# Patient Record
Sex: FEMALE | Race: WHITE | Employment: OTHER | URBAN - METROPOLITAN AREA
[De-identification: names, ages, dates, MRNs, and addresses within clinical notes are randomized per-mention and may not be internally consistent; named-entity substitution may affect disease eponyms.]

---

## 2023-08-06 ENCOUNTER — APPOINTMENT (OUTPATIENT)
Dept: CT IMAGING | Age: 78
End: 2023-08-06
Payer: MEDICARE

## 2023-08-06 ENCOUNTER — HOSPITAL ENCOUNTER (INPATIENT)
Age: 78
LOS: 5 days | Discharge: NURSING, REHAB OR LONG-TERM CARE | End: 2023-08-11
Attending: EMERGENCY MEDICINE | Admitting: FAMILY MEDICINE
Payer: MEDICARE

## 2023-08-06 ENCOUNTER — APPOINTMENT (OUTPATIENT)
Dept: GENERAL RADIOLOGY | Age: 78
End: 2023-08-06
Payer: MEDICARE

## 2023-08-06 DIAGNOSIS — E11.65 HYPERGLYCEMIA DUE TO DIABETES MELLITUS (HCC): Primary | ICD-10-CM

## 2023-08-06 DIAGNOSIS — N18.4 STAGE 4 CHRONIC KIDNEY DISEASE (HCC): ICD-10-CM

## 2023-08-06 DIAGNOSIS — E87.5 HYPERKALEMIA: ICD-10-CM

## 2023-08-06 PROBLEM — N17.0 ACUTE RENAL FAILURE WITH TUBULAR NECROSIS (HCC): Status: ACTIVE | Noted: 2023-08-06

## 2023-08-06 PROBLEM — N17.9 AKI (ACUTE KIDNEY INJURY) (HCC): Status: ACTIVE | Noted: 2023-08-06

## 2023-08-06 LAB
ALBUMIN SERPL-MCNC: 3.8 G/DL (ref 3.5–5.2)
ALBUMIN/GLOB SERPL: 1.3 {RATIO} (ref 1–2.5)
ALP SERPL-CCNC: 124 U/L (ref 35–104)
ALT SERPL-CCNC: 13 U/L (ref 5–33)
ANION GAP SERPL CALCULATED.3IONS-SCNC: 12 MMOL/L (ref 9–17)
ANION GAP SERPL CALCULATED.3IONS-SCNC: 15 MMOL/L (ref 9–17)
AST SERPL-CCNC: 18 U/L
B-OH-BUTYR SERPL-MCNC: 0.18 MMOL/L (ref 0.02–0.27)
BACTERIA URNS QL MICRO: ABNORMAL
BASOPHILS # BLD: 0.1 K/UL (ref 0–0.2)
BASOPHILS NFR BLD: 1 % (ref 0–2)
BILIRUB SERPL-MCNC: 0.5 MG/DL (ref 0.3–1.2)
BILIRUB UR QL STRIP: NEGATIVE
BUN SERPL-MCNC: 50 MG/DL (ref 8–23)
CALCIUM SERPL-MCNC: 9 MG/DL (ref 8.6–10.4)
CASTS #/AREA URNS LPF: ABNORMAL /LPF
CASTS #/AREA URNS LPF: ABNORMAL /LPF
CHLORIDE SERPL-SCNC: 100 MMOL/L (ref 98–107)
CHLORIDE SERPL-SCNC: 91 MMOL/L (ref 98–107)
CLARITY UR: CLEAR
CO2 SERPL-SCNC: 17 MMOL/L (ref 20–31)
CO2 SERPL-SCNC: 17 MMOL/L (ref 20–31)
COLOR UR: YELLOW
CREAT SERPL-MCNC: 2.6 MG/DL (ref 0.5–0.9)
EOSINOPHIL # BLD: 0.3 K/UL (ref 0–0.4)
EOSINOPHILS RELATIVE PERCENT: 3 % (ref 1–4)
EPI CELLS #/AREA URNS HPF: ABNORMAL /HPF (ref 0–5)
ERYTHROCYTE [DISTWIDTH] IN BLOOD BY AUTOMATED COUNT: 13.2 % (ref 12.5–15.4)
GFR SERPL CREATININE-BSD FRML MDRD: 18 ML/MIN/1.73M2
GLUCOSE SERPL-MCNC: 461 MG/DL (ref 70–99)
GLUCOSE UR STRIP-MCNC: ABNORMAL MG/DL
HCT VFR BLD AUTO: 37.7 % (ref 36–46)
HGB BLD-MCNC: 12.9 G/DL (ref 12–16)
HGB UR QL STRIP.AUTO: NEGATIVE
KETONES UR STRIP-MCNC: NEGATIVE MG/DL
LEUKOCYTE ESTERASE UR QL STRIP: NEGATIVE
LYMPHOCYTES NFR BLD: 3.5 K/UL (ref 1–4.8)
LYMPHOCYTES RELATIVE PERCENT: 33 % (ref 24–44)
MCH RBC QN AUTO: 32.1 PG (ref 26–34)
MCHC RBC AUTO-ENTMCNC: 34.1 G/DL (ref 31–37)
MCV RBC AUTO: 94.2 FL (ref 80–100)
METER GLUCOSE: 123 MG/DL (ref 65–105)
METER GLUCOSE: 248 MG/DL (ref 65–105)
MONOCYTES NFR BLD: 0.6 K/UL (ref 0.1–1.2)
MONOCYTES NFR BLD: 5 % (ref 2–11)
MUCOUS THREADS URNS QL MICRO: ABNORMAL
NEUTROPHILS NFR BLD: 58 % (ref 36–66)
NEUTS SEG NFR BLD: 6.1 K/UL (ref 1.8–7.7)
NITRITE UR QL STRIP: NEGATIVE
PH UR STRIP: 6 [PH] (ref 5–8)
PLATELET # BLD AUTO: 393 K/UL (ref 140–450)
PMV BLD AUTO: 8.6 FL (ref 6–12)
POTASSIUM SERPL-SCNC: 5.3 MMOL/L (ref 3.7–5.3)
POTASSIUM SERPL-SCNC: 6.3 MMOL/L (ref 3.7–5.3)
PROT SERPL-MCNC: 6.8 G/DL (ref 6.4–8.3)
PROT UR STRIP-MCNC: ABNORMAL MG/DL
RBC # BLD AUTO: 4 M/UL (ref 4–5.2)
RBC #/AREA URNS HPF: ABNORMAL /HPF (ref 0–2)
SODIUM SERPL-SCNC: 123 MMOL/L (ref 135–144)
SODIUM SERPL-SCNC: 129 MMOL/L (ref 135–144)
SODIUM UR-SCNC: 39 MMOL/L
SP GR UR STRIP: 1.01 (ref 1–1.03)
TROPONIN I SERPL HS-MCNC: 22 NG/L (ref 0–14)
TSH SERPL DL<=0.05 MIU/L-ACNC: 1.92 UIU/ML (ref 0.3–5)
UROBILINOGEN UR STRIP-ACNC: NORMAL EU/DL (ref 0–1)
WBC #/AREA URNS HPF: ABNORMAL /HPF (ref 0–5)
WBC OTHER # BLD: 10.5 K/UL (ref 3.5–11)

## 2023-08-06 PROCEDURE — 2500000003 HC RX 250 WO HCPCS: Performed by: INTERNAL MEDICINE

## 2023-08-06 PROCEDURE — 99222 1ST HOSP IP/OBS MODERATE 55: CPT | Performed by: FAMILY MEDICINE

## 2023-08-06 PROCEDURE — 82570 ASSAY OF URINE CREATININE: CPT

## 2023-08-06 PROCEDURE — 2580000003 HC RX 258: Performed by: INTERNAL MEDICINE

## 2023-08-06 PROCEDURE — 2580000003 HC RX 258: Performed by: FAMILY MEDICINE

## 2023-08-06 PROCEDURE — 99223 1ST HOSP IP/OBS HIGH 75: CPT | Performed by: INTERNAL MEDICINE

## 2023-08-06 PROCEDURE — 84443 ASSAY THYROID STIM HORMONE: CPT

## 2023-08-06 PROCEDURE — 99285 EMERGENCY DEPT VISIT HI MDM: CPT

## 2023-08-06 PROCEDURE — 6370000000 HC RX 637 (ALT 250 FOR IP): Performed by: NURSE PRACTITIONER

## 2023-08-06 PROCEDURE — 6360000002 HC RX W HCPCS: Performed by: NURSE PRACTITIONER

## 2023-08-06 PROCEDURE — 2580000003 HC RX 258: Performed by: NURSE PRACTITIONER

## 2023-08-06 PROCEDURE — 87086 URINE CULTURE/COLONY COUNT: CPT

## 2023-08-06 PROCEDURE — 85025 COMPLETE CBC W/AUTO DIFF WBC: CPT

## 2023-08-06 PROCEDURE — 36415 COLL VENOUS BLD VENIPUNCTURE: CPT

## 2023-08-06 PROCEDURE — 6370000000 HC RX 637 (ALT 250 FOR IP): Performed by: FAMILY MEDICINE

## 2023-08-06 PROCEDURE — 80053 COMPREHEN METABOLIC PANEL: CPT

## 2023-08-06 PROCEDURE — 84484 ASSAY OF TROPONIN QUANT: CPT

## 2023-08-06 PROCEDURE — 70450 CT HEAD/BRAIN W/O DYE: CPT

## 2023-08-06 PROCEDURE — 84156 ASSAY OF PROTEIN URINE: CPT

## 2023-08-06 PROCEDURE — 96375 TX/PRO/DX INJ NEW DRUG ADDON: CPT

## 2023-08-06 PROCEDURE — 96374 THER/PROPH/DIAG INJ IV PUSH: CPT

## 2023-08-06 PROCEDURE — 6360000002 HC RX W HCPCS: Performed by: FAMILY MEDICINE

## 2023-08-06 PROCEDURE — 82010 KETONE BODYS QUAN: CPT

## 2023-08-06 PROCEDURE — 81001 URINALYSIS AUTO W/SCOPE: CPT

## 2023-08-06 PROCEDURE — 71045 X-RAY EXAM CHEST 1 VIEW: CPT

## 2023-08-06 PROCEDURE — 84300 ASSAY OF URINE SODIUM: CPT

## 2023-08-06 PROCEDURE — 80051 ELECTROLYTE PANEL: CPT

## 2023-08-06 PROCEDURE — 93005 ELECTROCARDIOGRAM TRACING: CPT | Performed by: NURSE PRACTITIONER

## 2023-08-06 PROCEDURE — 2060000000 HC ICU INTERMEDIATE R&B

## 2023-08-06 PROCEDURE — 82947 ASSAY GLUCOSE BLOOD QUANT: CPT

## 2023-08-06 RX ORDER — SODIUM CHLORIDE 9 MG/ML
INJECTION, SOLUTION INTRAVENOUS PRN
Status: DISCONTINUED | OUTPATIENT
Start: 2023-08-06 | End: 2023-08-11 | Stop reason: HOSPADM

## 2023-08-06 RX ORDER — BISACODYL 10 MG
10 SUPPOSITORY, RECTAL RECTAL DAILY PRN
Status: DISCONTINUED | OUTPATIENT
Start: 2023-08-06 | End: 2023-08-11 | Stop reason: HOSPADM

## 2023-08-06 RX ORDER — INSULIN LISPRO 100 [IU]/ML
0-4 INJECTION, SOLUTION INTRAVENOUS; SUBCUTANEOUS NIGHTLY
Status: DISCONTINUED | OUTPATIENT
Start: 2023-08-06 | End: 2023-08-11 | Stop reason: HOSPADM

## 2023-08-06 RX ORDER — GLUCAGON 1 MG/ML
1 KIT INJECTION PRN
Status: DISCONTINUED | OUTPATIENT
Start: 2023-08-06 | End: 2023-08-11 | Stop reason: HOSPADM

## 2023-08-06 RX ORDER — 0.9 % SODIUM CHLORIDE 0.9 %
1000 INTRAVENOUS SOLUTION INTRAVENOUS ONCE
Status: COMPLETED | OUTPATIENT
Start: 2023-08-06 | End: 2023-08-06

## 2023-08-06 RX ORDER — AMLODIPINE BESYLATE 5 MG/1
5 TABLET ORAL DAILY
Status: DISCONTINUED | OUTPATIENT
Start: 2023-08-07 | End: 2023-08-11 | Stop reason: HOSPADM

## 2023-08-06 RX ORDER — ONDANSETRON 4 MG/1
4 TABLET, ORALLY DISINTEGRATING ORAL EVERY 8 HOURS PRN
Status: DISCONTINUED | OUTPATIENT
Start: 2023-08-06 | End: 2023-08-11 | Stop reason: HOSPADM

## 2023-08-06 RX ORDER — ONDANSETRON 2 MG/ML
4 INJECTION INTRAMUSCULAR; INTRAVENOUS EVERY 6 HOURS PRN
Status: DISCONTINUED | OUTPATIENT
Start: 2023-08-06 | End: 2023-08-11 | Stop reason: HOSPADM

## 2023-08-06 RX ORDER — HEPARIN SODIUM 5000 [USP'U]/ML
5000 INJECTION, SOLUTION INTRAVENOUS; SUBCUTANEOUS EVERY 8 HOURS SCHEDULED
Status: DISCONTINUED | OUTPATIENT
Start: 2023-08-06 | End: 2023-08-11 | Stop reason: HOSPADM

## 2023-08-06 RX ORDER — CEPHALEXIN 500 MG/1
1 CAPSULE ORAL
Status: ON HOLD | COMMUNITY
Start: 2023-08-01 | End: 2023-08-09 | Stop reason: HOSPADM

## 2023-08-06 RX ORDER — ACETAMINOPHEN 325 MG/1
650 TABLET ORAL EVERY 6 HOURS PRN
Status: DISCONTINUED | OUTPATIENT
Start: 2023-08-06 | End: 2023-08-11 | Stop reason: HOSPADM

## 2023-08-06 RX ORDER — SODIUM CHLORIDE 0.9 % (FLUSH) 0.9 %
5-40 SYRINGE (ML) INJECTION EVERY 12 HOURS SCHEDULED
Status: DISCONTINUED | OUTPATIENT
Start: 2023-08-06 | End: 2023-08-11 | Stop reason: HOSPADM

## 2023-08-06 RX ORDER — SODIUM CHLORIDE 9 MG/ML
INJECTION, SOLUTION INTRAVENOUS CONTINUOUS
Status: DISCONTINUED | OUTPATIENT
Start: 2023-08-06 | End: 2023-08-06

## 2023-08-06 RX ORDER — AMLODIPINE BESYLATE 5 MG/1
TABLET ORAL
Status: ON HOLD | COMMUNITY
Start: 2023-05-16 | End: 2023-08-08 | Stop reason: HOSPADM

## 2023-08-06 RX ORDER — CEPHALEXIN 250 MG/1
500 CAPSULE ORAL EVERY 12 HOURS SCHEDULED
Status: COMPLETED | OUTPATIENT
Start: 2023-08-06 | End: 2023-08-11

## 2023-08-06 RX ORDER — DEXTROSE MONOHYDRATE 100 MG/ML
INJECTION, SOLUTION INTRAVENOUS CONTINUOUS PRN
Status: DISCONTINUED | OUTPATIENT
Start: 2023-08-06 | End: 2023-08-11 | Stop reason: HOSPADM

## 2023-08-06 RX ORDER — INSULIN LISPRO 100 [IU]/ML
0-4 INJECTION, SOLUTION INTRAVENOUS; SUBCUTANEOUS
Status: DISCONTINUED | OUTPATIENT
Start: 2023-08-06 | End: 2023-08-11 | Stop reason: HOSPADM

## 2023-08-06 RX ORDER — INSULIN GLARGINE 100 [IU]/ML
35 INJECTION, SOLUTION SUBCUTANEOUS DAILY
Status: DISCONTINUED | OUTPATIENT
Start: 2023-08-06 | End: 2023-08-11 | Stop reason: HOSPADM

## 2023-08-06 RX ORDER — CALCIUM GLUCONATE 94 MG/ML
1000 INJECTION, SOLUTION INTRAVENOUS ONCE
Status: COMPLETED | OUTPATIENT
Start: 2023-08-06 | End: 2023-08-06

## 2023-08-06 RX ORDER — ACETAMINOPHEN 650 MG/1
650 SUPPOSITORY RECTAL EVERY 6 HOURS PRN
Status: DISCONTINUED | OUTPATIENT
Start: 2023-08-06 | End: 2023-08-11 | Stop reason: HOSPADM

## 2023-08-06 RX ORDER — SODIUM CHLORIDE 0.9 % (FLUSH) 0.9 %
5-40 SYRINGE (ML) INJECTION PRN
Status: DISCONTINUED | OUTPATIENT
Start: 2023-08-06 | End: 2023-08-11 | Stop reason: HOSPADM

## 2023-08-06 RX ADMIN — CALCIUM GLUCONATE 1000 MG: 98 INJECTION, SOLUTION INTRAVENOUS at 15:00

## 2023-08-06 RX ADMIN — HEPARIN SODIUM 5000 UNITS: 5000 INJECTION INTRAVENOUS; SUBCUTANEOUS at 23:20

## 2023-08-06 RX ADMIN — CEPHALEXIN 500 MG: 250 CAPSULE ORAL at 23:11

## 2023-08-06 RX ADMIN — SODIUM CHLORIDE 1000 ML: 9 INJECTION, SOLUTION INTRAVENOUS at 15:02

## 2023-08-06 RX ADMIN — ACETAMINOPHEN 650 MG: 325 TABLET ORAL at 23:11

## 2023-08-06 RX ADMIN — SODIUM CHLORIDE: 9 INJECTION, SOLUTION INTRAVENOUS at 19:05

## 2023-08-06 RX ADMIN — INSULIN HUMAN 10 UNITS: 100 INJECTION, SOLUTION PARENTERAL at 14:58

## 2023-08-06 RX ADMIN — SODIUM CHLORIDE, PRESERVATIVE FREE 10 ML: 5 INJECTION INTRAVENOUS at 22:21

## 2023-08-06 RX ADMIN — SODIUM BICARBONATE: 84 INJECTION, SOLUTION INTRAVENOUS at 22:19

## 2023-08-06 ASSESSMENT — PAIN SCALES - GENERAL
PAINLEVEL_OUTOF10: 0
PAINLEVEL_OUTOF10: 9

## 2023-08-06 ASSESSMENT — PAIN - FUNCTIONAL ASSESSMENT: PAIN_FUNCTIONAL_ASSESSMENT: 0-10

## 2023-08-06 ASSESSMENT — PAIN DESCRIPTION - DESCRIPTORS: DESCRIPTORS: ACHING

## 2023-08-06 ASSESSMENT — PAIN DESCRIPTION - LOCATION: LOCATION: ABDOMEN

## 2023-08-06 ASSESSMENT — ENCOUNTER SYMPTOMS
DIARRHEA: 0
VOMITING: 0
SHORTNESS OF BREATH: 0
NAUSEA: 0
ABDOMINAL PAIN: 0

## 2023-08-06 ASSESSMENT — PAIN DESCRIPTION - ORIENTATION: ORIENTATION: LOWER

## 2023-08-06 NOTE — H&P
Monocytes % 5 2 - 11 %    Eosinophils % 3 1 - 4 %    Basophils % 1 0 - 2 %    Neutrophils Absolute 6.10 1.8 - 7.7 k/uL    Lymphocytes Absolute 3.50 1.0 - 4.8 k/uL    Monocytes Absolute 0.60 0.1 - 1.2 k/uL    Eosinophils Absolute 0.30 0.0 - 0.4 k/uL    Basophils Absolute 0.10 0.0 - 0.2 k/uL   Comprehensive Metabolic Panel    Collection Time: 08/06/23  1:45 PM   Result Value Ref Range    Glucose 461 (HH) 70 - 99 mg/dL    BUN 50 (H) 8 - 23 mg/dL    Creatinine 2.6 (H) 0.5 - 0.9 mg/dL    Est, Glom Filt Rate 18 (L) >60 mL/min/1.73m2    Calcium 9.0 8.6 - 10.4 mg/dL    Sodium 123 (L) 135 - 144 mmol/L    Potassium 6.3 (HH) 3.7 - 5.3 mmol/L    Chloride 91 (L) 98 - 107 mmol/L    CO2 17 (L) 20 - 31 mmol/L    Anion Gap 15 9 - 17 mmol/L    Alkaline Phosphatase 124 (H) 35 - 104 U/L    ALT 13 5 - 33 U/L    AST 18 <32 U/L    Total Bilirubin 0.5 0.3 - 1.2 mg/dL    Total Protein 6.8 6.4 - 8.3 g/dL    Albumin 3.8 3.5 - 5.2 g/dL    Albumin/Globulin Ratio 1.3 1.0 - 2.5   Troponin    Collection Time: 08/06/23  1:45 PM   Result Value Ref Range    Troponin, High Sensitivity 22 (H) 0 - 14 ng/L   TSH with Reflex    Collection Time: 08/06/23  1:45 PM   Result Value Ref Range    TSH 1.92 0.30 - 5.00 uIU/mL   Beta-Hydroxybutyrate    Collection Time: 08/06/23  1:45 PM   Result Value Ref Range    Beta-Hydroxybutyrate 0.18 0.02 - 0.27 mmol/L   EKG 12 Lead    Collection Time: 08/06/23  2:31 PM   Result Value Ref Range    Ventricular Rate 87 BPM    Atrial Rate 87 BPM    P-R Interval 154 ms    QRS Duration 82 ms    Q-T Interval 364 ms    QTc Calculation (Bazett) 438 ms    P Axis 22 degrees    R Axis 27 degrees    T Axis 63 degrees       Imaging/Diagnostics:  CT HEAD WO CONTRAST    Result Date: 8/6/2023  Mild central and cortical cerebral atrophy. Encephalomalacia in the left occipital parietal lobe. Bilateral lacunar infarcts in the basal ganglia most likely chronic.  Mild to moderate chronic deep white matter ischemic changes No acute intracranial abnormalities are noted. Focal hyperostosis along the outer table of the right parietal bone. Please correlate clinically     XR CHEST PORTABLE    Result Date: 8/6/2023  No acute cardiopulmonary disease       Assessment :      Hospital Problems             Last Modified POA    * (Principal) SHANITA (acute kidney injury) ([address]) 8/6/2023 Yes       Plan:     Acute kidney injury-nephrology consulted, continue IV fluids, will hold lisinopril and metformin at this point. In reviewing chart records patient was resisting to get labs done more recently for whatever reason, there seems to be a disconnect in their understanding of her underlying health And medical problems; creatinine ranged 1.4-1.6 2019 through 2021. Microalbumin to urine ratio in 2021 was 2300 of note. 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 Hyperkalemia-secondary to acute kidney injury, hold metformin and lisinopril, IV fluids, nephrology consulted, hyperkalemia treated in ER. 4. Hypertension-continue amlodipine, hold lisinopril due to SHANITA, titrate Norvasc or add other blood pressure agents if needed  5. History of CVA with left hemiparesis  6. Recent UTI-check UA C and S, continue cephalexin for now  7. Recent fall and head trauma-CT brain this admission no acute finding    Patient is admitted as inpatient status because of co-morbidities listed above, severity of signs and symptoms as outlined, requirement for current medical therapies and most importantly because of direct risk to patient if care not provided in a hospital setting. Expected length of stay > 48 hours.  Patient is admitted in the Medical ICU    Medical Decision Making: High Sana Norwood MD  8/6/2023  4:47 PM

## 2023-08-06 NOTE — ED PROVIDER NOTES
[facility]  Emergency Department Encounter  Mid Level Provider     Pt Name: Jong Hooper  MRN: [id number]  [address] [facility] 1945  Date of evaluation: 8/6/23  PCP:  Laurence Pinon MD    CHIEF COMPLAINT       Chief Complaint   Patient presents with    Altered Mental Status     A&OX2, being treated for uti, hx of strokes in past, son told ems that she is not acting herself,  per ems       HISTORY OF PRESENT ILLNESS  (Location/Symptom, Timing/Onset,Context/Setting, Quality, Duration, Modifying Factors, Severity.)      Jong Hooper is a 78 y.o. female who presents with no concerns for me. Overall she is a poor informant. Will wait for her son to arrive to receive history. Patient denies chest pain or shortness of breath. She denies fever or chills. She denies nausea vomiting diarrhea. Denies urinary symptoms. She states she has no complaints at all. She reports she lives with her son who cares for her. Patient reports she can walk but prefers not to. PAST MEDICAL /SURGICAL / SOCIAL / FAMILY HISTORY      has a past medical history of Anxiety, Cervicalgia, Diabetes mellitus ([address]), Dorsalgia, Hemiplegia ([address]), Hypertension, Osteoporosis, and Stroke (cerebrum) ([address]). has a past surgical history that includes back surgery and knee surgery.     Social History     Socioeconomic History    Marital status:      Spouse name: Not on file    Number of children: Not on file    Years of education: Not on file    Highest education level: Not on file   Occupational History    Not on file   Tobacco Use    Smoking status: Never    Smokeless tobacco: Never   Substance and Sexual Activity    Alcohol use: Never    Drug use: Not on file    Sexual activity: Not on file   Other Topics Concern    Not on file   Social History Narrative    Not on file     Social Determinants of Health     Financial Resource Strain: Not on file   Food Insecurity: Not on file   Transportation Needs:

## 2023-08-07 ENCOUNTER — APPOINTMENT (OUTPATIENT)
Dept: ULTRASOUND IMAGING | Age: 78
End: 2023-08-07
Payer: MEDICARE

## 2023-08-07 PROBLEM — I10 ESSENTIAL HYPERTENSION: Status: ACTIVE | Noted: 2023-08-07

## 2023-08-07 PROBLEM — N18.9 ACUTE KIDNEY INJURY SUPERIMPOSED ON CKD (HCC): Status: ACTIVE | Noted: 2023-08-06

## 2023-08-07 PROBLEM — N17.9 ACUTE KIDNEY INJURY SUPERIMPOSED ON CKD (HCC): Status: ACTIVE | Noted: 2023-08-06

## 2023-08-07 LAB
ALBUMIN SERPL-MCNC: 3.2 G/DL (ref 3.5–5.2)
ALBUMIN/GLOB SERPL: 1.3 {RATIO} (ref 1–2.5)
ALP SERPL-CCNC: 97 U/L (ref 35–104)
ALT SERPL-CCNC: 11 U/L (ref 5–33)
ANION GAP SERPL CALCULATED.3IONS-SCNC: 6 MMOL/L (ref 9–17)
ANION GAP SERPL CALCULATED.3IONS-SCNC: 7 MMOL/L (ref 9–17)
ANION GAP SERPL CALCULATED.3IONS-SCNC: 8 MMOL/L (ref 9–17)
ANION GAP SERPL CALCULATED.3IONS-SCNC: 9 MMOL/L (ref 9–17)
AST SERPL-CCNC: 14 U/L
BASOPHILS # BLD: 0.1 K/UL (ref 0–0.2)
BASOPHILS NFR BLD: 1 % (ref 0–2)
BILIRUB SERPL-MCNC: 0.5 MG/DL (ref 0.3–1.2)
BUN SERPL-MCNC: 42 MG/DL (ref 8–23)
CALCIUM SERPL-MCNC: 8.8 MG/DL (ref 8.6–10.4)
CHLORIDE SERPL-SCNC: 102 MMOL/L (ref 98–107)
CHLORIDE SERPL-SCNC: 96 MMOL/L (ref 98–107)
CHLORIDE SERPL-SCNC: 97 MMOL/L (ref 98–107)
CHLORIDE SERPL-SCNC: 99 MMOL/L (ref 98–107)
CO2 SERPL-SCNC: 24 MMOL/L (ref 20–31)
CO2 SERPL-SCNC: 28 MMOL/L (ref 20–31)
CO2 SERPL-SCNC: 28 MMOL/L (ref 20–31)
CO2 SERPL-SCNC: 29 MMOL/L (ref 20–31)
CREAT SERPL-MCNC: 2.1 MG/DL (ref 0.5–0.9)
CREAT UR-MCNC: 66 MG/DL (ref 28–217)
EKG ATRIAL RATE: 87 BPM
EKG P AXIS: 22 DEGREES
EKG P-R INTERVAL: 154 MS
EKG Q-T INTERVAL: 364 MS
EKG QRS DURATION: 82 MS
EKG QTC CALCULATION (BAZETT): 438 MS
EKG R AXIS: 27 DEGREES
EKG T AXIS: 63 DEGREES
EKG VENTRICULAR RATE: 87 BPM
EOSINOPHIL # BLD: 0.6 K/UL (ref 0–0.4)
EOSINOPHILS RELATIVE PERCENT: 5 % (ref 1–4)
ERYTHROCYTE [DISTWIDTH] IN BLOOD BY AUTOMATED COUNT: 13.2 % (ref 12.5–15.4)
GFR SERPL CREATININE-BSD FRML MDRD: 23 ML/MIN/1.73M2
GLUCOSE SERPL-MCNC: 188 MG/DL (ref 70–99)
HCT VFR BLD AUTO: 34.9 % (ref 36–46)
HGB BLD-MCNC: 11.9 G/DL (ref 12–16)
LYMPHOCYTES NFR BLD: 4.2 K/UL (ref 1–4.8)
LYMPHOCYTES RELATIVE PERCENT: 36 % (ref 24–44)
MCH RBC QN AUTO: 31.6 PG (ref 26–34)
MCHC RBC AUTO-ENTMCNC: 34 G/DL (ref 31–37)
MCV RBC AUTO: 92.9 FL (ref 80–100)
METER GLUCOSE: 122 MG/DL (ref 65–105)
METER GLUCOSE: 174 MG/DL (ref 65–105)
METER GLUCOSE: 205 MG/DL (ref 65–105)
METER GLUCOSE: 257 MG/DL (ref 65–105)
MONOCYTES NFR BLD: 0.7 K/UL (ref 0.1–1.2)
MONOCYTES NFR BLD: 6 % (ref 2–11)
NEUTROPHILS NFR BLD: 52 % (ref 36–66)
NEUTS SEG NFR BLD: 6.1 K/UL (ref 1.8–7.7)
PLATELET # BLD AUTO: 381 K/UL (ref 140–450)
PMV BLD AUTO: 8.3 FL (ref 6–12)
POTASSIUM SERPL-SCNC: 4.6 MMOL/L (ref 3.7–5.3)
POTASSIUM SERPL-SCNC: 4.7 MMOL/L (ref 3.7–5.3)
PROT SERPL-MCNC: 5.7 G/DL (ref 6.4–8.3)
RBC # BLD AUTO: 3.75 M/UL (ref 4–5.2)
REASON FOR REJECTION: NORMAL
SODIUM SERPL-SCNC: 132 MMOL/L (ref 135–144)
SODIUM SERPL-SCNC: 132 MMOL/L (ref 135–144)
SODIUM SERPL-SCNC: 133 MMOL/L (ref 135–144)
SODIUM SERPL-SCNC: 136 MMOL/L (ref 135–144)
SPECIMEN SOURCE: NORMAL
TOTAL PROTEIN, URINE: 82 MG/DL
WBC OTHER # BLD: 11.6 K/UL (ref 3.5–11)
ZZ NTE CLEAN UP: ORDERED TEST: NORMAL

## 2023-08-07 PROCEDURE — 6370000000 HC RX 637 (ALT 250 FOR IP): Performed by: FAMILY MEDICINE

## 2023-08-07 PROCEDURE — 36415 COLL VENOUS BLD VENIPUNCTURE: CPT

## 2023-08-07 PROCEDURE — 2060000000 HC ICU INTERMEDIATE R&B

## 2023-08-07 PROCEDURE — 99221 1ST HOSP IP/OBS SF/LOW 40: CPT | Performed by: FAMILY MEDICINE

## 2023-08-07 PROCEDURE — 97535 SELF CARE MNGMENT TRAINING: CPT

## 2023-08-07 PROCEDURE — 76770 US EXAM ABDO BACK WALL COMP: CPT

## 2023-08-07 PROCEDURE — 6360000002 HC RX W HCPCS: Performed by: FAMILY MEDICINE

## 2023-08-07 PROCEDURE — 97162 PT EVAL MOD COMPLEX 30 MIN: CPT

## 2023-08-07 PROCEDURE — 97116 GAIT TRAINING THERAPY: CPT

## 2023-08-07 PROCEDURE — 6370000000 HC RX 637 (ALT 250 FOR IP): Performed by: INTERNAL MEDICINE

## 2023-08-07 PROCEDURE — 82947 ASSAY GLUCOSE BLOOD QUANT: CPT

## 2023-08-07 PROCEDURE — 80051 ELECTROLYTE PANEL: CPT

## 2023-08-07 PROCEDURE — 2580000003 HC RX 258: Performed by: FAMILY MEDICINE

## 2023-08-07 PROCEDURE — 97166 OT EVAL MOD COMPLEX 45 MIN: CPT

## 2023-08-07 PROCEDURE — 97530 THERAPEUTIC ACTIVITIES: CPT

## 2023-08-07 PROCEDURE — 85025 COMPLETE CBC W/AUTO DIFF WBC: CPT

## 2023-08-07 PROCEDURE — 99232 SBSQ HOSP IP/OBS MODERATE 35: CPT | Performed by: INTERNAL MEDICINE

## 2023-08-07 PROCEDURE — 2580000003 HC RX 258: Performed by: INTERNAL MEDICINE

## 2023-08-07 PROCEDURE — 80053 COMPREHEN METABOLIC PANEL: CPT

## 2023-08-07 RX ORDER — TRAMADOL HYDROCHLORIDE 50 MG/1
50 TABLET ORAL EVERY 6 HOURS PRN
Status: DISCONTINUED | OUTPATIENT
Start: 2023-08-07 | End: 2023-08-11 | Stop reason: HOSPADM

## 2023-08-07 RX ORDER — SODIUM CHLORIDE 9 MG/ML
INJECTION, SOLUTION INTRAVENOUS CONTINUOUS
Status: DISCONTINUED | OUTPATIENT
Start: 2023-08-07 | End: 2023-08-09

## 2023-08-07 RX ADMIN — HEPARIN SODIUM 5000 UNITS: 5000 INJECTION INTRAVENOUS; SUBCUTANEOUS at 22:03

## 2023-08-07 RX ADMIN — SODIUM CHLORIDE: 9 INJECTION, SOLUTION INTRAVENOUS at 11:48

## 2023-08-07 RX ADMIN — HEPARIN SODIUM 5000 UNITS: 5000 INJECTION INTRAVENOUS; SUBCUTANEOUS at 14:27

## 2023-08-07 RX ADMIN — SODIUM ZIRCONIUM CYCLOSILICATE 5 G: 5 POWDER, FOR SUSPENSION ORAL at 10:59

## 2023-08-07 RX ADMIN — SODIUM ZIRCONIUM CYCLOSILICATE 5 G: 5 POWDER, FOR SUSPENSION ORAL at 14:27

## 2023-08-07 RX ADMIN — TRAMADOL HYDROCHLORIDE 50 MG: 50 TABLET, COATED ORAL at 18:52

## 2023-08-07 RX ADMIN — CEPHALEXIN 500 MG: 250 CAPSULE ORAL at 22:02

## 2023-08-07 RX ADMIN — INSULIN LISPRO 1 UNITS: 100 INJECTION, SOLUTION INTRAVENOUS; SUBCUTANEOUS at 12:50

## 2023-08-07 RX ADMIN — HEPARIN SODIUM 5000 UNITS: 5000 INJECTION INTRAVENOUS; SUBCUTANEOUS at 06:36

## 2023-08-07 RX ADMIN — SODIUM CHLORIDE, PRESERVATIVE FREE 10 ML: 5 INJECTION INTRAVENOUS at 09:09

## 2023-08-07 RX ADMIN — ACETAMINOPHEN 650 MG: 325 TABLET ORAL at 17:40

## 2023-08-07 RX ADMIN — INSULIN GLARGINE 35 UNITS: 100 INJECTION, SOLUTION SUBCUTANEOUS at 10:59

## 2023-08-07 RX ADMIN — SODIUM ZIRCONIUM CYCLOSILICATE 5 G: 5 POWDER, FOR SUSPENSION ORAL at 01:16

## 2023-08-07 RX ADMIN — AMLODIPINE BESYLATE 5 MG: 5 TABLET ORAL at 08:41

## 2023-08-07 RX ADMIN — INSULIN LISPRO 2 UNITS: 100 INJECTION, SOLUTION INTRAVENOUS; SUBCUTANEOUS at 17:40

## 2023-08-07 RX ADMIN — CEPHALEXIN 500 MG: 250 CAPSULE ORAL at 08:41

## 2023-08-07 ASSESSMENT — PAIN DESCRIPTION - LOCATION
LOCATION: BACK;LEG
LOCATION: BACK;LEG

## 2023-08-07 ASSESSMENT — PAIN SCALES - GENERAL
PAINLEVEL_OUTOF10: 0
PAINLEVEL_OUTOF10: 3
PAINLEVEL_OUTOF10: 0
PAINLEVEL_OUTOF10: 4

## 2023-08-07 ASSESSMENT — PAIN SCALES - WONG BAKER: WONGBAKER_NUMERICALRESPONSE: 0

## 2023-08-07 NOTE — PROGRESS NOTES
[address] [place] [address],[address] - Location: [place]    Progress Note    8/7/2023    9:18 AM    Name:   Rosevelt Phoenix  MRN:     [id number]     Acct:      [de-identified]   Room:   [address] [place] [address] Day:  1  Admit Date:  8/6/2023  1:42 PM    PCP:   Yolanda Carey MD  Code Status:  DNR-CCA    Subjective:     Patient complains of being tired this morning, she is knows she is at [facility] and she wants to go home. Denies cough or shortness of breath chest pain abdominal pain. PT was assessing patient when I arrived in the room and said she was a moderate assist to get her from the bed to the chair. Patient denies using walker at home, her son helps her move around and usually walks daily with him according what he told me. She does not know the date or the year. Last night was notified by nurse that upon arrival to the floor bladder scan indicated 1054 in bladder so Delaney was placed, clear yellow urine drained. Patient denies any urinary difficulties at home. Medications: Allergies:     Allergies   Allergen Reactions    Aricept [Donepezil Hcl] Diarrhea     Diarrhea    Namenda [Memantine Hcl] Other (See Comments)     Encephalopathy       Current Meds:   Scheduled Meds:    sodium chloride flush  5-40 mL IntraVENous 2 times per day    heparin (porcine)  5,000 Units SubCUTAneous 3 times per day    insulin lispro  0-4 Units SubCUTAneous TID WC    insulin lispro  0-4 Units SubCUTAneous Nightly    amLODIPine  5 mg Oral Daily    insulin glargine  35 Units SubCUTAneous Daily    cephALEXin  500 mg Oral 2 times per day    sodium zirconium cyclosilicate  5 g Oral TID     Continuous Infusions:    sodium chloride      dextrose      IV infusion builder 100 mL/hr at 08/06/23 2219     PRN Meds: sodium chloride flush, sodium chloride, ondansetron **OR** ondansetron, acetaminophen **OR** acetaminophen, bisacodyl, glucose, dextrose bolus **OR** dextrose bolus, glucagon (rDNA),

## 2023-08-07 NOTE — PROGRESS NOTES
Physical Therapy  Facility/Department: [facility] MED SURG ICU  Physical Therapy Initial Assessment    Name: Chad Mohs  : 1945  MRN: [id number]  Date of Service: 2023    Chief Complaint   Patient presents with    Altered Mental Status     A&OX2, being treated for uti, hx of strokes in past, son told ems that she is not acting herself,  per ems       Discharge Recommendations:  Patient would benefit from continued therapy after discharge   Further therapy recommended at discharge. The patient should be able to tolerate at least 3 hours of therapy per day over 5 days or 15 hours over 7 days. This patient may benefit from a Physical Medicine and Rehab consult. PT Equipment Recommendations  Other: Continue to assess- owns a quad cane. Will continue to assess once able to further attempt mobility      Patient Diagnosis(es): The primary encounter diagnosis was Hyperglycemia due to diabetes mellitus ([address]). Diagnoses of Hyperkalemia and Stage 4 chronic kidney disease ([address]) were also pertinent to this visit. Past Medical History:  has a past medical history of Anxiety, Cervicalgia, Diabetes mellitus ([address]), Dorsalgia, Hemiplegia ([address]), Hypertension, Osteoporosis, and Stroke (cerebrum) ([address]). Past Surgical History:  has a past surgical history that includes back surgery and knee surgery. Assessment   Body Structures, Functions, Activity Limitations Requiring Skilled Therapeutic Intervention: Decreased functional mobility ; Decreased strength;Decreased safe awareness;Decreased cognition;Decreased endurance;Decreased balance;Decreased posture  Assessment: Pt with impaired mobility requiring mod A for bed mobility and transfers today. Pt with decreased bilateral LE strength significantly limiting mobility overall. Pt would currently be unsafe to return to her prior living arrangement secondary to decreased balance and impaired LE strength making her at increased risk of falls.  Pt would benefit

## 2023-08-07 NOTE — PROGRESS NOTES
[address] [facility]  PROGRESS NOTE    Room # 740/938-13   Name: Surendra Sam            Episcopal:       Reason for visit: Routine    I visited the patient. Admit Date & Time: 8/6/2023  1:42 PM    Assessment:  Surendra Sam is a 78 y.o. female in the hospital because \"SHANITA\". Upon entering the room patient was sitting up in chair. Patient did not express any needs or concerns to this writer. Patient appeared to be calm and passive. Intervention:  I introduced myself and my title as  I offered space for patient  to express feelings, needs, and concerns and provided a ministry presence. Patient engaged in very brief conversation with patient, actively listening to patient. Outcome:  Patient expressed gratitude for this visit     Plan:  Chaplains will remain available to offer spiritual and emotional support as needed.     Electronically signed by Praveen Yun on 8/7/2023 at 12:13 PM.  [address] [facility] [address] -        08/07/23 1211   Encounter Summary   Service Provided For: Patient   Referral/Consult From: [address] [place] [address]   Last Encounter  08/07/23   Complexity of Encounter Low   Begin Time 1205   End Time  1210   Total Time Calculated 5 min   Encounter    Type Initial Screen/Assessment   Assessment/Intervention/Outcome   Assessment Passive;Calm   Intervention Sustaining Presence/Ministry of presence   Outcome Engaged in conversation;Expressed Gratitude

## 2023-08-07 NOTE — PROGRESS NOTES
Occupational Therapy  Facility/Department: [address] [facility] ICU  Occupational Therapy Initial Assessment    Name: Alma Iqbal  : 1945  MRN: [id number]  Date of Service: 2023    Chief Complaint   Patient presents with    Altered Mental Status     A&OX2, being treated for uti, hx of strokes in past, son told ems that she is not acting herself,  per ems     Discharge Recommendations:   Further therapy recommended at discharge. The patient should be able to tolerate at least 3 hours of therapy per day over 5 days or 15 hours over 7 days. This patient may benefit from a Physical Medicine and Rehab consult. OT Equipment Recommendations  Equipment Needed:  (AE/DME recommendations TBD)     Patient Diagnosis(es): The primary encounter diagnosis was Hyperglycemia due to diabetes mellitus ([address]). Diagnoses of Hyperkalemia and Stage 4 chronic kidney disease ([address]) were also pertinent to this visit. Past Medical History:  has a past medical history of Anxiety, Cervicalgia, Diabetes mellitus ([address]), Dorsalgia, Hemiplegia ([address]), Hypertension, Osteoporosis, and Stroke (cerebrum) ([address]). Past Surgical History:  has a past surgical history that includes back surgery and knee surgery. Assessment   Performance deficits / Impairments: Decreased functional mobility ; Decreased safe awareness;Decreased balance;Decreased ADL status; Decreased cognition;Decreased coordination;Decreased endurance;Decreased ROM; Decreased strength    Assessment: Pt seen for OT eval s/p SHANITA. Pt presents as pleaantly confused. Pt however denying majority of activity following PT eval. Pt MOD A for bed mobility, sat EOB for grooming at CGA for ~8 mins; MOD A for sit<>stand transfer with HHA once standing for ~2 mins with posterior knee support. RUE AROM ~120 degrees abduction; LUE AROM ~70 degrees abduction, L elbow AROM WFL, contractures L digits with hx of stroke affecting L side.  Hx taken from previous notes which reference pt's To: Patient  Education Provided: Role of Therapy;Transfer Training;Equipment;Plan of Care  Education Method: Verbal  Barriers to Learning: Cognition  Education Outcome: Verbalized understanding;Continued education needed     Hand Dominance  Hand Dominance: Right     AM-PAC Score     AM-PAC Inpatient Daily Activity Raw Score: 14 (08/07/23 1639)  AM-PAC Inpatient ADL T-Scale Score : 33.39 (08/07/23 1639)  ADL Inpatient CMS 0-100% Score: 59.67 (08/07/23 1639)  ADL Inpatient CMS G-Code Modifier : CK (08/07/23 1639)    Goals  Short Term Goals  Time Frame for Short Term Goals: 14 visits  Short Term Goal 1: Pt to complete UB dressing at setup to support return to PLOF  Short Term Goal 2: Pt to complete toileting at SBA to support return to PLOF  Short Term Goal 3: Pt to complete standing ADL task for >8 mins with no LOB and no rest breaks to support return to PLOF  Short Term Goal 4: Pt to be IND with BUE HEP to support improved functional strength needed for ADLs and ADL transfers     Therapy Time   Individual Concurrent Group Co-treatment   Time In 1519         Time Out 1540         Minutes 21         Timed Code Treatment Minutes: [address] [facility] [address] [place]

## 2023-08-07 NOTE — PROGRESS NOTES
oz (54.7 kg)     PHYSICAL EXAM      GENERAL APPEARANCE:Awake, alert, in no acute distress  SKIN: warm and dry, no rash or erythema  EYES: conjunctivae normal and sclera anicteric  ENT: no thrush no pharyngeal congestion   NECK:   No JVD. No carotid bruits and no carotid lymphadenopathy . PULMONARY: lungs are clear to auscultation. No Wheezing, no ronchi . CADRDIOVASCULAR: S1 and S2 normal NO S3 and NO S4 . No rubs , no murmur. ABDOMEN: soft nontender, bowel sounds present, no organomegaly, no ascites.      EXTREMITIES: no cyanosis, clubbing or edema     CURRENT MEDICATIONS      sodium chloride flush 0.9 % injection 5-40 mL, 2 times per day  sodium chloride flush 0.9 % injection 5-40 mL, PRN  0.9 % sodium chloride infusion, PRN  ondansetron (ZOFRAN-ODT) disintegrating tablet 4 mg, Q8H PRN   Or  ondansetron (ZOFRAN) injection 4 mg, Q6H PRN  acetaminophen (TYLENOL) tablet 650 mg, Q6H PRN   Or  acetaminophen (TYLENOL) suppository 650 mg, Q6H PRN  bisacodyl (DULCOLAX) suppository 10 mg, Daily PRN  heparin (porcine) injection 5,000 Units, 3 times per day  insulin lispro (HUMALOG) injection vial 0-4 Units, TID WC  insulin lispro (HUMALOG) injection vial 0-4 Units, Nightly  glucose chewable tablet 16 g, PRN  dextrose bolus 10% 125 mL, PRN   Or  dextrose bolus 10% 250 mL, PRN  glucagon injection 1 mg, PRN  dextrose 10 % infusion, Continuous PRN  amLODIPine (NORVASC) tablet 5 mg, Daily  insulin glargine (LANTUS) injection vial 35 Units, Daily  cephALEXin (KEFLEX) capsule 500 mg, 2 times per day  sodium zirconium cyclosilicate (LOKELMA) oral suspension 5 g, TID  sodium bicarbonate 150 mEq in sterile water infusion, Continuous          LABS      CBC:   Recent Labs     08/06/23  1345 08/07/23  0206   WBC 10.5 11.6*   RBC 4.00 3.75*   HGB 12.9 11.9*   HCT 37.7 34.9*   MCV 94.2 92.9   MCH 32.1 31.6   MCHC 34.1 34.0   RDW 13.2 13.2    381   MPV 8.6 8.3      BMP:   Recent Labs     08/06/23  1345 08/06/23  2100 08/07/23  0206 08/07/23  0928   * 129* 132* 133*   K 6.3* 5.3 4.6 4.7   CL 91* 100 99 97*   CO2 17* 17* 24 29   BUN 50*  --  42*  --    CREATININE 2.6*  --  2.1*  --    GLUCOSE 461*  --  188*  --    CALCIUM 9.0  --  8.8  --         ALBUMIN:   Recent Labs     08/06/23  1345 08/07/23  0206   LABALBU 3.8 3.2*       SPEP:   Lab Results   Component Value Date/Time    PROT 5.7 08/07/2023 02:06 AM     URINALYSIS:  U/A:   Lab Results   Component Value Date/Time    NITRU NEGATIVE 08/06/2023 05:06 PM    COLORU Yellow 08/06/2023 05:06 PM    PHUR 6.0 08/06/2023 05:06 PM    WBCUA 2 TO 5 08/06/2023 05:06 PM    RBCUA 2 TO 5 08/06/2023 05:06 PM    MUCUS 1+ 08/06/2023 05:06 PM    BACTERIA FEW 08/06/2023 05:06 PM    SPECGRAV 1.015 08/06/2023 05:06 PM    LEUKOCYTESUR NEGATIVE 08/06/2023 05:06 PM    UROBILINOGEN Normal 08/06/2023 05:06 PM    BILIRUBINUR NEGATIVE 08/06/2023 05:06 PM    GLUCOSEU 3+ 08/06/2023 05:06 PM    KETUA NEGATIVE 08/06/2023 05:06 PM         RADIOLOGY      Chest x-ray: Clear      ASSESSMENT      1. Acute Kidney Injury: Likely prerenal azotemia/ischemic ATN from intravascular volume depletion from osmotic diuresis, ACE inhibitor use. Baseline creatinine 1.8 approximately 4 years ago creatinine on presentation was 2.6 , creatinine down to 2.1 this morning   2. 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 Hyperkalemia likely from extracellular shift from hyperglycemia, metabolic acidosis, acute kidney injury, potassium supplements and lisinopril use  4. Hyperglycemic nonketotic state with blood sugar of more than 460   5. Type 2 diabetes with poor control  6. Hypertension  7. Anion gap metabolic acidosis secondary to acute kidney injury      PLAN      1. May discontinue bicarbonate infusion   2. Do not restart metformin upon discharge  3. Follow up labs ordered.    4.  Lipid on normal saline at 50 mL an hour which most likely can be

## 2023-08-07 NOTE — CARE COORDINATION
Identified Issues/Barriers to RETURNING to current housing: None  Potential Assistance needed at discharge: Home Care            Potential DME:    Patient expects to discharge to: [address] for transportation at discharge: Family    Financial    Payor: [id number] / Plan: [address] [place] [address] / Product Type: *No Product type* /     Does insurance require precert for SNF: Yes    Potential assistance Purchasing Medications:    Meds-to-Beds request:        [id number] - [address], [address] [facility] [phone] - [address] [facility] [address]  Phone: [phone] Fax: [phone]      Notes:    Factors facilitating achievement of predicted outcomes: Family support    Barriers to discharge: Additional Case Management Notes: Met with patient and son to discuss dc options. Pt lives with son in one level home. Son provides 24 hour care. She has a quad cane. Son is requesting a kvng wheelchair. Pt may also benefit from walker. Discussed short term SNF for rehab. Son prefers to take patient home. Discussed home therapy and skilled nursing. Offered choice of providers. He has no preference. The Plan for Transition of Care is related to the following treatment goals of Hyperkalemia [E87.5]  SHANITA (acute kidney injury) ([address]) [N17.9]  Stage 4 chronic kidney disease ([address]) [N18.4]  Hyperglycemia due to diabetes mellitus ([address]) [J53.24]    IF APPLICABLE: The Patient and/or patient representative Governor Medellin and her family were provided with a choice of provider and agrees with the discharge plan. Freedom of choice list with basic dialogue that supports the patient's individualized plan of care/goals and shares the quality data associated with the providers was provided to: Patient Representative   Patient Representative Name: Luiz Dyer     The Patient and/or Patient Representative Agree with the Discharge Plan?  Yes    JUAN Goetz  Case Management Department  Ph:  Fax:

## 2023-08-08 PROBLEM — E87.5 HYPERKALEMIA: Status: RESOLVED | Noted: 2023-08-06 | Resolved: 2023-08-08

## 2023-08-08 PROBLEM — N17.9 AKI (ACUTE KIDNEY INJURY) (HCC): Status: RESOLVED | Noted: 2023-08-06 | Resolved: 2023-08-08

## 2023-08-08 LAB
ALBUMIN SERPL-MCNC: 3 G/DL (ref 3.5–5.2)
ALBUMIN/GLOB SERPL: 1.3 {RATIO} (ref 1–2.5)
ALP SERPL-CCNC: 96 U/L (ref 35–104)
ALT SERPL-CCNC: 12 U/L (ref 5–33)
ANION GAP SERPL CALCULATED.3IONS-SCNC: 6 MMOL/L (ref 9–17)
ANION GAP SERPL CALCULATED.3IONS-SCNC: 7 MMOL/L (ref 9–17)
ANION GAP SERPL CALCULATED.3IONS-SCNC: 9 MMOL/L (ref 9–17)
ANION GAP SERPL CALCULATED.3IONS-SCNC: 9 MMOL/L (ref 9–17)
AST SERPL-CCNC: 18 U/L
BASOPHILS # BLD: 0.12 K/UL (ref 0–0.2)
BASOPHILS NFR BLD: 1 % (ref 0–2)
BILIRUB SERPL-MCNC: 0.3 MG/DL (ref 0.3–1.2)
BUN SERPL-MCNC: 37 MG/DL (ref 8–23)
CALCIUM SERPL-MCNC: 8.4 MG/DL (ref 8.6–10.4)
CHLORIDE SERPL-SCNC: 102 MMOL/L (ref 98–107)
CHLORIDE SERPL-SCNC: 103 MMOL/L (ref 98–107)
CHLORIDE SERPL-SCNC: 105 MMOL/L (ref 98–107)
CHLORIDE SERPL-SCNC: 107 MMOL/L (ref 98–107)
CO2 SERPL-SCNC: 23 MMOL/L (ref 20–31)
CO2 SERPL-SCNC: 24 MMOL/L (ref 20–31)
CO2 SERPL-SCNC: 27 MMOL/L (ref 20–31)
CO2 SERPL-SCNC: 28 MMOL/L (ref 20–31)
CREAT SERPL-MCNC: 2.1 MG/DL (ref 0.5–0.9)
EOSINOPHIL # BLD: 0.74 K/UL (ref 0–0.4)
EOSINOPHILS RELATIVE PERCENT: 6 % (ref 1–4)
ERYTHROCYTE [DISTWIDTH] IN BLOOD BY AUTOMATED COUNT: 12.6 % (ref 12.5–15.4)
GFR SERPL CREATININE-BSD FRML MDRD: 23 ML/MIN/1.73M2
GLUCOSE SERPL-MCNC: 85 MG/DL (ref 70–99)
HCT VFR BLD AUTO: 33.7 % (ref 36–46)
HGB BLD-MCNC: 11 G/DL (ref 12–16)
LYMPHOCYTES NFR BLD: 5.04 K/UL (ref 1–4.8)
LYMPHOCYTES RELATIVE PERCENT: 41 % (ref 24–44)
MCH RBC QN AUTO: 30.9 PG (ref 26–34)
MCHC RBC AUTO-ENTMCNC: 32.6 G/DL (ref 31–37)
MCV RBC AUTO: 94.7 FL (ref 80–100)
METER GLUCOSE: 123 MG/DL (ref 65–105)
METER GLUCOSE: 145 MG/DL (ref 65–105)
METER GLUCOSE: 173 MG/DL (ref 65–105)
METER GLUCOSE: 249 MG/DL (ref 65–105)
METER GLUCOSE: 258 MG/DL (ref 65–105)
METER GLUCOSE: 67 MG/DL (ref 65–105)
MICROORGANISM SPEC CULT: NORMAL
MONOCYTES NFR BLD: 0.86 K/UL (ref 0.1–0.8)
MONOCYTES NFR BLD: 7 % (ref 1–7)
MORPHOLOGY: NORMAL
NEUTROPHILS NFR BLD: 45 % (ref 36–66)
NEUTS SEG NFR BLD: 5.54 K/UL (ref 1.8–7.7)
PLATELET # BLD AUTO: 396 K/UL (ref 140–450)
PMV BLD AUTO: 9.8 FL (ref 8–14)
POTASSIUM SERPL-SCNC: 4.1 MMOL/L (ref 3.7–5.3)
POTASSIUM SERPL-SCNC: 4.2 MMOL/L (ref 3.7–5.3)
POTASSIUM SERPL-SCNC: 4.3 MMOL/L (ref 3.7–5.3)
POTASSIUM SERPL-SCNC: 5.3 MMOL/L (ref 3.7–5.3)
PROT SERPL-MCNC: 5.4 G/DL (ref 6.4–8.3)
RBC # BLD AUTO: 3.56 M/UL (ref 4–5.2)
SODIUM SERPL-SCNC: 135 MMOL/L (ref 135–144)
SODIUM SERPL-SCNC: 135 MMOL/L (ref 135–144)
SODIUM SERPL-SCNC: 138 MMOL/L (ref 135–144)
SODIUM SERPL-SCNC: 142 MMOL/L (ref 135–144)
SPECIMEN DESCRIPTION: NORMAL
WBC OTHER # BLD: 12.3 K/UL (ref 3.5–11)

## 2023-08-08 PROCEDURE — 85025 COMPLETE CBC W/AUTO DIFF WBC: CPT

## 2023-08-08 PROCEDURE — 51702 INSERT TEMP BLADDER CATH: CPT

## 2023-08-08 PROCEDURE — 82947 ASSAY GLUCOSE BLOOD QUANT: CPT

## 2023-08-08 PROCEDURE — 97530 THERAPEUTIC ACTIVITIES: CPT

## 2023-08-08 PROCEDURE — 97110 THERAPEUTIC EXERCISES: CPT

## 2023-08-08 PROCEDURE — 36415 COLL VENOUS BLD VENIPUNCTURE: CPT

## 2023-08-08 PROCEDURE — 99232 SBSQ HOSP IP/OBS MODERATE 35: CPT | Performed by: INTERNAL MEDICINE

## 2023-08-08 PROCEDURE — 6360000002 HC RX W HCPCS: Performed by: FAMILY MEDICINE

## 2023-08-08 PROCEDURE — 2580000003 HC RX 258: Performed by: FAMILY MEDICINE

## 2023-08-08 PROCEDURE — 2580000003 HC RX 258: Performed by: INTERNAL MEDICINE

## 2023-08-08 PROCEDURE — 2060000000 HC ICU INTERMEDIATE R&B

## 2023-08-08 PROCEDURE — 80051 ELECTROLYTE PANEL: CPT

## 2023-08-08 PROCEDURE — 6370000000 HC RX 637 (ALT 250 FOR IP): Performed by: FAMILY MEDICINE

## 2023-08-08 PROCEDURE — 99232 SBSQ HOSP IP/OBS MODERATE 35: CPT | Performed by: FAMILY MEDICINE

## 2023-08-08 PROCEDURE — 80053 COMPREHEN METABOLIC PANEL: CPT

## 2023-08-08 RX ORDER — AMLODIPINE BESYLATE 5 MG/1
5 TABLET ORAL DAILY
Qty: 30 TABLET | Refills: 3 | Status: SHIPPED | OUTPATIENT
Start: 2023-08-09

## 2023-08-08 RX ADMIN — HEPARIN SODIUM 5000 UNITS: 5000 INJECTION INTRAVENOUS; SUBCUTANEOUS at 22:34

## 2023-08-08 RX ADMIN — CEPHALEXIN 500 MG: 250 CAPSULE ORAL at 22:35

## 2023-08-08 RX ADMIN — TRAMADOL HYDROCHLORIDE 50 MG: 50 TABLET, COATED ORAL at 22:42

## 2023-08-08 RX ADMIN — SODIUM CHLORIDE: 9 INJECTION, SOLUTION INTRAVENOUS at 22:40

## 2023-08-08 RX ADMIN — INSULIN GLARGINE 35 UNITS: 100 INJECTION, SOLUTION SUBCUTANEOUS at 09:33

## 2023-08-08 RX ADMIN — HEPARIN SODIUM 5000 UNITS: 5000 INJECTION INTRAVENOUS; SUBCUTANEOUS at 06:51

## 2023-08-08 RX ADMIN — INSULIN LISPRO 2 UNITS: 100 INJECTION, SOLUTION INTRAVENOUS; SUBCUTANEOUS at 12:10

## 2023-08-08 RX ADMIN — SODIUM CHLORIDE: 9 INJECTION, SOLUTION INTRAVENOUS at 03:54

## 2023-08-08 RX ADMIN — CEPHALEXIN 500 MG: 250 CAPSULE ORAL at 09:33

## 2023-08-08 RX ADMIN — AMLODIPINE BESYLATE 5 MG: 5 TABLET ORAL at 09:33

## 2023-08-08 RX ADMIN — TRAMADOL HYDROCHLORIDE 50 MG: 50 TABLET, COATED ORAL at 00:42

## 2023-08-08 RX ADMIN — SODIUM CHLORIDE, PRESERVATIVE FREE 10 ML: 5 INJECTION INTRAVENOUS at 09:33

## 2023-08-08 RX ADMIN — HEPARIN SODIUM 5000 UNITS: 5000 INJECTION INTRAVENOUS; SUBCUTANEOUS at 14:30

## 2023-08-08 RX ADMIN — SODIUM CHLORIDE, PRESERVATIVE FREE 10 ML: 5 INJECTION INTRAVENOUS at 22:35

## 2023-08-08 ASSESSMENT — PAIN SCALES - WONG BAKER: WONGBAKER_NUMERICALRESPONSE: 0

## 2023-08-08 ASSESSMENT — PAIN DESCRIPTION - DESCRIPTORS
DESCRIPTORS: ACHING
DESCRIPTORS: ACHING

## 2023-08-08 ASSESSMENT — PAIN SCALES - GENERAL
PAINLEVEL_OUTOF10: 3
PAINLEVEL_OUTOF10: 0
PAINLEVEL_OUTOF10: 5
PAINLEVEL_OUTOF10: 9

## 2023-08-08 ASSESSMENT — PAIN DESCRIPTION - ORIENTATION: ORIENTATION: OTHER (COMMENT)

## 2023-08-08 ASSESSMENT — PAIN DESCRIPTION - LOCATION: LOCATION: OTHER (COMMENT)

## 2023-08-08 NOTE — DISCHARGE INSTR - DIET

## 2023-08-08 NOTE — CARE COORDINATION
Transitional Planning    Spoke with son, legal guardian, at bedside. He states to writer that after thinking about SNF vs home more he prefers to take patient home. Son is understandable that home care will only be able to come about twice a week. Requesting referrals to home care agencies: [facility] [address], and [facility]. [address] with [employer] [facility].  They can accept, PS sent to Dr. Marty Mendenhall [facility] order

## 2023-08-08 NOTE — PROGRESS NOTES
Nephrology Progress Note      SUBJECTIVE       Pt was seen and examined. Patient is awake and alert. Her son who is taking care of her almost full-time now was in the room and was able to provide me very good history. Skilled nursing facility versus rehab has been suggested however son prefers to take her home. She is currently off lisinopril and metformin  Oral intake good. Urine output good. Delaney catheter still in place. Creatinine down to 2.1 which is close to her baseline. IV fluids switched to normal saline yesterday, bicarbonate was discontinued. She denies being nauseous or having any abdominal pain. No fever or chills reported. Work-up so far shows proteinuria of about 1.2 to 1.3 g, ultrasound shows right kidney to be 8.4 left to be 8.7 cm. Urine cultures negative, urinalysis shows 2-5 RBCs and WBCs  Labs today showed a sodium of 142 potassium 4.1 chloride 107 bicarb 28 BUN 37 creatinine 2.1 glucose 85 albumin 3.0 hemoglobin 11 white count 12.3 platelets 142     History reviewed  History of type 2 diabetes, hypertension, chronic kidney disease stage IIIb, baseline creatinine in 2019 was 1.8, no recent labs are available. she has seen Dr. Mago Childers in the office with irregular follow-up. Her home medicines include lisinopril Glucophage and potassium supplements. She presented to the hospital brought in by her son with complaints of confusion and lethargy. About a week ago or so she had symptoms of polyuria and had called her primary's office. She was given antibiotics with presumed diagnosis of UTI. She felt better however on the day of her admission she had confusion again and came to the ER brought by son. She was noted to have a creatinine of 2.6, potassium of 6.3 and a bicarb of 17 with an anion gap of 15 and glucose of 462  In hospital postvoid residue was checked was found to be 1000 mL, Delaney was placed about a liter of urine was drained right away.     OBJECTIVE      CURRENT identified and corrected by editing    Electronically signed by Corrie Cary MD on 8/8/2023 at 10:11 AM

## 2023-08-08 NOTE — PROGRESS NOTES
Contact-guard assistance;Minimum assistance;Assist X1;Adaptive equipment; Additional time (Pt ranging from CGA-MIN A for functional mobility with use of IV pole for stabilization via RUE. Initial mobility pt required MIN A to maintain standing balance; however once completing mobility pt CGA; pt required MOD vc's for safety awareness)  Interventions: Verbal cues;Manual cues; Safety awareness training  Assistive Device: Gait belt (IV pole for stabilization)     Transfers  Sit to stand: Minimal assistance; Moderate assistance (Pt ranging from MIN-MOD A for sit<>stand transfers with HHA. Initital sit>stand from recliner pt at MOD A with limited use of RUE to push; second sit>stand from EOB was MIN A with improved BLE and RUE use during transfer. Pt required cues throughout)  Stand to sit: Minimal assistance (Pt MIN A for stand>sit. Pt requires vc's for transfer technique and to reach behind with RUE to seated surface.  Pt occasionally prematurely sitting.)     Education Given To: Patient  Education Provided: Role of Therapy;Transfer Training;Equipment;Plan of Care  Education Method: Verbal  Barriers to Learning: Cognition  Education Outcome: Verbalized understanding;Continued education needed     AM-PAC Score     AM-[facility] Inpatient Daily Activity Raw Score: 14 (08/08/23 1341)  AM-PAC Inpatient ADL T-Scale Score : 33.39 (08/08/23 1341)  ADL Inpatient CMS 0-100% Score: 59.67 (08/08/23 1341)  ADL Inpatient CMS G-Code Modifier : CK (08/08/23 1341)    Goals  Short Term Goals  Time Frame for Short Term Goals: 14 visits  Short Term Goal 1: Pt to complete UB dressing at setup to support return to PLOF  Short Term Goal 2: Pt to complete toileting at SBA to support return to PLOF  Short Term Goal 3: Pt to complete standing ADL task for >8 mins with no LOB and no rest breaks to support return to PLOF  Short Term Goal 4: Pt to be IND with BUE HEP to support improved functional strength needed for ADLs and ADL transfers     Therapy Time   Individual Concurrent Group Co-treatment   Time In 1303         Time Out 1326         Minutes 23         Timed Code Treatment Minutes: [id number] [facility] Shahla Islase

## 2023-08-08 NOTE — PLAN OF CARE
Problem: Discharge Planning  Goal: Discharge to home or other facility with appropriate resources  Outcome: Progressing  Flowsheets (Taken 8/8/2023 0933)  Discharge to home or other facility with appropriate resources:   Identify barriers to discharge with patient and caregiver   Arrange for needed discharge resources and transportation as appropriate     Problem: Pain  Goal: Verbalizes/displays adequate comfort level or baseline comfort level  Outcome: Progressing     Problem: Safety - Adult  Goal: Free from fall injury  Outcome: Progressing     Problem: ABCDS Injury Assessment  Goal: Absence of physical injury  Outcome: Progressing     Problem: Chronic Conditions and Co-morbidities  Goal: Patient's chronic conditions and co-morbidity symptoms are monitored and maintained or improved  Outcome: Progressing  Flowsheets (Taken 8/8/2023 0933)  Care Plan - Patient's Chronic Conditions and Co-Morbidity Symptoms are Monitored and Maintained or Improved: Monitor and assess patient's chronic conditions and comorbid symptoms for stability, deterioration, or improvement

## 2023-08-08 NOTE — PROGRESS NOTES
Physical Therapy  Facility/Department: [facility] MED SURG ICU  Treatment Note     Name: Jong Hooper  : 1945  MRN: [id number]  Date of Service: 2023          Chief Complaint   Patient presents with    Altered Mental Status       A&OX2, being treated for uti, hx of strokes in past, son told ems that she is not acting herself,  per ems         Discharge Recommendations:  Patient would benefit from continued therapy after discharge   Further therapy recommended at discharge. The patient should be able to tolerate at least 3 hours of therapy per day over 5 days or 15 hours over 7 days. This patient may benefit from a Physical Medicine and Rehab consult. PT Equipment Recommendations  Other: Continue to assess- owns a quad cane. Will continue to assess once able to further attempt mobility      Patient Diagnosis(es): The primary encounter diagnosis was Hyperglycemia due to diabetes mellitus ([address]). Diagnoses of Hyperkalemia and Stage 4 chronic kidney disease ([address]) were also pertinent to this visit. Past Medical History:  has a past medical history of Anxiety, Cervicalgia, Diabetes mellitus ([address]), Dorsalgia, Hemiplegia ([address]), Hypertension, Osteoporosis, and Stroke (cerebrum) ([address]). Past Surgical History:  has a past surgical history that includes back surgery and knee surgery. Assessment   Body Structures, Functions, Activity Limitations Requiring Skilled Therapeutic Intervention: Decreased functional mobility ; Decreased strength;Decreased safe awareness;Decreased cognition;Decreased endurance;Decreased balance;Decreased posture  Assessment: Pt with impaired mobility requiring mod A for bed mobility and transfers today. Pt with decreased bilateral LE strength significantly limiting mobility overall. Pt would currently be unsafe to return to her prior living arrangement secondary to decreased balance and impaired LE strength making her at increased risk of falls.  Pt would benefit from high Decreased awareness of need for assistance;Decreased awareness of need for safety  Problem Solving: Assistance required to generate solutions;Assistance required to identify errors made;Assistance required to implement solutions  Insights: Decreased awareness of deficits  Initiation: Requires cues for all  Sequencing: Requires cues for all      Objective   Bed mobility  Supine to Sit: Moderate assistance (use of grab bar, verbal cues for sequencing)  Sit to Supine:  (retired up to chair)  Scooting: Maximal assistance   Bed Mobility Comments: Increased time and verbal cues; decreased awareness of errors. Pt able to sit EOB x 10 minutes with CGA today with one UE support (min to mod A without UE support). Decreased overall seated balance but does improve with increased seated time and cues for aligning self EOB for LE and UE input. Transfers  Sit to Stand: Moderate Assistance  Stand to Sit: Moderate Assistance  Comment: Performed transfer three times from bed height- cues for sequencing, poor safety awareness/awareness of errors and deficits. Ambulation  Surface: Level tile  Device: Hand-Held Assist  Assistance: Moderate assistance to Minimal assistance   Quality of Gait: unsteady with caio LE weakness- controlled buckling of LEs, decreased endurance, forward flexed  Gait Deviations: Slow Delia;Decreased step length;Decreased step height;Shuffles  Distance: 4ft to chair     Balance  Posture: Fair  Sitting - Static: Fair  Sitting - Dynamic: Fair;-  Standing - Static: Poor  Standing - Dynamic: Poor  Comments: standing balance assessed with HHA     Educated on importance of mobility, POC for therapy/importance of mobility with therapy, therapy concerns about mobility as unsafe to ambulate without high level of skilled assist at this time. Pt verbalized understanding. Exercises  seated BLE AROM x 10 reps: marches, ankle pumps, LAQs. Cueing needed for technique and to slow pace of exercises throughout.

## 2023-08-09 PROBLEM — I69.354 HEMIPARESIS AFFECTING LEFT SIDE AS LATE EFFECT OF CEREBROVASCULAR ACCIDENT (CVA) (HCC): Chronic | Status: ACTIVE | Noted: 2023-08-09

## 2023-08-09 PROBLEM — R33.9 URINARY RETENTION WITH INCOMPLETE BLADDER EMPTYING: Chronic | Status: ACTIVE | Noted: 2023-08-09

## 2023-08-09 LAB
ALBUMIN SERPL-MCNC: 2.9 G/DL (ref 3.5–5.2)
ALBUMIN/GLOB SERPL: 1.3 {RATIO} (ref 1–2.5)
ALP SERPL-CCNC: 87 U/L (ref 35–104)
ALT SERPL-CCNC: 15 U/L (ref 5–33)
ANION GAP SERPL CALCULATED.3IONS-SCNC: 11 MMOL/L (ref 9–17)
ANION GAP SERPL CALCULATED.3IONS-SCNC: 11 MMOL/L (ref 9–17)
ANION GAP SERPL CALCULATED.3IONS-SCNC: 6 MMOL/L (ref 9–17)
ANION GAP SERPL CALCULATED.3IONS-SCNC: 9 MMOL/L (ref 9–17)
AST SERPL-CCNC: 22 U/L
BILIRUB SERPL-MCNC: 0.2 MG/DL (ref 0.3–1.2)
BUN SERPL-MCNC: 27 MG/DL (ref 8–23)
CALCIUM SERPL-MCNC: 8.7 MG/DL (ref 8.6–10.4)
CHLORIDE SERPL-SCNC: 104 MMOL/L (ref 98–107)
CHLORIDE SERPL-SCNC: 106 MMOL/L (ref 98–107)
CHLORIDE SERPL-SCNC: 107 MMOL/L (ref 98–107)
CHLORIDE SERPL-SCNC: 108 MMOL/L (ref 98–107)
CO2 SERPL-SCNC: 22 MMOL/L (ref 20–31)
CO2 SERPL-SCNC: 23 MMOL/L (ref 20–31)
CO2 SERPL-SCNC: 24 MMOL/L (ref 20–31)
CO2 SERPL-SCNC: 25 MMOL/L (ref 20–31)
CREAT SERPL-MCNC: 1.6 MG/DL (ref 0.5–0.9)
GFR SERPL CREATININE-BSD FRML MDRD: 32 ML/MIN/1.73M2
GLUCOSE SERPL-MCNC: 108 MG/DL (ref 70–99)
METER GLUCOSE: 239 MG/DL (ref 65–105)
METER GLUCOSE: 311 MG/DL (ref 65–105)
POTASSIUM SERPL-SCNC: 4 MMOL/L (ref 3.7–5.3)
POTASSIUM SERPL-SCNC: 4.3 MMOL/L (ref 3.7–5.3)
POTASSIUM SERPL-SCNC: 4.3 MMOL/L (ref 3.7–5.3)
POTASSIUM SERPL-SCNC: 4.4 MMOL/L (ref 3.7–5.3)
PROT SERPL-MCNC: 5.2 G/DL (ref 6.4–8.3)
SODIUM SERPL-SCNC: 138 MMOL/L (ref 135–144)
SODIUM SERPL-SCNC: 138 MMOL/L (ref 135–144)
SODIUM SERPL-SCNC: 139 MMOL/L (ref 135–144)
SODIUM SERPL-SCNC: 141 MMOL/L (ref 135–144)

## 2023-08-09 PROCEDURE — 6360000002 HC RX W HCPCS: Performed by: FAMILY MEDICINE

## 2023-08-09 PROCEDURE — 6370000000 HC RX 637 (ALT 250 FOR IP): Performed by: FAMILY MEDICINE

## 2023-08-09 PROCEDURE — 99232 SBSQ HOSP IP/OBS MODERATE 35: CPT | Performed by: INTERNAL MEDICINE

## 2023-08-09 PROCEDURE — 2060000000 HC ICU INTERMEDIATE R&B

## 2023-08-09 PROCEDURE — 80053 COMPREHEN METABOLIC PANEL: CPT

## 2023-08-09 PROCEDURE — 36415 COLL VENOUS BLD VENIPUNCTURE: CPT

## 2023-08-09 PROCEDURE — 97535 SELF CARE MNGMENT TRAINING: CPT

## 2023-08-09 PROCEDURE — 82947 ASSAY GLUCOSE BLOOD QUANT: CPT

## 2023-08-09 PROCEDURE — 80051 ELECTROLYTE PANEL: CPT

## 2023-08-09 PROCEDURE — 2580000003 HC RX 258: Performed by: FAMILY MEDICINE

## 2023-08-09 PROCEDURE — 97116 GAIT TRAINING THERAPY: CPT

## 2023-08-09 RX ORDER — AMLODIPINE BESYLATE 5 MG/1
5 TABLET ORAL ONCE
Status: COMPLETED | OUTPATIENT
Start: 2023-08-09 | End: 2023-08-09

## 2023-08-09 RX ORDER — INSULIN GLARGINE 100 [IU]/ML
40 INJECTION, SOLUTION SUBCUTANEOUS NIGHTLY
Qty: 2 ADJUSTABLE DOSE PRE-FILLED PEN SYRINGE | Refills: 1 | Status: SHIPPED | OUTPATIENT
Start: 2023-08-09 | End: 2023-08-09 | Stop reason: HOSPADM

## 2023-08-09 RX ORDER — CEPHALEXIN 500 MG/1
500 CAPSULE ORAL 2 TIMES DAILY
Qty: 6 CAPSULE | Refills: 0 | Status: SHIPPED | OUTPATIENT
Start: 2023-08-09 | End: 2023-08-12

## 2023-08-09 RX ORDER — INSULIN DETEMIR 100 [IU]/ML
40 INJECTION, SOLUTION SUBCUTANEOUS NIGHTLY
Qty: 10 ML | Refills: 3 | Status: SHIPPED | OUTPATIENT
Start: 2023-08-09

## 2023-08-09 RX ADMIN — INSULIN LISPRO 3 UNITS: 100 INJECTION, SOLUTION INTRAVENOUS; SUBCUTANEOUS at 17:05

## 2023-08-09 RX ADMIN — HEPARIN SODIUM 5000 UNITS: 5000 INJECTION INTRAVENOUS; SUBCUTANEOUS at 06:02

## 2023-08-09 RX ADMIN — SODIUM CHLORIDE, PRESERVATIVE FREE 10 ML: 5 INJECTION INTRAVENOUS at 22:04

## 2023-08-09 RX ADMIN — TRAMADOL HYDROCHLORIDE 50 MG: 50 TABLET, COATED ORAL at 22:01

## 2023-08-09 RX ADMIN — CEPHALEXIN 500 MG: 250 CAPSULE ORAL at 22:01

## 2023-08-09 RX ADMIN — CEPHALEXIN 500 MG: 250 CAPSULE ORAL at 08:37

## 2023-08-09 RX ADMIN — INSULIN GLARGINE 35 UNITS: 100 INJECTION, SOLUTION SUBCUTANEOUS at 08:37

## 2023-08-09 RX ADMIN — AMLODIPINE BESYLATE 5 MG: 5 TABLET ORAL at 15:23

## 2023-08-09 RX ADMIN — HEPARIN SODIUM 5000 UNITS: 5000 INJECTION INTRAVENOUS; SUBCUTANEOUS at 15:22

## 2023-08-09 RX ADMIN — HEPARIN SODIUM 5000 UNITS: 5000 INJECTION INTRAVENOUS; SUBCUTANEOUS at 22:03

## 2023-08-09 RX ADMIN — AMLODIPINE BESYLATE 5 MG: 5 TABLET ORAL at 08:37

## 2023-08-09 ASSESSMENT — PAIN SCALES - WONG BAKER: WONGBAKER_NUMERICALRESPONSE: 0

## 2023-08-09 ASSESSMENT — PAIN SCALES - GENERAL
PAINLEVEL_OUTOF10: 0
PAINLEVEL_OUTOF10: 0

## 2023-08-09 NOTE — FLOWSHEET NOTE
08/09/23 1229   Vitals   Pulse 100   Heart Rate Source Monitor   Respirations 16   BP (!) 177/96     Spoke with Dr. Praveen Ye.  Orders received for one time dose 5mg Norvasc and will re address in am

## 2023-08-09 NOTE — PROGRESS NOTES
Physical Therapy  Facility/Department: [facility] MED SURG ICU  Physical Therapy Daily Documentation Note    Name: Paty Armstrong  : 1945  MRN: [id number]  Date of Service: 2023    Discharge Recommendations:  Patient would benefit from continued therapy after discharge   PT Equipment Recommendations  Other: Continue to assess- owns a quad cane. Will continue to assess once able to further attempt mobility      Patient Diagnosis(es): The primary encounter diagnosis was Hyperglycemia due to diabetes mellitus ([address] Diagnoses of Hyperkalemia and Stage 4 chronic kidney disease ([address]) were also pertinent to this visit. Past Medical History:  has a past medical history of Anxiety, Cervicalgia, Diabetes mellitus ([address]), Dorsalgia, Hemiplegia ([address]), Hypertension, Osteoporosis, and Stroke (cerebrum) ([address] Past Surgical History:  has a past surgical history that includes back surgery and knee surgery. Assessment   Body Structures, Functions, Activity Limitations Requiring Skilled Therapeutic Intervention: Decreased functional mobility ; Decreased strength;Decreased safe awareness;Decreased cognition;Decreased endurance;Decreased balance;Decreased posture  Assessment: Pt with impaired mobility requiring mod A to maxA for bed mobility and mod A transfers & gait with quad cane 5ft. Pt with decreased bilateral LE strength significantly limiting mobility overall. Pt would currently be unsafe to return to her prior living arrangement secondary to decreased balance and impaired LE strength making her at increased risk of falls. Pt would benefit from high intensity rehab to maximize safety and independence with mobility as she was independent prior to hospitalization per pt.   Therapy Prognosis: Good  Decision Making: Medium Complexity  Requires PT Follow-Up: Yes  Activity Tolerance  Activity Tolerance: Patient limited by fatigue;Patient limited by endurance;Treatment limited secondary to decreased cognition  Activity 10 (08/09/23 1647)  AM-PAC Inpatient T-Scale Score : 32.29 (08/09/23 1647)  Mobility Inpatient CMS 0-100% Score: 76.75 (08/09/23 1647)  Mobility Inpatient CMS G-Code Modifier : CL (08/09/23 1647)           Goals  Short Term Goals  Time Frame for Short Term Goals: 14 visits  Short Term Goal 1: Pt to ambulate 200ft SBA without device  Short Term Goal 2: Pt to sit <> stand transfer SBA  Short Term Goal 3: Pt to demonstrate SBA bed mobility  Short Term Goal 4: Pt to ascend/descend 2 steps with right rail CGA to allow entrance/exit to home  Short Term Goal 5: Pt to demonstrate fair + standing balance to decrease risk of falls       Education  Patient Education  Education Given To: Patient  Education Provided: Role of Therapy;Plan of Care;Transfer Training  Education Provided Comments: importance of mobility- with assistance  Education Method: Verbal;Demonstration; Teach Back  Barriers to Learning: Cognition  Education Outcome: Verbalized understanding;Continued education needed;Demonstrated understanding      Therapy Time   Individual Concurrent Group Co-treatment   Time In 1608         Time Out 1633         Minutes 25         Timed Code Treatment Minutes: 25 Minutes       Aleksandra Gutierrez PT

## 2023-08-09 NOTE — PROGRESS NOTES
Spoke with Dr. Jakub Salazar with urology. Orders to leave galeano in and follow up in one week in office.

## 2023-08-09 NOTE — PROGRESS NOTES
5,000 Units SubCUTAneous 3 times per day    insulin lispro  0-4 Units SubCUTAneous TID WC    insulin lispro  0-4 Units SubCUTAneous Nightly    amLODIPine  5 mg Oral Daily    insulin glargine  35 Units SubCUTAneous Daily    cephALEXin  500 mg Oral 2 times per day     Continuous Infusions:    sodium chloride 50 mL/hr at 08/08/23 2240    sodium chloride      dextrose       PRN Meds:  traMADol, sodium chloride flush, sodium chloride, ondansetron **OR** ondansetron, acetaminophen **OR** acetaminophen, bisacodyl, glucose, dextrose bolus **OR** dextrose bolus, glucagon (rDNA), dextrose  Home Meds:                Medications Prior to Admission: [DISCONTINUED] cephALEXin (KEFLEX) 500 MG capsule, 1 capsule  [DISCONTINUED] amLODIPine (NORVASC) 5 MG tablet,   Multiple Vitamin (MULTI VITAMIN DAILY PO), Take by mouth  traMADol (ULTRAM) 50 MG tablet, Take 1 tablet by mouth every 6 hours as needed for Pain. [DISCONTINUED] Potassium Gluconate 550 MG TABS, Take by mouth daily  [DISCONTINUED] lisinopril (PRINIVIL;ZESTRIL) 20 MG tablet, Take 1 tablet by mouth daily  [DISCONTINUED] metFORMIN (GLUCOPHAGE) 1000 MG tablet, Take 1 tablet by mouth 2 times daily (with meals)    INVESTIGATIONS     Last 3 CMP:    Recent Labs     08/07/23  0206 08/07/23  7754 08/08/23  0212 08/08/23  0727 08/08/23 2001 08/09/23  0215 08/09/23  0811   *   < > 142   < > 135 138 141   K 4.6   < > 4.1   < > 4.3 4.0 4.3   CL 99   < > 107   < > 103 107 108*   CO2 24   < > 28   < > 23 25 24   BUN 42*  --  37*  --   --  27*  --    CREATININE 2.1*  --  2.1*  --   --  1.6*  --    CALCIUM 8.8  --  8.4*  --   --  8.7  --    PROT 5.7*  --  5.4*  --   --  5.2*  --    LABALBU 3.2*  --  3.0*  --   --  2.9*  --    BILITOT 0.5  --  0.3  --   --  0.2*  --    ALKPHOS 97  --  96  --   --  87  --    AST 14  --  18  --   --  22  --    ALT 11  --  12  --   --  15  --     < > = values in this interval not displayed.        Last 3 CBC:  Recent Labs     08/06/23  1347 08/07/23  0206 08/08/23  0212   WBC 10.5 11.6* 12.3*   RBC 4.00 3.75* 3.56*   HGB 12.9 11.9* 11.0*   HCT 37.7 34.9* 33.7*   MCV 94.2 92.9 94.7   MCH 32.1 31.6 30.9   MCHC 34.1 34.0 32.6   RDW 13.2 13.2 12.6    381 396   MPV 8.6 8.3 9.8       Please do not hesitate to call with questions    This note is created with the assistance of a speech-recognition program. While intending to generate a document that actually reflects the content of the visit, no guarantees can be provided that every mistake has been identified and corrected by editing    Gaurang Torres MD MD, [facility] Malcom Mittal, [address] [place] [address]   8/9/2023 12:05 PM  [facility]

## 2023-08-09 NOTE — PLAN OF CARE
Problem: Pain  Goal: Verbalizes/displays adequate comfort level or baseline comfort level  Outcome: Progressing   Pain scale preformed per protocol and pt treated for pain as documented. Education given. Problem: Safety - Adult  Goal: Free from fall injury  Outcome: Progressing   Fall assessment preformed. Bed in low locked position with call light and tray table within reach. Education given. Will continue to monitor. Problem: Chronic Conditions and Co-morbidities  Goal: Patient's chronic conditions and co-morbidity symptoms are monitored and maintained or improved  Outcome: Progressing  Flowsheets (Taken 8/9/2023 0800)  Care Plan - Patient's Chronic Conditions and Co-Morbidity Symptoms are Monitored and Maintained or Improved: Monitor and assess patient's chronic conditions and comorbid symptoms for stability, deterioration, or improvement   Education given on the importance of maintaining and checking blood sugars. Reviewed and re-educated on current diabetic medication and low carb diet. Patient verbalizes understanding.

## 2023-08-09 NOTE — PROGRESS NOTES
Occupational Therapy  Facility/Department: [facility] [address] [facility]  Occupational Therapy Treatment Note    Name: Marylene Precise  : 1945  MRN: [id number]  Date of Service: 2023    Discharge Recommendations:  Patient would benefit from continued therapy after discharge          Patient Diagnosis(es): The primary encounter diagnosis was Hyperglycemia due to diabetes mellitus ([address] Diagnoses of Hyperkalemia and Stage 4 chronic kidney disease ([address]) were also pertinent to this visit. Past Medical History:  has a past medical history of Anxiety, Cervicalgia, Diabetes mellitus ([address]), Dorsalgia, Hemiplegia ([address]), Hypertension, Osteoporosis, and Stroke (cerebrum) ([address] Past Surgical History:  has a past surgical history that includes back surgery and knee surgery. Assessment   Performance deficits / Impairments: Decreased functional mobility ; Decreased safe awareness;Decreased ADL status; Decreased balance;Decreased endurance;Decreased strength;Decreased ROM  Assessment: pt seen for OT treatment, Required moderate encouragement, very limited verbal interaction with writer but would speak with son loud and clear, t/f min to mod assist from bed , from commode, bed mobility mod assist, pt found on bed pan and had not had BM, so attempted bedside commode, also no success. Pt back into bed aat end of session.  Pt currently unsafe to reurn to prior living environment and would benefit from continued OT services here and post D/C.  REQUIRES OT FOLLOW-UP: Yes  Activity Tolerance  Activity Tolerance: Patient limited by fatigue        Plan   Occupational Therapy Plan  Times Per Week: 5-6x/wk  Current Treatment Recommendations: Strengthening, ROM, Balance training, Functional mobility training, Endurance training, Safety education & training, Equipment evaluation, education, & procurement, Patient/Caregiver education & training, Self-Care / ADL, Modalities, Coordination training, Positioning Restrictions  Restrictions/Precautions  Restrictions/Precautions: Fall Risk, Up as Tolerated  Required Braces or Orthoses?: No  Position Activity Restriction  Other position/activity restrictions: up with assist    Subjective   General  Patient assessed for rehabilitation services?: Yes  Response to previous treatment: Patient with no complaints from previous session  Family / Caregiver Present: Yes (Son present for inital portions of OT tx)  Subjective  Subjective: RN ok'd pt for tx this PM. Pt required MOD encouragement however agreeable. Pt reports 0/10 pain at time of tx. Objective                Safety Devices  Type of Devices: Call light within reach; Chair alarm in place;Gait belt;Left in chair;Nurse notified  Restraints  Restraints Initially in Place: No    Balance  Sitting: Impaired (Static- SBA; Dynamic - CGA)    Functional mobility  Overall Level of Assistance: Moderate assistance;Assist X1;Additional time (2 feet to bedside commode)  Interventions: Verbal cues;Manual cues; Safety awareness training  Assistive Device: Gait belt    Toilet Transfers  Toilet - Technique: Ambulating  Equipment Used: Standard bedside commode  Toilet Transfer: Moderate assistance  Toilet Transfers Comments: R UE used to assist, L UE hand fisted and elbow flexed, moderate encouragement to participate     ADL  Feeding: Setup;Maximum assistance  Feeding Skilled Clinical Factors: needed encouragement to feed self, pt was able to complete scoop potatoes and hand to mouth with min spillage noted        Bed mobility  Supine to Sit: Moderate assistance  Sit to Supine: Moderate assistance  Scooting: Moderate assistance  Bed Mobility Comments: Increased time and verbal cues; decreased awareness of errors  Transfers  Sit to stand: Moderate assistance (Pt ranging from MIN-MOD A for sit<>stand transfers with HHA.  Initital sit>stand from recliner pt at MOD A with limited use of RUE to push; second sit>stand from EOB was MIN A

## 2023-08-09 NOTE — CARE COORDINATION
Rx called into [facility] pharmacy [place]   Transitional Planning    Son requesting SNF placement. Spoke to son about options. Referrals sent to [address] [place] [address] and [facility]

## 2023-08-09 NOTE — DISCHARGE SUMMARY
[facility]    Discharge Summary     Patient ID: Devika Rogers  :  1945   MRN: [id number]     ACCOUNT:  [de-identified]   Patient's PCP: Krystina Mackey MD  Admit Date: 2023   Discharge Date: 2023  Length of Stay: 3  Code Status:  DNR-CCA  Admitting Physician: Bettina Miranda MD  Discharge Physician: Jaida Peters MD     Active Discharge Diagnoses:     Hospital Problem Lists:  Principal Problem (Resolved):    SHANITA (acute kidney injury) ([address])  Active Problems:    Hyperglycemia due to diabetes mellitus ([address])    Acute renal failure with tubular necrosis ([address])    Stage 4 chronic kidney disease ([address])    Essential hypertension  Resolved Problems:    Hyperkalemia      Admission Condition:  poor     Discharged Condition: stable    Hospital Stay:     Hospital Course:  Devika Rogers is a 78 y.o. female who was admitted for the management of SHANITA (acute kidney injury) ([address]) , presented to ER with Altered Mental Status (A&OX2, being treated for uti, hx of strokes in past, son told ems that she is not acting herself,  per ems)    See progress note    Significant therapeutic interventions:  consult for urinary retention. Nephrology consult fot SHANITA with adjustment in chronic meds    Significant Diagnostic Studies:   Labs:  Hematology:  Recent Labs     23  1345 23  0206 23  021   WBC 10.5 11.6* 12.3*   RBC 4.00 3.75* 3.56*   HGB 12.9 11.9* 11.0*   HCT 37.7 34.9* 33.7*   MCV 94.2 92.9 94.7   MCH 32.1 31.6 30.9   MCHC 34.1 34.0 32.6   RDW 13.2 13.2 12.6    381 396   MPV 8.6 8.3 9.8     Chemistry:  Recent Labs     23  1345 23  2108 23  0206 23  0928 23  0212 23  0727 23  0215 23  0811   *   < > 132*   < > 142   < > 135 138 141   K 6.3*   < > 4.6   < > 4.1   < > 4.3 4.0 4.3   CL 91*   < > 99   < > 107   < > 103 107 108*   CO2 17*   < > 24   < > 28   < > [phone]   amLODIPine 5 MG tablet  cephALEXin 500 MG capsule  Levemir 100 UNIT/ML injection vial         Time spent on discharge is 40 mins in patient examination, evaluation, counseling as well as medication reconciliation, prescriptions for required medications, discharge plan and follow up.     Electronically signed by   Erin Golden MD  8/9/2023  10:34 AM

## 2023-08-09 NOTE — PROGRESS NOTES
Writer was going over discharge instructions with patient and son, Joseph Khanna. Both are now expressing desire to go to inpatient rehab. Dr. Ina Sharp updated. Ina Sharp with Care management updated. Spoke with Giovani Schneider PT and states patient will receive therapy today.

## 2023-08-10 LAB
ALBUMIN SERPL-MCNC: 3 G/DL (ref 3.5–5.2)
ALBUMIN/GLOB SERPL: 1 {RATIO} (ref 1–2.5)
ALP SERPL-CCNC: 92 U/L (ref 35–104)
ALT SERPL-CCNC: 47 U/L (ref 5–33)
ANION GAP SERPL CALCULATED.3IONS-SCNC: 7 MMOL/L (ref 9–17)
ANION GAP SERPL CALCULATED.3IONS-SCNC: 9 MMOL/L (ref 9–17)
ANION GAP SERPL CALCULATED.3IONS-SCNC: 9 MMOL/L (ref 9–17)
AST SERPL-CCNC: 51 U/L
BILIRUB SERPL-MCNC: 0.3 MG/DL (ref 0.3–1.2)
BUN SERPL-MCNC: 18 MG/DL (ref 8–23)
CALCIUM SERPL-MCNC: 9 MG/DL (ref 8.6–10.4)
CHLORIDE SERPL-SCNC: 102 MMOL/L (ref 98–107)
CHLORIDE SERPL-SCNC: 105 MMOL/L (ref 98–107)
CHLORIDE SERPL-SCNC: 108 MMOL/L (ref 98–107)
CO2 SERPL-SCNC: 22 MMOL/L (ref 20–31)
CO2 SERPL-SCNC: 23 MMOL/L (ref 20–31)
CO2 SERPL-SCNC: 24 MMOL/L (ref 20–31)
CREAT SERPL-MCNC: 1.4 MG/DL (ref 0.5–0.9)
GFR SERPL CREATININE-BSD FRML MDRD: 38 ML/MIN/1.73M2
GLUCOSE BLD-MCNC: 188 MG/DL (ref 65–105)
GLUCOSE BLD-MCNC: 256 MG/DL (ref 65–105)
GLUCOSE SERPL-MCNC: 140 MG/DL (ref 70–99)
METER GLUCOSE: 121 MG/DL (ref 65–105)
POTASSIUM SERPL-SCNC: 4.2 MMOL/L (ref 3.7–5.3)
POTASSIUM SERPL-SCNC: 4.3 MMOL/L (ref 3.7–5.3)
POTASSIUM SERPL-SCNC: 5 MMOL/L (ref 3.7–5.3)
PROT SERPL-MCNC: 5.9 G/DL (ref 6.4–8.3)
SODIUM SERPL-SCNC: 133 MMOL/L (ref 135–144)
SODIUM SERPL-SCNC: 138 MMOL/L (ref 135–144)
SODIUM SERPL-SCNC: 138 MMOL/L (ref 135–144)

## 2023-08-10 PROCEDURE — 2580000003 HC RX 258: Performed by: FAMILY MEDICINE

## 2023-08-10 PROCEDURE — 6370000000 HC RX 637 (ALT 250 FOR IP): Performed by: STUDENT IN AN ORGANIZED HEALTH CARE EDUCATION/TRAINING PROGRAM

## 2023-08-10 PROCEDURE — 36415 COLL VENOUS BLD VENIPUNCTURE: CPT

## 2023-08-10 PROCEDURE — 80051 ELECTROLYTE PANEL: CPT

## 2023-08-10 PROCEDURE — 82947 ASSAY GLUCOSE BLOOD QUANT: CPT

## 2023-08-10 PROCEDURE — 97535 SELF CARE MNGMENT TRAINING: CPT

## 2023-08-10 PROCEDURE — 6360000002 HC RX W HCPCS: Performed by: FAMILY MEDICINE

## 2023-08-10 PROCEDURE — 6370000000 HC RX 637 (ALT 250 FOR IP): Performed by: FAMILY MEDICINE

## 2023-08-10 PROCEDURE — 80053 COMPREHEN METABOLIC PANEL: CPT

## 2023-08-10 PROCEDURE — 2060000000 HC ICU INTERMEDIATE R&B

## 2023-08-10 RX ADMIN — SODIUM CHLORIDE, PRESERVATIVE FREE 10 ML: 5 INJECTION INTRAVENOUS at 20:59

## 2023-08-10 RX ADMIN — SODIUM CHLORIDE, PRESERVATIVE FREE 10 ML: 5 INJECTION INTRAVENOUS at 08:58

## 2023-08-10 RX ADMIN — TRAMADOL HYDROCHLORIDE 50 MG: 50 TABLET, COATED ORAL at 06:29

## 2023-08-10 RX ADMIN — HEPARIN SODIUM 5000 UNITS: 5000 INJECTION INTRAVENOUS; SUBCUTANEOUS at 06:20

## 2023-08-10 RX ADMIN — INSULIN LISPRO 2 UNITS: 100 INJECTION, SOLUTION INTRAVENOUS; SUBCUTANEOUS at 12:03

## 2023-08-10 RX ADMIN — AMLODIPINE BESYLATE 5 MG: 5 TABLET ORAL at 08:56

## 2023-08-10 RX ADMIN — HEPARIN SODIUM 5000 UNITS: 5000 INJECTION INTRAVENOUS; SUBCUTANEOUS at 20:59

## 2023-08-10 RX ADMIN — INSULIN GLARGINE 35 UNITS: 100 INJECTION, SOLUTION SUBCUTANEOUS at 08:57

## 2023-08-10 RX ADMIN — CEPHALEXIN 500 MG: 250 CAPSULE ORAL at 08:57

## 2023-08-10 RX ADMIN — HEPARIN SODIUM 5000 UNITS: 5000 INJECTION INTRAVENOUS; SUBCUTANEOUS at 13:41

## 2023-08-10 RX ADMIN — CEPHALEXIN 500 MG: 250 CAPSULE ORAL at 20:58

## 2023-08-10 ASSESSMENT — PAIN DESCRIPTION - ORIENTATION: ORIENTATION: RIGHT

## 2023-08-10 ASSESSMENT — PAIN DESCRIPTION - LOCATION: LOCATION: LEG

## 2023-08-10 ASSESSMENT — PAIN SCALES - GENERAL: PAINLEVEL_OUTOF10: 8

## 2023-08-10 ASSESSMENT — PAIN DESCRIPTION - DESCRIPTORS: DESCRIPTORS: ACHING

## 2023-08-10 NOTE — PROGRESS NOTES
hours) at 8/10/2023 0828  Last data filed at 8/10/2023 8460  Gross per 24 hour   Intake 1612.72 ml   Output 3200 ml   Net -1587.28 ml       Labs:  Hematology:  Recent Labs     08/08/23 0212   WBC 12.3*   RBC 3.56*   HGB 11.0*   HCT 33.7*   MCV 94.7   MCH 30.9   MCHC 32.6   RDW 12.6      MPV 9.8     Chemistry:  Recent Labs     08/08/23 0212 08/08/23  0727 08/09/23 0215 08/09/23  0811 08/09/23  1343 08/09/23  2124 08/10/23  0205      < > 138   < > 139 138 138   K 4.1   < > 4.0   < > 4.3 4.4 4.2      < > 107   < > 106 104 105   CO2 28   < > 25   < > 22 23 24   GLUCOSE 85  --  108*  --   --   --   --    BUN 37*  --  27*  --   --   --   --    CREATININE 2.1*  --  1.6*  --   --   --   --    ANIONGAP 7*   < > 6*   < > 11 11 9   LABGLOM 23*  --  32*  --   --   --   --    CALCIUM 8.4*  --  8.7  --   --   --   --     < > = values in this interval not displayed. Recent Labs     08/08/23 0212 08/09/23 0215   PROT 5.4* 5.2*   LABALBU 3.0* 2.9*   AST 18 22   ALT 12 15   ALKPHOS 96 87   BILITOT 0.3 0.2*     ABG:No results found for: POCPH, PHART, PH, POCPCO2, QTK1AQY, PCO2, POCPO2, PO2ART, PO2, POCHCO3, OGB5RMX, HCO3, NBEA, PBEA, BEART, BE, THGBART, THB, GQX3AOM, FMOD0IPT, D8MUEHOL, O2SAT, FIO2  No results found for: SPECIAL  Lab Results   Component Value Date/Time    CULTURE  08/06/2023 05:06 PM     YEAST NOT CANDIDA ALBICANS OR PAMELA DUBLINIENSIS 10 to 50,000 CFU/ML       Radiology:  CT HEAD WO CONTRAST    Result Date: 8/6/2023  Mild central and cortical cerebral atrophy. Encephalomalacia in the left occipital parietal lobe. Bilateral lacunar infarcts in the basal ganglia most likely chronic. Mild to moderate chronic deep white matter ischemic changes No acute intracranial abnormalities are noted. Focal hyperostosis along the outer table of the right parietal bone. Please correlate clinically     US RENAL COMPLETE    Result Date: 8/7/2023  Unremarkable renal ultrasound. No hydronephrosis.      XR

## 2023-08-10 NOTE — CARE COORDINATION
Transitional Planning    Left message for admissions at [facility] with Neyda at [facility]- they can accept and will start precert. Updated son/guardian and he is agreeable     [phone] with [place]- insurance approved. Transport ETA 1300 8/11. No transport this evening.        Will Need DNR-CCA physical copy signed when [facility] physicians rounds 8/11, copy placed in chart

## 2023-08-10 NOTE — PROGRESS NOTES
Occupational Therapy  Facility/Department: [facility] [address] [facility]  Occupational Therapy Treatment Note    Name: Stacy Khan  : 1945  MRN: [id number]  Date of Service: 8/10/2023    Discharge Recommendations:  Patient would benefit from continued therapy after discharge  OT Equipment Recommendations  Equipment Needed: No       Patient Diagnosis(es): The primary encounter diagnosis was Hyperglycemia due to diabetes mellitus ([address]). Diagnoses of Hyperkalemia and Stage 4 chronic kidney disease ([address]) were also pertinent to this visit. Past Medical History:  has a past medical history of Anxiety, Cervicalgia, Diabetes mellitus ([address]), Dorsalgia, Hemiplegia ([address]), Hypertension, Osteoporosis, and Stroke (cerebrum) ([address]). Past Surgical History:  has a past surgical history that includes back surgery and knee surgery. Assessment   Performance deficits / Impairments: Decreased functional mobility ; Decreased safe awareness;Decreased ADL status; Decreased balance;Decreased endurance;Decreased strength;Decreased ROM  Assessment: pt seen for OT treatment, Required moderate encouragement, very limited verbal interaction with writer, t/f min  assist from bed , bed mobility mod assist,feeding and grooming min assist pt found in bed . Pt into chair at end of session. Pt currently unsafe to reurn to prior living environment and would benefit from continued OT services here and post D/C.   Prognosis: Fair  REQUIRES OT FOLLOW-UP: Yes  Activity Tolerance  Activity Tolerance: Patient limited by fatigue        Plan   Occupational Therapy Plan  Times Per Week: 5-6x/wk  Current Treatment Recommendations: Strengthening, ROM, Balance training, Functional mobility training, Endurance training, Safety education & training, Equipment evaluation, education, & procurement, Patient/Caregiver education & training, Self-Care / ADL, Modalities, Coordination training, Positioning

## 2023-08-10 NOTE — PLAN OF CARE
Problem: Discharge Planning  Goal: Discharge to home or other facility with appropriate resources  Outcome: Progressing  Flowsheets (Taken 8/10/2023 0810)  Discharge to home or other facility with appropriate resources: Identify barriers to discharge with patient and caregiver     Problem: Pain  Goal: Verbalizes/displays adequate comfort level or baseline comfort level  Outcome: Progressing  Flowsheets (Taken 8/10/2023 0810)  Verbalizes/displays adequate comfort level or baseline comfort level: Assess pain using appropriate pain scale     Problem: Safety - Adult  Goal: Free from fall injury  Outcome: Progressing     Problem: ABCDS Injury Assessment  Goal: Absence of physical injury  Outcome: Progressing     Problem: Chronic Conditions and Co-morbidities  Goal: Patient's chronic conditions and co-morbidity symptoms are monitored and maintained or improved  Outcome: Progressing  Flowsheets (Taken 8/10/2023 0810)  Care Plan - Patient's Chronic Conditions and Co-Morbidity Symptoms are Monitored and Maintained or Improved: Monitor and assess patient's chronic conditions and comorbid symptoms for stability, deterioration, or improvement   Patient is progressing towards goals

## 2023-08-11 VITALS
RESPIRATION RATE: 22 BRPM | SYSTOLIC BLOOD PRESSURE: 134 MMHG | TEMPERATURE: 98.2 F | DIASTOLIC BLOOD PRESSURE: 70 MMHG | HEART RATE: 101 BPM | HEIGHT: 57 IN | BODY MASS INDEX: 27.59 KG/M2 | WEIGHT: 127.87 LBS | OXYGEN SATURATION: 100 %

## 2023-08-11 LAB
GLUCOSE BLD-MCNC: 100 MG/DL (ref 65–105)
GLUCOSE BLD-MCNC: 259 MG/DL (ref 65–105)
GLUCOSE BLD-MCNC: 362 MG/DL (ref 65–105)

## 2023-08-11 PROCEDURE — 6370000000 HC RX 637 (ALT 250 FOR IP): Performed by: FAMILY MEDICINE

## 2023-08-11 PROCEDURE — 2580000003 HC RX 258: Performed by: FAMILY MEDICINE

## 2023-08-11 PROCEDURE — 6370000000 HC RX 637 (ALT 250 FOR IP): Performed by: STUDENT IN AN ORGANIZED HEALTH CARE EDUCATION/TRAINING PROGRAM

## 2023-08-11 PROCEDURE — 82947 ASSAY GLUCOSE BLOOD QUANT: CPT

## 2023-08-11 PROCEDURE — 6360000002 HC RX W HCPCS: Performed by: FAMILY MEDICINE

## 2023-08-11 RX ORDER — INSULIN LISPRO 100 [IU]/ML
6 INJECTION, SOLUTION INTRAVENOUS; SUBCUTANEOUS ONCE
Status: COMPLETED | OUTPATIENT
Start: 2023-08-11 | End: 2023-08-11

## 2023-08-11 RX ADMIN — INSULIN GLARGINE 35 UNITS: 100 INJECTION, SOLUTION SUBCUTANEOUS at 09:09

## 2023-08-11 RX ADMIN — AMLODIPINE BESYLATE 5 MG: 5 TABLET ORAL at 09:10

## 2023-08-11 RX ADMIN — HEPARIN SODIUM 5000 UNITS: 5000 INJECTION INTRAVENOUS; SUBCUTANEOUS at 06:08

## 2023-08-11 RX ADMIN — INSULIN LISPRO 6 UNITS: 100 INJECTION, SOLUTION INTRAVENOUS; SUBCUTANEOUS at 12:28

## 2023-08-11 RX ADMIN — CEPHALEXIN 500 MG: 250 CAPSULE ORAL at 09:10

## 2023-08-11 RX ADMIN — SODIUM CHLORIDE, PRESERVATIVE FREE 10 ML: 5 INJECTION INTRAVENOUS at 09:10

## 2023-08-11 RX ADMIN — INSULIN LISPRO 4 UNITS: 100 INJECTION, SOLUTION INTRAVENOUS; SUBCUTANEOUS at 12:27

## 2023-08-11 NOTE — PLAN OF CARE
Problem: Discharge Planning  Goal: Discharge to home or other facility with appropriate resources  Outcome: Adequate for Discharge  Flowsheets (Taken 8/11/2023 0820)  Discharge to home or other facility with appropriate resources: Identify barriers to discharge with patient and caregiver     Problem: Pain  Goal: Verbalizes/displays adequate comfort level or baseline comfort level  Outcome: Adequate for Discharge  Flowsheets (Taken 8/11/2023 0819)  Verbalizes/displays adequate comfort level or baseline comfort level: Assess pain using appropriate pain scale     Problem: Safety - Adult  Goal: Free from fall injury  Outcome: Adequate for Discharge     Problem: ABCDS Injury Assessment  Goal: Absence of physical injury  Outcome: Adequate for Discharge     Problem: Chronic Conditions and Co-morbidities  Goal: Patient's chronic conditions and co-morbidity symptoms are monitored and maintained or improved  Outcome: Adequate for Discharge  Flowsheets (Taken 8/11/2023 0820)  Care Plan - Patient's Chronic Conditions and Co-Morbidity Symptoms are Monitored and Maintained or Improved: Monitor and assess patient's chronic conditions and comorbid symptoms for stability, deterioration, or improvement     Problem: Skin/Tissue Integrity  Goal: Absence of new skin breakdown  Description: 1. Monitor for areas of redness and/or skin breakdown  2. Assess vascular access sites hourly  3. Every 4-6 hours minimum:  Change oxygen saturation probe site  4. Every 4-6 hours:  If on nasal continuous positive airway pressure, respiratory therapy assess nares and determine need for appliance change or resting period.   Outcome: Adequate for Discharge   Patient meets criteria for discharge

## 2023-08-11 NOTE — PROGRESS NOTES
Transport arrived at [facility]. Report given to Darline Shaw, transport staff. Patient blanket sent with patient and pt son notified of transport.

## 2023-08-11 NOTE — CARE COORDINATION
Transitional Planning    Left message with Neyda at [facility] confirming transport time of 1300. Faxed AVS to facility. # for Report [phone]          Discharge [address] [facility] [address]  Clinical Case Management Department  Written by:  Sarah Alonzo RN    Patient Name: Annalise Garber  Attending Provider: Taty Blackmon MD  Admit Date: 2023  1:42 PM  MRN: [id number]  Account: [de-identified]                     : 1945  Discharge Date: 23       Disposition: SNF    Sarah Alonzo RN

## 2023-08-26 ENCOUNTER — HOSPITAL ENCOUNTER (EMERGENCY)
Age: 78
Discharge: HOME | End: 2023-08-26
Attending: EMERGENCY MEDICINE
Payer: MEDICARE

## 2023-08-26 VITALS
BODY MASS INDEX: 25.67 KG/M2 | DIASTOLIC BLOOD PRESSURE: 89 MMHG | HEART RATE: 97 BPM | OXYGEN SATURATION: 96 % | HEIGHT: 57 IN | TEMPERATURE: 98.4 F | SYSTOLIC BLOOD PRESSURE: 149 MMHG | RESPIRATION RATE: 16 BRPM | WEIGHT: 119 LBS

## 2023-08-26 DIAGNOSIS — N30.00 ACUTE CYSTITIS WITHOUT HEMATURIA: Primary | ICD-10-CM

## 2023-08-26 DIAGNOSIS — R33.9 URINARY RETENTION: ICD-10-CM

## 2023-08-26 LAB
ANION GAP SERPL CALCULATED.3IONS-SCNC: 13 MMOL/L (ref 9–17)
BACTERIA URNS QL MICRO: ABNORMAL
BILIRUB UR QL STRIP: NEGATIVE
BUN SERPL-MCNC: 23 MG/DL (ref 8–23)
CALCIUM SERPL-MCNC: 9.7 MG/DL (ref 8.6–10.4)
CHARACTER UR: ABNORMAL
CHLORIDE SERPL-SCNC: 102 MMOL/L (ref 98–107)
CLARITY UR: ABNORMAL
CO2 SERPL-SCNC: 20 MMOL/L (ref 20–31)
COLOR UR: YELLOW
CREAT SERPL-MCNC: 1.6 MG/DL (ref 0.5–0.9)
EPI CELLS #/AREA URNS HPF: ABNORMAL /HPF (ref 0–5)
GFR SERPL CREATININE-BSD FRML MDRD: 32 ML/MIN/1.73M2
GLUCOSE BLD-MCNC: 291 MG/DL (ref 65–105)
GLUCOSE SERPL-MCNC: 396 MG/DL (ref 70–99)
GLUCOSE UR STRIP-MCNC: ABNORMAL MG/DL
HGB UR QL STRIP.AUTO: ABNORMAL
KETONES UR STRIP-MCNC: NEGATIVE MG/DL
LEUKOCYTE ESTERASE UR QL STRIP: ABNORMAL
NITRITE UR QL STRIP: NEGATIVE
PH UR STRIP: 6 [PH] (ref 5–8)
POTASSIUM SERPL-SCNC: 4.5 MMOL/L (ref 3.7–5.3)
PROT UR STRIP-MCNC: ABNORMAL MG/DL
RBC #/AREA URNS HPF: ABNORMAL /HPF (ref 0–2)
SODIUM SERPL-SCNC: 135 MMOL/L (ref 135–144)
SP GR UR STRIP: 1.02 (ref 1–1.03)
UROBILINOGEN UR STRIP-ACNC: NORMAL EU/DL (ref 0–1)
WBC #/AREA URNS HPF: ABNORMAL /HPF (ref 0–5)
YEAST URNS QL MICRO: ABNORMAL

## 2023-08-26 PROCEDURE — 99284 EMERGENCY DEPT VISIT MOD MDM: CPT

## 2023-08-26 PROCEDURE — 6370000000 HC RX 637 (ALT 250 FOR IP): Performed by: EMERGENCY MEDICINE

## 2023-08-26 PROCEDURE — 51702 INSERT TEMP BLADDER CATH: CPT

## 2023-08-26 PROCEDURE — 82947 ASSAY GLUCOSE BLOOD QUANT: CPT

## 2023-08-26 PROCEDURE — 81001 URINALYSIS AUTO W/SCOPE: CPT

## 2023-08-26 PROCEDURE — 87086 URINE CULTURE/COLONY COUNT: CPT

## 2023-08-26 PROCEDURE — 87106 FUNGI IDENTIFICATION YEAST: CPT

## 2023-08-26 PROCEDURE — 36415 COLL VENOUS BLD VENIPUNCTURE: CPT

## 2023-08-26 PROCEDURE — 96372 THER/PROPH/DIAG INJ SC/IM: CPT

## 2023-08-26 PROCEDURE — 80048 BASIC METABOLIC PNL TOTAL CA: CPT

## 2023-08-26 RX ORDER — FLUCONAZOLE 100 MG/1
150 TABLET ORAL ONCE
Status: COMPLETED | OUTPATIENT
Start: 2023-08-26 | End: 2023-08-26

## 2023-08-26 RX ORDER — CIPROFLOXACIN 250 MG/1
500 TABLET, FILM COATED ORAL ONCE
Status: COMPLETED | OUTPATIENT
Start: 2023-08-26 | End: 2023-08-26

## 2023-08-26 RX ORDER — CIPROFLOXACIN 500 MG/1
500 TABLET, FILM COATED ORAL 2 TIMES DAILY
Qty: 14 TABLET | Refills: 0 | Status: SHIPPED | OUTPATIENT
Start: 2023-08-26 | End: 2023-09-02

## 2023-08-26 RX ADMIN — CIPROFLOXACIN 500 MG: 250 TABLET, FILM COATED ORAL at 05:45

## 2023-08-26 RX ADMIN — FLUCONAZOLE 150 MG: 100 TABLET ORAL at 05:45

## 2023-08-26 RX ADMIN — INSULIN HUMAN 10 UNITS: 100 INJECTION, SOLUTION PARENTERAL at 06:06

## 2023-08-26 ASSESSMENT — PAIN - FUNCTIONAL ASSESSMENT
PAIN_FUNCTIONAL_ASSESSMENT: 0-10
PAIN_FUNCTIONAL_ASSESSMENT: NONE - DENIES PAIN

## 2023-08-26 ASSESSMENT — PAIN SCALES - GENERAL: PAINLEVEL_OUTOF10: 9

## 2023-08-26 NOTE — DISCHARGE INSTRUCTIONS
Take the antibiotic until it is gone. Call your family doctor on Monday and also urology. Return if worse. Drink 10 glasses of water daily.

## 2023-08-27 LAB
MICROORGANISM SPEC CULT: NORMAL
SPECIMEN DESCRIPTION: NORMAL

## 2023-08-28 ENCOUNTER — TELEPHONE (OUTPATIENT)
Age: 78
End: 2023-08-28

## 2023-08-28 LAB
MICROORGANISM SPEC CULT: NORMAL
SPECIMEN DESCRIPTION: NORMAL

## 2023-08-28 NOTE — TELEPHONE ENCOUNTER
Pt's son Nori Boswell Patient went to ER, she had a plugged Cath. Pt is on antibiotic and diflucan. Nori Boswell stated that PT is doing well. Pt has a Hosp. F/U 9/11.  FYI

## 2023-09-02 VITALS
RESPIRATION RATE: 16 BRPM | HEIGHT: 58 IN | DIASTOLIC BLOOD PRESSURE: 80 MMHG | HEART RATE: 98 BPM | BODY MASS INDEX: 25.73 KG/M2 | SYSTOLIC BLOOD PRESSURE: 130 MMHG | WEIGHT: 122.6 LBS

## 2023-09-02 RX ORDER — ASPIRIN 81 MG/1
81 TABLET ORAL DAILY
COMMUNITY

## 2023-09-02 RX ORDER — CHLORAL HYDRATE 500 MG
1000 CAPSULE ORAL DAILY
COMMUNITY

## 2023-09-02 RX ORDER — LORATADINE 10 MG/1
10 TABLET ORAL DAILY
COMMUNITY

## 2023-09-02 RX ORDER — INSULIN GLARGINE 100 [IU]/ML
INJECTION, SOLUTION SUBCUTANEOUS
COMMUNITY
Start: 2023-08-25

## 2023-09-02 RX ORDER — INSULIN LISPRO 100 [IU]/ML
INJECTION, SOLUTION INTRAVENOUS; SUBCUTANEOUS
COMMUNITY
Start: 2023-08-25

## 2023-09-02 RX ORDER — LISINOPRIL 20 MG/1
20 TABLET ORAL DAILY
COMMUNITY

## 2023-09-02 RX ORDER — ZINC GLUCONATE 50 MG
50 TABLET ORAL DAILY
COMMUNITY

## 2023-09-02 RX ORDER — DULAGLUTIDE 0.75 MG/.5ML
0.75 INJECTION, SOLUTION SUBCUTANEOUS WEEKLY
COMMUNITY

## 2023-09-02 RX ORDER — CEPHALEXIN 500 MG/1
500 CAPSULE ORAL 3 TIMES DAILY
COMMUNITY
Start: 2023-08-14

## 2023-09-02 RX ORDER — CALCIUM CARBONATE 500(1250)
500 TABLET ORAL DAILY
COMMUNITY

## 2023-09-02 RX ORDER — SEMAGLUTIDE 1.34 MG/ML
INJECTION, SOLUTION SUBCUTANEOUS
COMMUNITY

## 2023-09-02 RX ORDER — MULTIVIT WITH MINERALS/LUTEIN
1000 TABLET ORAL DAILY
COMMUNITY

## 2023-09-09 DIAGNOSIS — M54.2 CERVICALGIA: ICD-10-CM

## 2023-09-09 DIAGNOSIS — E78.2 MIXED HYPERLIPIDEMIA: ICD-10-CM

## 2023-09-09 RX ORDER — LANCETS 33 GAUGE
1 EACH MISCELLANEOUS 4 TIMES DAILY
COMMUNITY

## 2023-09-09 RX ORDER — PEN NEEDLE, DIABETIC 30 GX3/16"
1 NEEDLE, DISPOSABLE MISCELLANEOUS DAILY
COMMUNITY

## 2023-09-09 RX ORDER — ACETAMINOPHEN 160 MG
1 TABLET,DISINTEGRATING ORAL DAILY
COMMUNITY

## 2023-09-11 ENCOUNTER — OFFICE VISIT (OUTPATIENT)
Age: 78
End: 2023-09-11

## 2023-09-11 VITALS
HEART RATE: 93 BPM | BODY MASS INDEX: 26.66 KG/M2 | TEMPERATURE: 97.7 F | HEIGHT: 57 IN | OXYGEN SATURATION: 97 % | RESPIRATION RATE: 14 BRPM | WEIGHT: 123.6 LBS | DIASTOLIC BLOOD PRESSURE: 80 MMHG | SYSTOLIC BLOOD PRESSURE: 124 MMHG

## 2023-09-11 DIAGNOSIS — N39.0 URINARY TRACT INFECTION ASSOCIATED WITH CATHETERIZATION OF URINARY TRACT, UNSPECIFIED INDWELLING URINARY CATHETER TYPE, SUBSEQUENT ENCOUNTER: Primary | ICD-10-CM

## 2023-09-11 DIAGNOSIS — Z79.4 INSULIN DEPENDENT TYPE 2 DIABETES MELLITUS (HCC): ICD-10-CM

## 2023-09-11 DIAGNOSIS — E11.65 HYPERGLYCEMIA DUE TO DIABETES MELLITUS (HCC): ICD-10-CM

## 2023-09-11 DIAGNOSIS — E11.9 INSULIN DEPENDENT TYPE 2 DIABETES MELLITUS (HCC): ICD-10-CM

## 2023-09-11 DIAGNOSIS — R33.9 URINARY RETENTION WITH INCOMPLETE BLADDER EMPTYING: ICD-10-CM

## 2023-09-11 DIAGNOSIS — T83.511D URINARY TRACT INFECTION ASSOCIATED WITH CATHETERIZATION OF URINARY TRACT, UNSPECIFIED INDWELLING URINARY CATHETER TYPE, SUBSEQUENT ENCOUNTER: Primary | ICD-10-CM

## 2023-09-11 SDOH — ECONOMIC STABILITY: HOUSING INSECURITY
IN THE LAST 12 MONTHS, WAS THERE A TIME WHEN YOU DID NOT HAVE A STEADY PLACE TO SLEEP OR SLEPT IN A SHELTER (INCLUDING NOW)?: NO

## 2023-09-11 SDOH — ECONOMIC STABILITY: FOOD INSECURITY: WITHIN THE PAST 12 MONTHS, THE FOOD YOU BOUGHT JUST DIDN'T LAST AND YOU DIDN'T HAVE MONEY TO GET MORE.: NEVER TRUE

## 2023-09-11 SDOH — ECONOMIC STABILITY: FOOD INSECURITY: WITHIN THE PAST 12 MONTHS, YOU WORRIED THAT YOUR FOOD WOULD RUN OUT BEFORE YOU GOT MONEY TO BUY MORE.: NEVER TRUE

## 2023-09-11 SDOH — ECONOMIC STABILITY: INCOME INSECURITY: HOW HARD IS IT FOR YOU TO PAY FOR THE VERY BASICS LIKE FOOD, HOUSING, MEDICAL CARE, AND HEATING?: NOT HARD AT ALL

## 2023-09-11 ASSESSMENT — PATIENT HEALTH QUESTIONNAIRE - PHQ9
SUM OF ALL RESPONSES TO PHQ QUESTIONS 1-9: 0
2. FEELING DOWN, DEPRESSED OR HOPELESS: 0
SUM OF ALL RESPONSES TO PHQ9 QUESTIONS 1 & 2: 0
SUM OF ALL RESPONSES TO PHQ QUESTIONS 1-9: 0
SUM OF ALL RESPONSES TO PHQ QUESTIONS 1-9: 0
1. LITTLE INTEREST OR PLEASURE IN DOING THINGS: 0
SUM OF ALL RESPONSES TO PHQ QUESTIONS 1-9: 0

## 2023-09-11 ASSESSMENT — ENCOUNTER SYMPTOMS
SHORTNESS OF BREATH: 0
CHEST TIGHTNESS: 0

## 2023-09-11 NOTE — PROGRESS NOTES
[facility]      Date of Visit:  2023  Patient Name: Sandra Velasquez   Patient :  1945     CHIEF COMPLAINT/HPI:     Sandra Velasquez is a 78 y.o. female who presents today for an general visit to be evaluated for the following condition(s):  Chief Complaint   Patient presents with    Follow-Up from Hospital     Patient is here for hospital follow. She was in the hospital for a week then went into the nursing home. She did end up back in the ER due to UTI and yeast infection from galeano cath that was still in. The cath was clogged causing her bladder to back up. Cath removed . No issues since cath was removed. Patient doing very well since being back home. Patient was in [facility] for 3 weeks. Sugars have been elevated but has had multiple UTIs. Sugar was 189. Patient did see Dr. Rk Fuentes on . Galeano was removed. REVIEW OF SYSTEM      Review of Systems   Respiratory:  Negative for chest tightness and shortness of breath. Cardiovascular:  Negative for chest pain.          REVIEWED INFORMATION      Allergies   Allergen Reactions    Aricept [Donepezil Hcl] Diarrhea     Diarrhea    Namenda [Memantine Hcl] Other (See Comments)     Encephalopathy       Current Outpatient Medications   Medication Sig Dispense Refill    Continuous Blood Gluc Sensor (FREESTYLE TERRY 2 SENSOR) MISC by Does not apply route      Cholecalciferol (VITAMIN D3) 50 MCG ( UT) CAPS Take 1 capsule by mouth daily      HUMALOG KWIKPEN 100 UNIT/ML SOPN       LANTUS SOLOSTAR 100 UNIT/ML injection pen nightly 35 units daily      aspirin 81 MG EC tablet Take 1 tablet by mouth daily      amLODIPine (NORVASC) 5 MG tablet Take 1 tablet by mouth daily 30 tablet 3    Multiple Vitamin (MULTI VITAMIN DAILY PO) Take by mouth      Insulin Pen Needle (PEN NEEDLES) 32G X 4 MM MISC 1 Needle by Does not apply route daily (Patient not taking: Reported on 2023)      blood glucose test strips (ASCENSIA AUTODISC

## 2023-09-12 DIAGNOSIS — G89.4 CHRONIC PAIN SYNDROME: Primary | ICD-10-CM

## 2023-09-12 RX ORDER — TRAMADOL HYDROCHLORIDE 50 MG/1
50 TABLET ORAL EVERY 6 HOURS PRN
Qty: 120 TABLET | Refills: 0 | Status: SHIPPED | OUTPATIENT
Start: 2023-09-12 | End: 2023-10-12

## 2023-09-12 RX ORDER — TRAMADOL HYDROCHLORIDE 50 MG/1
TABLET ORAL
Qty: 120 TABLET | OUTPATIENT
Start: 2023-09-12

## 2023-09-12 NOTE — TELEPHONE ENCOUNTER
Hreminio Vázquez is calling to request a refill on the following medication(s):    Medication Request:  Requested Prescriptions     Pending Prescriptions Disp Refills    traMADol (ULTRAM) 50 MG tablet 120 tablet 0     Sig: Take 1 tablet by mouth every 6 hours as needed for Pain for up to 30 days.  Max Daily Amount: 200 mg       Last Visit Date (If Applicable):  5/02/6533    Next Visit Date:    10/16/2023

## 2023-09-12 NOTE — TELEPHONE ENCOUNTER
Pt needs refills: 1. Lantus 40units @ night, 2. Amlodipine 5mg, 1 daily, Tramadol 50mg, 3 to 4 daily. Yaya Baca.  No need to call

## 2023-09-28 RX ORDER — LISINOPRIL 20 MG/1
20 TABLET ORAL DAILY
Qty: 90 TABLET | Refills: 3 | Status: SHIPPED | OUTPATIENT
Start: 2023-09-28

## 2023-09-28 NOTE — TELEPHONE ENCOUNTER
Karon Baker is calling to request a refill on the following medication(s):    Medication Request:  Requested Prescriptions     Pending Prescriptions Disp Refills    lisinopril (PRINIVIL;ZESTRIL) 20 MG tablet [Pharmacy Med Name: LISINOPRIL 20 MG TABLET] 90 tablet      Sig: TAKE ONE TABLET BY MOUTH DAILY       Last Visit Date (If Applicable):  4/94/2577    Next Visit Date:    10/16/2023

## 2023-10-08 DIAGNOSIS — G89.4 CHRONIC PAIN SYNDROME: ICD-10-CM

## 2023-10-09 RX ORDER — TRAMADOL HYDROCHLORIDE 50 MG/1
TABLET ORAL
Qty: 120 TABLET | Refills: 1 | Status: SHIPPED | OUTPATIENT
Start: 2023-10-09 | End: 2023-11-08

## 2023-10-09 NOTE — TELEPHONE ENCOUNTER
Cuca Perez is calling to request a refill on the following medication(s):    Medication Request:  Requested Prescriptions     Pending Prescriptions Disp Refills    traMADol (ULTRAM) 50 MG tablet [Pharmacy Med Name: TRAMADOL HCL 50 MG TABLET] 120 tablet      Sig: take 1 tablet by mouth every 6 hours if needed ( maximum daily dose of 4 )       Last Visit Date (If Applicable):  6/23/7511    Next Visit Date:    10/16/2023

## 2023-10-16 ENCOUNTER — OFFICE VISIT (OUTPATIENT)
Age: 78
End: 2023-10-16
Payer: MEDICARE

## 2023-10-16 VITALS
HEIGHT: 57 IN | DIASTOLIC BLOOD PRESSURE: 80 MMHG | BODY MASS INDEX: 27.49 KG/M2 | SYSTOLIC BLOOD PRESSURE: 126 MMHG | HEART RATE: 72 BPM | TEMPERATURE: 97.8 F | WEIGHT: 127.4 LBS | RESPIRATION RATE: 13 BRPM

## 2023-10-16 DIAGNOSIS — I10 ESSENTIAL HYPERTENSION: ICD-10-CM

## 2023-10-16 DIAGNOSIS — I69.354 HEMIPARESIS AFFECTING LEFT SIDE AS LATE EFFECT OF CEREBROVASCULAR ACCIDENT (CVA) (HCC): ICD-10-CM

## 2023-10-16 DIAGNOSIS — E11.65 HYPERGLYCEMIA DUE TO DIABETES MELLITUS (HCC): Primary | ICD-10-CM

## 2023-10-16 DIAGNOSIS — E78.2 MIXED HYPERLIPIDEMIA: ICD-10-CM

## 2023-10-16 PROCEDURE — G8400 PT W/DXA NO RESULTS DOC: HCPCS | Performed by: FAMILY MEDICINE

## 2023-10-16 PROCEDURE — 3079F DIAST BP 80-89 MM HG: CPT | Performed by: FAMILY MEDICINE

## 2023-10-16 PROCEDURE — G8484 FLU IMMUNIZE NO ADMIN: HCPCS | Performed by: FAMILY MEDICINE

## 2023-10-16 PROCEDURE — 3074F SYST BP LT 130 MM HG: CPT | Performed by: FAMILY MEDICINE

## 2023-10-16 PROCEDURE — 1036F TOBACCO NON-USER: CPT | Performed by: FAMILY MEDICINE

## 2023-10-16 PROCEDURE — 99214 OFFICE O/P EST MOD 30 MIN: CPT | Performed by: FAMILY MEDICINE

## 2023-10-16 PROCEDURE — G8419 CALC BMI OUT NRM PARAM NOF/U: HCPCS | Performed by: FAMILY MEDICINE

## 2023-10-16 PROCEDURE — G8427 DOCREV CUR MEDS BY ELIG CLIN: HCPCS | Performed by: FAMILY MEDICINE

## 2023-10-16 PROCEDURE — 1090F PRES/ABSN URINE INCON ASSESS: CPT | Performed by: FAMILY MEDICINE

## 2023-10-16 PROCEDURE — 1123F ACP DISCUSS/DSCN MKR DOCD: CPT | Performed by: FAMILY MEDICINE

## 2023-10-16 RX ORDER — AMLODIPINE BESYLATE 5 MG/1
5 TABLET ORAL DAILY
Qty: 90 TABLET | Refills: 1 | Status: SHIPPED | OUTPATIENT
Start: 2023-10-16

## 2023-10-16 RX ORDER — INSULIN GLARGINE 100 [IU]/ML
40 INJECTION, SOLUTION SUBCUTANEOUS NIGHTLY
Qty: 6 ML | Refills: 5 | Status: SHIPPED | OUTPATIENT
Start: 2023-10-16

## 2023-10-16 RX ORDER — PEN NEEDLE, DIABETIC 30 GX3/16"
1 NEEDLE, DISPOSABLE MISCELLANEOUS DAILY
Qty: 100 EACH | Refills: 3 | Status: SHIPPED | OUTPATIENT
Start: 2023-10-16

## 2023-10-16 RX ORDER — INSULIN LISPRO 100 [IU]/ML
10 INJECTION, SOLUTION INTRAVENOUS; SUBCUTANEOUS
Qty: 3 ADJUSTABLE DOSE PRE-FILLED PEN SYRINGE | Refills: 3 | Status: SHIPPED | OUTPATIENT
Start: 2023-10-16

## 2023-10-16 ASSESSMENT — ENCOUNTER SYMPTOMS
SHORTNESS OF BREATH: 0
CHEST TIGHTNESS: 0

## 2023-10-16 NOTE — PROGRESS NOTES
[facility] Hedy Rodarte      Date of Visit:  10/16/2023  Patient Name: Maru Tyson   Patient :  1945     CHIEF COMPLAINT/HPI:     Maru Tyson is a 78 y.o. female who presents today for an general visit to be evaluated for the following condition(s):  Chief Complaint   Patient presents with    Check-Up     6 months     Medication Refill     Needs refills on her insulin, and needles. Patient feeling well. Son was sick for 8 straight days. She was not affected. Sugars have been controlled per CGM average 90 day sugars around 160. BP is controlled. REVIEW OF SYSTEM      Review of Systems   Respiratory:  Negative for chest tightness and shortness of breath. Cardiovascular:  Negative for chest pain.          REVIEWED INFORMATION      Allergies   Allergen Reactions    Aricept [Donepezil Hcl] Diarrhea     Diarrhea    Namenda [Memantine Hcl] Other (See Comments)     Encephalopathy       Current Outpatient Medications   Medication Sig Dispense Refill    CRANBERRY PO Take by mouth      HUMALOG KWIKPEN 100 UNIT/ML SOPN Inject 10 Units into the skin 3 times daily (before meals) 3 Adjustable Dose Pre-filled Pen Syringe 3    LANTUS SOLOSTAR 100 UNIT/ML injection pen Inject 40 Units into the skin nightly 35 units daily 6 mL 5    amLODIPine (NORVASC) 5 MG tablet Take 1 tablet by mouth daily 90 tablet 1    Continuous Blood Gluc Sensor (FREESTYLE TERRY 2 SENSOR) MISC 2 Units by Does not apply route every 14 days 6 each 3    Insulin Pen Needle (PEN NEEDLES) 32G X 4 MM MISC 1 Needle by Does not apply route daily 100 each 3    traMADol (ULTRAM) 50 MG tablet take 1 tablet by mouth every 6 hours if needed ( maximum daily dose of 4 ) 120 tablet 1    lisinopril (PRINIVIL;ZESTRIL) 20 MG tablet TAKE ONE TABLET BY MOUTH DAILY 90 tablet 3    Cholecalciferol (VITAMIN D3) 50 MCG ( UT) CAPS Take 1 capsule by mouth daily      aspirin 81 MG EC tablet Take 1 tablet by mouth daily      Multiple Vitamin (MULTI VITAMIN DAILY

## 2023-12-11 ENCOUNTER — TELEPHONE (OUTPATIENT)
Dept: PHARMACY | Facility: CLINIC | Age: 78
End: 2023-12-11

## 2023-12-11 DIAGNOSIS — G89.4 CHRONIC PAIN SYNDROME: ICD-10-CM

## 2023-12-11 NOTE — TELEPHONE ENCOUNTER
[facility] HEALTH CLINICAL PHARMACY: ADHERENCE REVIEW  Identified care gap per [employer]: fills at [place]: ACE/ARB adherence      ASSESSMENT    ACE/ARB ADHERENCE    Insurance Records claims through 2023 (Prior Year  [address] = not reported;  [place] [address] = 83%; Potential Fail Date: 23): Lisinopril 20mg last filled on 23 for 90 day supply. Next refill due: 23    Prescribed si tablet/capsule daily    Per Insurer Portal:Same as above. Per [facility]:  not contacted    BP Readings from Last 3 Encounters:   10/16/23 126/80   23 124/80   23 130/80     Estimated Creatinine Clearance: 22 mL/min (A) (based on SCr of 1.6 mg/dL (H)). Lab Results   Component Value Date    CREATININE 1.6 (H) 2023     Lab Results   Component Value Date    K 4.5 2023       PLAN  Per insurer report, LIS-0 - co-pays are based on tiers and patient is subject to coverage gap. The following are interventions that have been identified:   Discontinued by: Chelsie Moore MD on 2023     Reached Son for review. Patient no longer on. Monitors BP daily and patient hasn't been getting since D/C from hospital.    Will route to PharmD to update chart. Last Visit: 10/16/23  Next Visit: 3/19/24    Manjula Banegas CP.    [facility] free: [phone]

## 2023-12-11 NOTE — TELEPHONE ENCOUNTER
Dr. Steve Fernandez MD,      Outreach regarding medication adherence:  Patient's son reports patient has not taken lisinopril since it was discontinued upon discharge on 08/11/2023. Noted you sent refill of lisinopril 20mg daily to pharmacy on 09/28/2023. Should patient restart taking lisinopril? Last visit: 10/16/2023, Next visit: 03/19/2024    See encounter note(s) below for complete details. Please let me know if you have any questions.       Thank you,  Marci Mar, PharmD, BCACP, [address] [place] [address], toll free: [phone], option 1

## 2023-12-11 NOTE — TELEPHONE ENCOUNTER
Carissa Weeks is calling to request a refill on the following medication(s):    Medication Request:  Requested Prescriptions     Pending Prescriptions Disp Refills    traMADol (ULTRAM) 50 MG tablet [Pharmacy Med Name: TRAMADOL HCL 50 MG TABLET] 120 tablet      Sig: take 1 tablet by mouth every 6 hours if needed ( maximum daily dose of 4 )       Last Visit Date (If Applicable):  98/24/0059    Next Visit Date:    3/19/2024

## 2023-12-12 RX ORDER — TRAMADOL HYDROCHLORIDE 50 MG/1
50 TABLET ORAL EVERY 6 HOURS PRN
Qty: 120 TABLET | Refills: 1 | Status: SHIPPED | OUTPATIENT
Start: 2023-12-12 | End: 2024-02-10

## 2023-12-14 NOTE — TELEPHONE ENCOUNTER
[facility] CLINICAL PHARMACY REVIEW: ADHERENCE    Provider agrees patient should no longer be on lisinopril as son had already shared with [facility] team. Bea Hagen removed lisinopril from patient list.     E-Cancel Status: Request approved by pharmacy (2023  5:07 PM EST) - Will not call pharmacy requesting discontinuation of medication in their system as they received it electronically.      Marci Mar, PharmD, BCACP, [facility], toll free: [phone], option 1    =======================================================    For Pharmacy Admin Tracking Only  Program: Karine in place:  No  Recommendation Provided To: Provider: 1 via Note to Provider and Patient/Caregiver: 1 via Telephone  Intervention Detail: Adherence Monitorin and Discontinued Rx: 1, reason: Non-adherence  Intervention Accepted By: Provider: 1 and Patient/Caregiver: 0  Gap Closed?: No   Time Spent (min): 45

## 2024-01-12 RX ORDER — AMLODIPINE BESYLATE 5 MG/1
5 TABLET ORAL DAILY
Qty: 90 TABLET | Refills: 1 | Status: SHIPPED | OUTPATIENT
Start: 2024-01-12

## 2024-01-12 NOTE — TELEPHONE ENCOUNTER
Elham Amaod is calling to request a refill on the following medication(s):    Medication Request:  Requested Prescriptions     Pending Prescriptions Disp Refills    amLODIPine (NORVASC) 5 MG tablet [Pharmacy Med Name: amLODIPine BESYLATE 5 MG TAB] 90 tablet 1     Sig: TAKE ONE TABLET BY MOUTH DAILY       Last Visit Date (If Applicable):  10/16/2023    Next Visit Date:    3/19/2024

## 2024-02-12 DIAGNOSIS — G89.4 CHRONIC PAIN SYNDROME: ICD-10-CM

## 2024-02-12 RX ORDER — TRAMADOL HYDROCHLORIDE 50 MG/1
50 TABLET ORAL EVERY 6 HOURS PRN
Qty: 120 TABLET | Refills: 1 | Status: SHIPPED | OUTPATIENT
Start: 2024-02-12 | End: 2024-04-12

## 2024-02-12 NOTE — TELEPHONE ENCOUNTER
Elham Amado is calling to request a refill on the following medication(s):    Medication Request:  Requested Prescriptions     Pending Prescriptions Disp Refills    traMADol (ULTRAM) 50 MG tablet [Pharmacy Med Name: TRAMADOL HCL 50 MG TABLET] 120 tablet      Sig: Take 1 tablet by mouth every 6 hours as needed for Pain.       Last Visit Date (If Applicable):  10/16/2023    Next Visit Date:    3/19/2024

## 2024-03-19 ENCOUNTER — OFFICE VISIT (OUTPATIENT)
Age: 79
End: 2024-03-19

## 2024-03-19 VITALS
TEMPERATURE: 98.4 F | WEIGHT: 130.6 LBS | DIASTOLIC BLOOD PRESSURE: 60 MMHG | SYSTOLIC BLOOD PRESSURE: 120 MMHG | OXYGEN SATURATION: 99 % | HEART RATE: 94 BPM | BODY MASS INDEX: 28.26 KG/M2 | RESPIRATION RATE: 14 BRPM

## 2024-03-19 DIAGNOSIS — I69.354 HEMIPARESIS AFFECTING LEFT SIDE AS LATE EFFECT OF CEREBROVASCULAR ACCIDENT (CVA) (HCC): ICD-10-CM

## 2024-03-19 DIAGNOSIS — E11.65 HYPERGLYCEMIA DUE TO DIABETES MELLITUS (HCC): Primary | ICD-10-CM

## 2024-03-19 ASSESSMENT — PATIENT HEALTH QUESTIONNAIRE - PHQ9
SUM OF ALL RESPONSES TO PHQ QUESTIONS 1-9: 0
1. LITTLE INTEREST OR PLEASURE IN DOING THINGS: NOT AT ALL
SUM OF ALL RESPONSES TO PHQ QUESTIONS 1-9: 0
2. FEELING DOWN, DEPRESSED OR HOPELESS: NOT AT ALL
SUM OF ALL RESPONSES TO PHQ9 QUESTIONS 1 & 2: 0

## 2024-03-19 NOTE — PROGRESS NOTES
daily PRN      Lancets 33G MISC 1 Lancet  by Does not apply route in the morning, at noon, in the evening, and at bedtime 2-4 times daily PRN      Cholecalciferol (VITAMIN D3) 50 MCG (2000 UT) CAPS Take 1 capsule by mouth daily      Omega-3 Fatty Acids (FISH OIL) 1000 MG capsule Take 1 capsule by mouth daily      loratadine (CLARITIN) 10 MG tablet Take 1 tablet by mouth daily      calcium carbonate (OSCAL) 500 MG TABS tablet Take 1 tablet by mouth daily      aspirin 81 MG EC tablet Take 1 tablet by mouth daily      zinc gluconate 50 MG tablet Take 1 tablet by mouth daily      Ascorbic Acid (VITAMIN C) 1000 MG tablet Take 1 tablet by mouth daily      Dulaglutide (TRULICITY) 0.75 MG/0.5ML SOPN Inject 0.75 mg into the skin once a week      Semaglutide,0.25 or 0.5MG/DOS, (OZEMPIC, 0.25 OR 0.5 MG/DOSE,) 2 MG/1.5ML SOPN Inject 0.5 mg into the skin once a week      metFORMIN (GLUCOPHAGE) 1000 MG tablet Take 1 tablet by mouth 2 times daily (with meals)      insulin detemir (LEVEMIR) 100 UNIT/ML injection vial Inject 40 Units into the skin nightly 10 mL 3    Multiple Vitamin (MULTI VITAMIN DAILY PO) Take by mouth       No current facility-administered medications for this visit.        Patient Active Problem List   Diagnosis    Hyperglycemia due to diabetes mellitus (HCC)    Acute renal failure with tubular necrosis (HCC)    Stage 4 chronic kidney disease (HCC)    Essential hypertension    Urinary retention with incomplete bladder emptying    Hemiparesis affecting left side as late effect of cerebrovascular accident (CVA) (HCC)    Mixed hyperlipidemia    Cervicalgia       Past Medical History:   Diagnosis Date    Anxiety     Cervicalgia     Diabetes mellitus (HCC)     Dorsalgia     Hemiplegia (HCC)     Hypertension     Mixed hyperlipidemia     Osteoporosis     Stroke (cerebrum) (HCC) 1995       Past Surgical History:   Procedure Laterality Date    BACK SURGERY      disc surgery lower back    KNEE SURGERY      left knee/

## 2024-03-23 ASSESSMENT — ENCOUNTER SYMPTOMS
CHEST TIGHTNESS: 0
SHORTNESS OF BREATH: 0

## 2024-04-01 RX ORDER — AMLODIPINE BESYLATE 5 MG/1
5 TABLET ORAL DAILY
Qty: 90 TABLET | Refills: 1 | Status: SHIPPED | OUTPATIENT
Start: 2024-04-01

## 2024-04-01 NOTE — TELEPHONE ENCOUNTER
Elham Amado is calling to request a refill on the following medication(s):    Medication Request:  Requested Prescriptions     Pending Prescriptions Disp Refills    amLODIPine (NORVASC) 5 MG tablet [Pharmacy Med Name: AMLODIPINE BESYLATE 5 MG TAB] 90 tablet 1     Sig: take 1 tablet by mouth once daily       Last Visit Date (If Applicable):  3/19/2024    Next Visit Date:    9/17/2024

## 2024-04-11 DIAGNOSIS — G89.4 CHRONIC PAIN SYNDROME: ICD-10-CM

## 2024-04-11 NOTE — TELEPHONE ENCOUNTER
Elham Amado is calling to request a refill on the following medication(s):    Medication Request:  Requested Prescriptions     Pending Prescriptions Disp Refills    traMADol (ULTRAM) 50 MG tablet [Pharmacy Med Name: TRAMADOL HCL 50 MG TABLET] 120 tablet      Sig: take 1 tablet by mouth every 6 hours AS NEEDED FOR PAIN ( maximum daily dose of 4 )       Last Visit Date (If Applicable):  3/19/2024    Next Visit Date:    9/17/2024

## 2024-04-12 RX ORDER — TRAMADOL HYDROCHLORIDE 50 MG/1
50 TABLET ORAL EVERY 6 HOURS PRN
Qty: 120 TABLET | Refills: 1 | Status: SHIPPED | OUTPATIENT
Start: 2024-04-12 | End: 2024-06-11

## 2024-06-04 ENCOUNTER — TELEPHONE (OUTPATIENT)
Age: 79
End: 2024-06-04

## 2024-06-04 NOTE — TELEPHONE ENCOUNTER
Heladio/son called to request med renewal of Lantus.  He asked if she could get a 3 month supply instead of a 1 month. He said it is much cheaper/more cost effective to get in a 3 mo supply.  Pharmacy:  [facility] in [place]

## 2024-06-05 RX ORDER — INSULIN GLARGINE 100 [IU]/ML
INJECTION, SOLUTION SUBCUTANEOUS
Qty: 36 ML | Refills: 0 | Status: SHIPPED | OUTPATIENT
Start: 2024-06-05

## 2024-06-05 NOTE — TELEPHONE ENCOUNTER
Elham Amado is calling to request a refill on the following medication(s):    Medication Request:  Requested Prescriptions     Pending Prescriptions Disp Refills    LANTUS SOLOSTAR 100 UNIT/ML injection pen 36 mL 0     Sig: Inject 40 Units into the skin nightly 35 units daily       Last Visit Date (If Applicable):  3/19/2024    Next Visit Date:    9/17/2024

## 2024-06-05 NOTE — TELEPHONE ENCOUNTER
Elham Amado is calling to request a refill on the following medication(s):    Medication Request:  Requested Prescriptions     Pending Prescriptions Disp Refills    LANTUS SOLOSTAR 100 UNIT/ML injection pen 6 mL 5     Sig: Inject 40 Units into the skin nightly 35 units daily       Last Visit Date (If Applicable):  3/19/2024    Next Visit Date:    9/17/2024

## 2024-06-12 DIAGNOSIS — G89.4 CHRONIC PAIN SYNDROME: ICD-10-CM

## 2024-06-12 RX ORDER — TRAMADOL HYDROCHLORIDE 50 MG/1
50 TABLET ORAL EVERY 6 HOURS PRN
Qty: 120 TABLET | Refills: 0 | Status: SHIPPED | OUTPATIENT
Start: 2024-06-12 | End: 2024-07-12

## 2024-06-12 NOTE — TELEPHONE ENCOUNTER
Elham Amado is calling to request a refill on the following medication(s):    Medication Request:  Requested Prescriptions     Pending Prescriptions Disp Refills    traMADol (ULTRAM) 50 MG tablet [Pharmacy Med Name: TRAMADOL HCL 50 MG TABLET] 120 tablet      Sig: Take 1 tablet by mouth every 6 hours as needed for Pain.       Last Visit Date (If Applicable):  3/19/2024    Next Visit Date:    9/17/2024

## 2024-07-10 ENCOUNTER — TELEPHONE (OUTPATIENT)
Age: 79
End: 2024-07-10

## 2024-07-10 DIAGNOSIS — Z79.4 INSULIN DEPENDENT TYPE 2 DIABETES MELLITUS (HCC): Primary | ICD-10-CM

## 2024-07-10 DIAGNOSIS — G89.4 CHRONIC PAIN SYNDROME: ICD-10-CM

## 2024-07-10 DIAGNOSIS — E11.9 INSULIN DEPENDENT TYPE 2 DIABETES MELLITUS (HCC): Primary | ICD-10-CM

## 2024-07-10 RX ORDER — INSULIN LISPRO 100 [IU]/ML
10 INJECTION, SOLUTION INTRAVENOUS; SUBCUTANEOUS
Qty: 3 ADJUSTABLE DOSE PRE-FILLED PEN SYRINGE | Refills: 3 | Status: SHIPPED | OUTPATIENT
Start: 2024-07-10

## 2024-07-10 RX ORDER — TRAMADOL HYDROCHLORIDE 50 MG/1
50 TABLET ORAL EVERY 6 HOURS PRN
Qty: 120 TABLET | Refills: 0 | Status: SHIPPED | OUTPATIENT
Start: 2024-07-10 | End: 2024-08-09

## 2024-07-10 NOTE — TELEPHONE ENCOUNTER
Patients son is requesting refills to be sent to [facility] since [facility] is closing:   Humalog Kwikpen  Tramadol 50mg, 1 every 6 hrs as needed   Special Stains Stage 1 - Results: Base On Clearance Noted Above

## 2024-07-10 NOTE — TELEPHONE ENCOUNTER
Elham Amado is calling to request a refill on the following medication(s):    Medication Request:  Requested Prescriptions     Pending Prescriptions Disp Refills    HUMALOG KWIKPEN 100 UNIT/ML SOPN 3 Adjustable Dose Pre-filled Pen Syringe 3     Sig: Inject 10 Units into the skin 3 times daily (before meals)    traMADol (ULTRAM) 50 MG tablet 120 tablet 0     Sig: Take 1 tablet by mouth every 6 hours as needed for Pain for up to 30 days. Max Daily Amount: 200 mg       Last Visit Date (If Applicable):  3/19/2024    Next Visit Date:    9/17/2024

## 2024-08-05 DIAGNOSIS — E11.9 INSULIN DEPENDENT TYPE 2 DIABETES MELLITUS (HCC): Primary | ICD-10-CM

## 2024-08-05 DIAGNOSIS — Z79.4 INSULIN DEPENDENT TYPE 2 DIABETES MELLITUS (HCC): Primary | ICD-10-CM

## 2024-08-05 RX ORDER — PEN NEEDLE, DIABETIC 30 GX3/16"
1 NEEDLE, DISPOSABLE MISCELLANEOUS DAILY
Qty: 100 EACH | Refills: 3 | Status: SHIPPED | OUTPATIENT
Start: 2024-08-05

## 2024-08-05 RX ORDER — INSULIN GLARGINE 100 [IU]/ML
INJECTION, SOLUTION SUBCUTANEOUS
Qty: 36 ML | Refills: 3 | Status: SHIPPED | OUTPATIENT
Start: 2024-08-05

## 2024-08-05 NOTE — TELEPHONE ENCOUNTER
Elham Amado is calling to request a refill on the following medication(s):    Medication Request:  Requested Prescriptions     Pending Prescriptions Disp Refills    LANTUS SOLOSTAR 100 UNIT/ML injection pen 36 mL 3     Sig: INJECT 40 UNITS AT BEDTIME    Insulin Pen Needle (PEN NEEDLES) 32G X 4 MM MISC 100 each 3     Si Needle by Does not apply route daily    Continuous Glucose Sensor (FREESTYLE TERRY 2 SENSOR) MISC 6 each 3     Si Units by Does not apply route every 14 days       Last Visit Date (If Applicable):  3/19/2024    Next Visit Date:    2024

## 2024-08-10 DIAGNOSIS — G89.4 CHRONIC PAIN SYNDROME: ICD-10-CM

## 2024-08-12 DIAGNOSIS — M50.30 DEGENERATIVE DISC DISEASE, CERVICAL: Primary | ICD-10-CM

## 2024-08-12 RX ORDER — TRAMADOL HYDROCHLORIDE 50 MG/1
50 TABLET ORAL EVERY 6 HOURS PRN
Qty: 120 TABLET | Refills: 1 | Status: SHIPPED | OUTPATIENT
Start: 2024-08-12 | End: 2024-10-11

## 2024-08-12 RX ORDER — TRAMADOL HYDROCHLORIDE 50 MG/1
50 TABLET ORAL EVERY 6 HOURS PRN
COMMUNITY
End: 2024-08-12 | Stop reason: SDUPTHER

## 2024-08-12 RX ORDER — TRAMADOL HYDROCHLORIDE 50 MG/1
50 TABLET ORAL EVERY 6 HOURS PRN
Qty: 120 TABLET | Refills: 0 | Status: SHIPPED | OUTPATIENT
Start: 2024-08-12 | End: 2024-09-11

## 2024-08-12 NOTE — TELEPHONE ENCOUNTER
Elham Amado is calling to request a refill on the following medication(s):    Medication Request:  Requested Prescriptions     Pending Prescriptions Disp Refills    traMADol (ULTRAM) 50 MG tablet       Sig: Take 1 tablet by mouth every 6 hours as needed for Pain. Max Daily Amount: 200 mg       Last Visit Date (If Applicable):  3/19/2024    Next Visit Date:    9/17/2024

## 2024-08-12 NOTE — TELEPHONE ENCOUNTER
Elham Amado is calling to request a refill on the following medication(s):    Medication Request:  Requested Prescriptions     Pending Prescriptions Disp Refills    traMADol (ULTRAM) 50 MG tablet [Pharmacy Med Name: traMADol HCL 50MG TABLET] 120 tablet      Sig: TAKE ONE TABLET BY MOUTH EVERY 6 HOURS AS NEEDED FOR PAIN. NOT TO EXCEED 4 TABLETS A DAY       Last Visit Date (If Applicable):  Visit date not found    Next Visit Date:    Visit date not found

## 2024-09-16 DIAGNOSIS — G89.4 CHRONIC PAIN SYNDROME: ICD-10-CM

## 2024-09-16 RX ORDER — TRAMADOL HYDROCHLORIDE 50 MG/1
50 TABLET ORAL EVERY 6 HOURS PRN
Qty: 120 TABLET | Refills: 0 | Status: SHIPPED | OUTPATIENT
Start: 2024-09-16 | End: 2024-10-16

## 2024-09-17 ENCOUNTER — OFFICE VISIT (OUTPATIENT)
Age: 79
End: 2024-09-17

## 2024-09-17 VITALS
OXYGEN SATURATION: 94 % | SYSTOLIC BLOOD PRESSURE: 140 MMHG | DIASTOLIC BLOOD PRESSURE: 80 MMHG | WEIGHT: 129.2 LBS | BODY MASS INDEX: 27.87 KG/M2 | HEART RATE: 98 BPM | HEIGHT: 57 IN | TEMPERATURE: 98 F

## 2024-09-17 DIAGNOSIS — Z79.4 INSULIN DEPENDENT TYPE 2 DIABETES MELLITUS (HCC): Primary | ICD-10-CM

## 2024-09-17 DIAGNOSIS — N18.4 STAGE 4 CHRONIC KIDNEY DISEASE (HCC): ICD-10-CM

## 2024-09-17 DIAGNOSIS — I10 ESSENTIAL HYPERTENSION: ICD-10-CM

## 2024-09-17 DIAGNOSIS — S93.104A DISLOCATION OF PHALANX OF RIGHT FOOT, INITIAL ENCOUNTER: ICD-10-CM

## 2024-09-17 DIAGNOSIS — E11.65 HYPERGLYCEMIA DUE TO DIABETES MELLITUS (HCC): ICD-10-CM

## 2024-09-17 DIAGNOSIS — E11.9 INSULIN DEPENDENT TYPE 2 DIABETES MELLITUS (HCC): Primary | ICD-10-CM

## 2024-09-17 SDOH — ECONOMIC STABILITY: FOOD INSECURITY: WITHIN THE PAST 12 MONTHS, YOU WORRIED THAT YOUR FOOD WOULD RUN OUT BEFORE YOU GOT MONEY TO BUY MORE.: NEVER TRUE

## 2024-09-17 SDOH — ECONOMIC STABILITY: FOOD INSECURITY: WITHIN THE PAST 12 MONTHS, THE FOOD YOU BOUGHT JUST DIDN'T LAST AND YOU DIDN'T HAVE MONEY TO GET MORE.: NEVER TRUE

## 2024-09-17 SDOH — ECONOMIC STABILITY: INCOME INSECURITY: HOW HARD IS IT FOR YOU TO PAY FOR THE VERY BASICS LIKE FOOD, HOUSING, MEDICAL CARE, AND HEATING?: NOT HARD AT ALL

## 2024-09-17 ASSESSMENT — PATIENT HEALTH QUESTIONNAIRE - PHQ9
1. LITTLE INTEREST OR PLEASURE IN DOING THINGS: NOT AT ALL
SUM OF ALL RESPONSES TO PHQ9 QUESTIONS 1 & 2: 0
SUM OF ALL RESPONSES TO PHQ QUESTIONS 1-9: 0
2. FEELING DOWN, DEPRESSED OR HOPELESS: NOT AT ALL

## 2024-09-20 ASSESSMENT — ENCOUNTER SYMPTOMS
CHEST TIGHTNESS: 0
SHORTNESS OF BREATH: 0

## 2024-11-06 ENCOUNTER — TELEPHONE (OUTPATIENT)
Age: 79
End: 2024-11-06

## 2024-11-06 RX ORDER — AMLODIPINE BESYLATE 5 MG/1
5 TABLET ORAL DAILY
Qty: 90 TABLET | Refills: 3 | Status: SHIPPED | OUTPATIENT
Start: 2024-11-06

## 2024-11-06 NOTE — TELEPHONE ENCOUNTER
Elham Amado is calling to request a refill on the following medication(s):    Medication Request:  Requested Prescriptions     Pending Prescriptions Disp Refills    amLODIPine (NORVASC) 5 MG tablet 90 tablet 3     Sig: Take 1 tablet by mouth daily       Last Visit Date (If Applicable):  9/17/2024    Next Visit Date:    3/4/2025

## 2024-12-16 DIAGNOSIS — G89.4 CHRONIC PAIN SYNDROME: Primary | ICD-10-CM

## 2024-12-16 DIAGNOSIS — E11.9 INSULIN DEPENDENT TYPE 2 DIABETES MELLITUS (HCC): ICD-10-CM

## 2024-12-16 DIAGNOSIS — Z79.4 INSULIN DEPENDENT TYPE 2 DIABETES MELLITUS (HCC): ICD-10-CM

## 2024-12-16 RX ORDER — TRAMADOL HYDROCHLORIDE 50 MG/1
50 TABLET ORAL EVERY 6 HOURS PRN
Qty: 120 TABLET | Refills: 0 | Status: SHIPPED | OUTPATIENT
Start: 2024-12-16 | End: 2025-01-15

## 2024-12-16 RX ORDER — INSULIN GLARGINE 100 [IU]/ML
INJECTION, SOLUTION SUBCUTANEOUS
Qty: 36 ML | Refills: 3 | Status: SHIPPED | OUTPATIENT
Start: 2024-12-16

## 2024-12-16 NOTE — TELEPHONE ENCOUNTER
Patients son requesting refills on ;  Lantus injectable, he thinks she gets a 3 mo nsupply  Tramadol- he did not know the strength but thinks she takes them 4x's daily  [facility]

## 2024-12-16 NOTE — TELEPHONE ENCOUNTER
Elham Amado is calling to request a refill on the following medication(s):    Medication Request:  Requested Prescriptions     Pending Prescriptions Disp Refills    LANTUS SOLOSTAR 100 UNIT/ML injection pen 36 mL 3     Sig: INJECT 40 UNITS AT BEDTIME    traMADol (ULTRAM) 50 MG tablet 120 tablet 0     Sig: Take 1 tablet by mouth every 6 hours as needed for Pain for up to 30 days. Max Daily Amount: 200 mg       Last Visit Date (If Applicable):  9/17/2024    Next Visit Date:    3/4/2025

## 2025-01-01 ENCOUNTER — TELEPHONE (OUTPATIENT)
Age: 80
End: 2025-01-01

## 2025-01-12 DIAGNOSIS — G89.4 CHRONIC PAIN SYNDROME: ICD-10-CM

## 2025-01-13 RX ORDER — TRAMADOL HYDROCHLORIDE 50 MG/1
50 TABLET ORAL EVERY 6 HOURS PRN
Qty: 120 TABLET | Refills: 2 | Status: SHIPPED | OUTPATIENT
Start: 2025-01-13 | End: 2025-04-13

## 2025-01-13 NOTE — TELEPHONE ENCOUNTER
Elham Amado is calling to request a refill on the following medication(s):    Medication Request:  Requested Prescriptions     Pending Prescriptions Disp Refills    traMADol (ULTRAM) 50 MG tablet [Pharmacy Med Name: traMADol HCL 50MG TABLET] 120 tablet      Sig: TAKE ONE TABLET BY MOUTH EVERY 6 HOURS AS NEEDED FOR PAIN FOR UP TO 30 DAYS. REDUCE DOSES TAKEN AS PAIN BECOMES MANAGEABLE       Last Visit Date (If Applicable):  9/17/2024    Next Visit Date:    3/4/2025

## 2025-01-24 ENCOUNTER — APPOINTMENT (OUTPATIENT)
Age: 80
DRG: 280 | End: 2025-01-24
Attending: EMERGENCY MEDICINE
Payer: MEDICARE

## 2025-01-24 ENCOUNTER — HOSPITAL ENCOUNTER (EMERGENCY)
Age: 80
Discharge: ANOTHER HOSPITAL | DRG: 280 | End: 2025-01-24
Attending: EMERGENCY MEDICINE
Payer: MEDICARE

## 2025-01-24 ENCOUNTER — APPOINTMENT (OUTPATIENT)
Dept: GENERAL RADIOLOGY | Age: 80
DRG: 280 | End: 2025-01-24
Payer: MEDICARE

## 2025-01-24 ENCOUNTER — HOSPITAL ENCOUNTER (INPATIENT)
Age: 80
LOS: 12 days | Discharge: HOSPICE | DRG: 280 | End: 2025-02-05
Attending: STUDENT IN AN ORGANIZED HEALTH CARE EDUCATION/TRAINING PROGRAM
Payer: MEDICARE

## 2025-01-24 VITALS
WEIGHT: 110 LBS | BODY MASS INDEX: 23.09 KG/M2 | SYSTOLIC BLOOD PRESSURE: 146 MMHG | HEART RATE: 101 BPM | RESPIRATION RATE: 28 BRPM | OXYGEN SATURATION: 98 % | TEMPERATURE: 97.7 F | DIASTOLIC BLOOD PRESSURE: 97 MMHG | HEIGHT: 58 IN

## 2025-01-24 DIAGNOSIS — I50.9 CONGESTIVE HEART FAILURE, UNSPECIFIED HF CHRONICITY, UNSPECIFIED HEART FAILURE TYPE (HCC): ICD-10-CM

## 2025-01-24 DIAGNOSIS — N17.9 AKI (ACUTE KIDNEY INJURY) (HCC): ICD-10-CM

## 2025-01-24 DIAGNOSIS — R79.89 ELEVATED TROPONIN: ICD-10-CM

## 2025-01-24 DIAGNOSIS — R06.02 SHORTNESS OF BREATH: Primary | ICD-10-CM

## 2025-01-24 DIAGNOSIS — J18.9 PNEUMONIA DUE TO INFECTIOUS ORGANISM, UNSPECIFIED LATERALITY, UNSPECIFIED PART OF LUNG: ICD-10-CM

## 2025-01-24 DIAGNOSIS — I50.9 ACUTE CONGESTIVE HEART FAILURE, UNSPECIFIED HEART FAILURE TYPE (HCC): ICD-10-CM

## 2025-01-24 DIAGNOSIS — I25.10 CAD, MULTIPLE VESSEL: Primary | ICD-10-CM

## 2025-01-24 LAB
ALBUMIN SERPL-MCNC: 3.8 G/DL (ref 3.5–5.2)
ALBUMIN/GLOB SERPL: 1.2 {RATIO} (ref 1–2.5)
ALLEN TEST: POSITIVE
ALP SERPL-CCNC: 114 U/L (ref 35–104)
ALT SERPL-CCNC: <5 U/L (ref 10–35)
ANION GAP SERPL CALCULATED.3IONS-SCNC: 17 MMOL/L (ref 9–17)
ANION GAP SERPL CALCULATED.3IONS-SCNC: 20 MMOL/L (ref 9–16)
ANTI-XA UNFRAC HEPARIN: 0.43 IU/L
AST SERPL-CCNC: 18 U/L (ref 10–35)
BACTERIA URNS QL MICRO: ABNORMAL
BASOPHILS # BLD: 0.1 K/UL (ref 0–0.2)
BASOPHILS NFR BLD: 1 % (ref 0–2)
BILIRUB DIRECT SERPL-MCNC: 0.1 MG/DL (ref 0–0.2)
BILIRUB INDIRECT SERPL-MCNC: 0.2 MG/DL (ref 0–1)
BILIRUB SERPL-MCNC: 0.3 MG/DL (ref 0–1.2)
BILIRUB UR QL STRIP: NEGATIVE
BNP SERPL-MCNC: ABNORMAL PG/ML
BODY TEMPERATURE: 37
BUN SERPL-MCNC: 35 MG/DL (ref 8–23)
BUN SERPL-MCNC: 39 MG/DL (ref 8–23)
CALCIUM SERPL-MCNC: 9.2 MG/DL (ref 8.6–10.4)
CALCIUM SERPL-MCNC: 9.3 MG/DL (ref 8.6–10.4)
CHARACTER UR: ABNORMAL
CHLORIDE SERPL-SCNC: 103 MMOL/L (ref 98–107)
CHLORIDE SERPL-SCNC: 105 MMOL/L (ref 98–107)
CLARITY UR: CLEAR
CO2 SERPL-SCNC: 15 MMOL/L (ref 20–31)
CO2 SERPL-SCNC: 17 MMOL/L (ref 20–31)
COHGB MFR BLD: 0.3 % (ref 0–5)
COLOR UR: YELLOW
CREAT SERPL-MCNC: 3.4 MG/DL (ref 0.5–0.9)
CREAT SERPL-MCNC: 3.4 MG/DL (ref 0.6–0.9)
D DIMER PPP FEU-MCNC: 0.68 UG/ML FEU (ref 0–0.59)
ECHO AO ROOT DIAM: 3.3 CM
ECHO AO ROOT INDEX: 2.34 CM/M2
ECHO AV AREA PEAK VELOCITY: 1.4 CM2
ECHO AV AREA VTI: 1.3 CM2
ECHO AV AREA/BSA PEAK VELOCITY: 1 CM2/M2
ECHO AV AREA/BSA VTI: 0.9 CM2/M2
ECHO AV MEAN GRADIENT: 5 MMHG
ECHO AV MEAN VELOCITY: 1 M/S
ECHO AV PEAK GRADIENT: 8 MMHG
ECHO AV PEAK VELOCITY: 1.4 M/S
ECHO AV VELOCITY RATIO: 0.5
ECHO AV VTI: 26.7 CM
ECHO BSA: 1.43 M2
ECHO EST RA PRESSURE: 5 MMHG
ECHO IVC PROX: 0.9 CM
ECHO LA AREA 2C: 12.2 CM2
ECHO LA AREA 4C: 13.5 CM2
ECHO LA DIAMETER INDEX: 3.12 CM/M2
ECHO LA DIAMETER: 4.4 CM
ECHO LA MAJOR AXIS: 4.7 CM
ECHO LA MINOR AXIS: 5.5 CM
ECHO LA TO AORTIC ROOT RATIO: 1.33
ECHO LA VOL BP: 28 ML (ref 22–52)
ECHO LA VOL MOD A2C: 24 ML (ref 22–52)
ECHO LA VOL MOD A4C: 30 ML (ref 22–52)
ECHO LA VOL/BSA BIPLANE: 20 ML/M2 (ref 16–34)
ECHO LA VOLUME INDEX MOD A2C: 17 ML/M2 (ref 16–34)
ECHO LA VOLUME INDEX MOD A4C: 21 ML/M2 (ref 16–34)
ECHO LV E' LATERAL VELOCITY: 3.48 CM/S
ECHO LV EDV A2C: 53 ML
ECHO LV EDV A4C: 85 ML
ECHO LV EDV INDEX A4C: 60 ML/M2
ECHO LV EDV NDEX A2C: 38 ML/M2
ECHO LV EJECTION FRACTION A2C: 21 %
ECHO LV EJECTION FRACTION A4C: 29 %
ECHO LV EJECTION FRACTION BIPLANE: 25 % (ref 55–100)
ECHO LV ESV A2C: 42 ML
ECHO LV ESV A4C: 60 ML
ECHO LV ESV INDEX A2C: 30 ML/M2
ECHO LV ESV INDEX A4C: 43 ML/M2
ECHO LV FRACTIONAL SHORTENING: 13 % (ref 28–44)
ECHO LV INTERNAL DIMENSION DIASTOLE INDEX: 2.7 CM/M2
ECHO LV INTERNAL DIMENSION DIASTOLIC: 3.8 CM (ref 3.9–5.3)
ECHO LV INTERNAL DIMENSION SYSTOLIC INDEX: 2.34 CM/M2
ECHO LV INTERNAL DIMENSION SYSTOLIC: 3.3 CM
ECHO LV IVSD: 1.1 CM (ref 0.6–0.9)
ECHO LV MASS 2D: 134.7 G (ref 67–162)
ECHO LV MASS INDEX 2D: 95.5 G/M2 (ref 43–95)
ECHO LV POSTERIOR WALL DIASTOLIC: 1.1 CM (ref 0.6–0.9)
ECHO LV RELATIVE WALL THICKNESS RATIO: 0.58
ECHO LVOT AREA: 2.8 CM2
ECHO LVOT AV VTI INDEX: 0.45
ECHO LVOT DIAM: 1.9 CM
ECHO LVOT MEAN GRADIENT: 1 MMHG
ECHO LVOT PEAK GRADIENT: 2 MMHG
ECHO LVOT PEAK VELOCITY: 0.7 M/S
ECHO LVOT STROKE VOLUME INDEX: 23.9 ML/M2
ECHO LVOT SV: 33.7 ML
ECHO LVOT VTI: 11.9 CM
ECHO MV A VELOCITY: 1.65 M/S
ECHO MV AREA VTI: 1.7 CM2
ECHO MV E VELOCITY: 0.71 M/S
ECHO MV E/A RATIO: 0.43
ECHO MV E/E' LATERAL: 20.4
ECHO MV LVOT VTI INDEX: 1.66
ECHO MV MAX VELOCITY: 1.7 M/S
ECHO MV MEAN GRADIENT: 6 MMHG
ECHO MV MEAN VELOCITY: 1.1 M/S
ECHO MV PEAK GRADIENT: 11 MMHG
ECHO MV VTI: 19.7 CM
ECHO PV MAX VELOCITY: 1.3 M/S
ECHO PV PEAK GRADIENT: 6 MMHG
ECHO PVEIN PEAK D VELOCITY: 0.4 M/S
ECHO PVEIN PEAK S VELOCITY: 0.6 M/S
ECHO PVEIN S/D RATIO: 1.5 NO UNITS
ECHO RA AREA 4C: 6.9 CM2
ECHO RA END SYSTOLIC VOLUME APICAL 4 CHAMBER INDEX BSA: 8 ML/M2
ECHO RA VOLUME: 11 ML
ECHO RIGHT VENTRICULAR SYSTOLIC PRESSURE (RVSP): 37 MMHG
ECHO RV BASAL DIMENSION: 2.4 CM
ECHO RV FREE WALL PEAK S': 16.6 CM/S
ECHO RV TAPSE: 1.6 CM (ref 1.7–?)
ECHO TV REGURGITANT MAX VELOCITY: 2.84 M/S
ECHO TV REGURGITANT PEAK GRADIENT: 32 MMHG
EOSINOPHIL # BLD: 0.4 K/UL (ref 0–0.4)
EOSINOPHILS RELATIVE PERCENT: 3 % (ref 1–4)
EPI CELLS #/AREA URNS HPF: ABNORMAL /HPF (ref 0–5)
ERYTHROCYTE [DISTWIDTH] IN BLOOD BY AUTOMATED COUNT: 13.5 % (ref 12.5–15.4)
FERRITIN SERPL-MCNC: 85 NG/ML
FIO2 ON VENT: ABNORMAL %
FIO2: 30
FLUAV AG SPEC QL: NEGATIVE
FLUBV AG SPEC QL: NEGATIVE
GFR, ESTIMATED: 13 ML/MIN/1.73M2
GFR, ESTIMATED: 13 ML/MIN/1.73M2
GLOBULIN SER CALC-MCNC: 3.3 G/DL
GLUCOSE BLD-MCNC: 313 MG/DL (ref 65–105)
GLUCOSE SERPL-MCNC: 276 MG/DL (ref 70–99)
GLUCOSE SERPL-MCNC: 347 MG/DL (ref 74–99)
GLUCOSE UR STRIP-MCNC: ABNORMAL MG/DL
HCO3 VENOUS: 18.4 MMOL/L (ref 24–30)
HCT VFR BLD AUTO: 39.1 % (ref 36–46)
HGB BLD-MCNC: 12.9 G/DL (ref 12–16)
HGB UR QL STRIP.AUTO: ABNORMAL
IRON SATN MFR SERPL: 23 % (ref 20–55)
IRON SERPL-MCNC: 58 UG/DL (ref 37–145)
KETONES UR STRIP-MCNC: NEGATIVE MG/DL
LACTATE BLDV-SCNC: 1.9 MMOL/L (ref 0.5–2.2)
LACTATE BLDV-SCNC: 2 MMOL/L (ref 0.5–1.9)
LEUKOCYTE ESTERASE UR QL STRIP: NEGATIVE
LYMPHOCYTES NFR BLD: 3.7 K/UL (ref 1–4.8)
LYMPHOCYTES RELATIVE PERCENT: 32 % (ref 24–44)
MCH RBC QN AUTO: 31.1 PG (ref 26–34)
MCHC RBC AUTO-ENTMCNC: 33 G/DL (ref 31–37)
MCV RBC AUTO: 94.2 FL (ref 80–100)
MODE: ABNORMAL
MONOCYTES NFR BLD: 0.5 K/UL (ref 0.1–1.2)
MONOCYTES NFR BLD: 4 % (ref 2–11)
NEGATIVE BASE EXCESS, ART: 3.7 MMOL/L (ref 0–2)
NEGATIVE BASE EXCESS, VEN: 6.8 MMOL/L (ref 0–2)
NEUTROPHILS NFR BLD: 60 % (ref 36–66)
NEUTS SEG NFR BLD: 7 K/UL (ref 1.8–7.7)
NITRITE UR QL STRIP: NEGATIVE
O2 DELIVERY DEVICE: ABNORMAL
O2 SAT, VEN: 77.7 % (ref 60–85)
PARTIAL THROMBOPLASTIN TIME: 26.1 SEC (ref 24–36)
PCO2 VENOUS: 36.2 MM HG (ref 39–55)
PH UR STRIP: 6 [PH] (ref 5–8)
PH VENOUS: 7.33 (ref 7.32–7.42)
PLATELET # BLD AUTO: 370 K/UL (ref 140–450)
PMV BLD AUTO: 8.5 FL (ref 6–12)
PO2 VENOUS: 44.7 MM HG (ref 30–50)
POC HCO3: 20.4 MMOL/L (ref 21–28)
POC O2 SATURATION: 93.3 % (ref 94–98)
POC PCO2: 33.2 MM HG (ref 35–48)
POC PH: 7.4 (ref 7.35–7.45)
POC PO2: 67.5 MM HG (ref 83–108)
POTASSIUM SERPL-SCNC: 4 MMOL/L (ref 3.7–5.3)
POTASSIUM SERPL-SCNC: 4.8 MMOL/L (ref 3.7–5.3)
PROT SERPL-MCNC: 7.1 G/DL (ref 6.6–8.7)
PROT UR STRIP-MCNC: ABNORMAL MG/DL
RBC # BLD AUTO: 4.15 M/UL (ref 4–5.2)
RBC #/AREA URNS HPF: ABNORMAL /HPF (ref 0–2)
SAMPLE SITE: ABNORMAL
SARS-COV-2 RDRP RESP QL NAA+PROBE: NOT DETECTED
SODIUM SERPL-SCNC: 138 MMOL/L (ref 136–145)
SODIUM SERPL-SCNC: 139 MMOL/L (ref 135–144)
SP GR UR STRIP: 1.02 (ref 1–1.03)
SPECIMEN DESCRIPTION: NORMAL
TIBC SERPL-MCNC: 250 UG/DL (ref 250–450)
TROPONIN I SERPL HS-MCNC: 106 NG/L (ref 0–14)
TROPONIN I SERPL HS-MCNC: 108 NG/L (ref 0–14)
TROPONIN I SERPL HS-MCNC: 91 NG/L (ref 0–14)
TSH SERPL DL<=0.05 MIU/L-ACNC: 0.87 UIU/ML (ref 0.27–4.2)
UNSATURATED IRON BINDING CAPACITY: 192 UG/DL (ref 112–347)
UROBILINOGEN UR STRIP-ACNC: NORMAL EU/DL (ref 0–1)
WBC #/AREA URNS HPF: ABNORMAL /HPF (ref 0–5)
WBC OTHER # BLD: 11.6 K/UL (ref 3.5–11)

## 2025-01-24 PROCEDURE — 36415 COLL VENOUS BLD VENIPUNCTURE: CPT

## 2025-01-24 PROCEDURE — 6360000002 HC RX W HCPCS: Performed by: NURSE PRACTITIONER

## 2025-01-24 PROCEDURE — 83605 ASSAY OF LACTIC ACID: CPT

## 2025-01-24 PROCEDURE — 84443 ASSAY THYROID STIM HORMONE: CPT

## 2025-01-24 PROCEDURE — 80076 HEPATIC FUNCTION PANEL: CPT

## 2025-01-24 PROCEDURE — 2580000003 HC RX 258: Performed by: EMERGENCY MEDICINE

## 2025-01-24 PROCEDURE — 85025 COMPLETE CBC W/AUTO DIFF WBC: CPT

## 2025-01-24 PROCEDURE — 96366 THER/PROPH/DIAG IV INF ADDON: CPT

## 2025-01-24 PROCEDURE — 80048 BASIC METABOLIC PNL TOTAL CA: CPT

## 2025-01-24 PROCEDURE — 71045 X-RAY EXAM CHEST 1 VIEW: CPT

## 2025-01-24 PROCEDURE — 2500000003 HC RX 250 WO HCPCS: Performed by: NURSE PRACTITIONER

## 2025-01-24 PROCEDURE — 6370000000 HC RX 637 (ALT 250 FOR IP): Performed by: EMERGENCY MEDICINE

## 2025-01-24 PROCEDURE — 2700000000 HC OXYGEN THERAPY PER DAY

## 2025-01-24 PROCEDURE — 93005 ELECTROCARDIOGRAM TRACING: CPT | Performed by: EMERGENCY MEDICINE

## 2025-01-24 PROCEDURE — 99285 EMERGENCY DEPT VISIT HI MDM: CPT

## 2025-01-24 PROCEDURE — 96375 TX/PRO/DX INJ NEW DRUG ADDON: CPT

## 2025-01-24 PROCEDURE — 85730 THROMBOPLASTIN TIME PARTIAL: CPT

## 2025-01-24 PROCEDURE — 6370000000 HC RX 637 (ALT 250 FOR IP): Performed by: NURSE PRACTITIONER

## 2025-01-24 PROCEDURE — 0202U NFCT DS 22 TRGT SARS-COV-2: CPT

## 2025-01-24 PROCEDURE — 2060000000 HC ICU INTERMEDIATE R&B

## 2025-01-24 PROCEDURE — 99223 1ST HOSP IP/OBS HIGH 75: CPT | Performed by: PHYSICIAN ASSISTANT

## 2025-01-24 PROCEDURE — 93306 TTE W/DOPPLER COMPLETE: CPT

## 2025-01-24 PROCEDURE — 82803 BLOOD GASES ANY COMBINATION: CPT

## 2025-01-24 PROCEDURE — 6370000000 HC RX 637 (ALT 250 FOR IP): Performed by: PHYSICIAN ASSISTANT

## 2025-01-24 PROCEDURE — 85379 FIBRIN DEGRADATION QUANT: CPT

## 2025-01-24 PROCEDURE — 84484 ASSAY OF TROPONIN QUANT: CPT

## 2025-01-24 PROCEDURE — 94660 CPAP INITIATION&MGMT: CPT

## 2025-01-24 PROCEDURE — 82728 ASSAY OF FERRITIN: CPT

## 2025-01-24 PROCEDURE — 2500000003 HC RX 250 WO HCPCS: Performed by: EMERGENCY MEDICINE

## 2025-01-24 PROCEDURE — 83880 ASSAY OF NATRIURETIC PEPTIDE: CPT

## 2025-01-24 PROCEDURE — 94761 N-INVAS EAR/PLS OXIMETRY MLT: CPT

## 2025-01-24 PROCEDURE — 83550 IRON BINDING TEST: CPT

## 2025-01-24 PROCEDURE — 85520 HEPARIN ASSAY: CPT

## 2025-01-24 PROCEDURE — 87804 INFLUENZA ASSAY W/OPTIC: CPT

## 2025-01-24 PROCEDURE — 82805 BLOOD GASES W/O2 SATURATION: CPT

## 2025-01-24 PROCEDURE — 87040 BLOOD CULTURE FOR BACTERIA: CPT

## 2025-01-24 PROCEDURE — 96365 THER/PROPH/DIAG IV INF INIT: CPT

## 2025-01-24 PROCEDURE — 36600 WITHDRAWAL OF ARTERIAL BLOOD: CPT

## 2025-01-24 PROCEDURE — 83540 ASSAY OF IRON: CPT

## 2025-01-24 PROCEDURE — 6360000002 HC RX W HCPCS: Performed by: EMERGENCY MEDICINE

## 2025-01-24 PROCEDURE — 93306 TTE W/DOPPLER COMPLETE: CPT | Performed by: STUDENT IN AN ORGANIZED HEALTH CARE EDUCATION/TRAINING PROGRAM

## 2025-01-24 PROCEDURE — 87635 SARS-COV-2 COVID-19 AMP PRB: CPT

## 2025-01-24 PROCEDURE — 6360000002 HC RX W HCPCS: Performed by: PHYSICIAN ASSISTANT

## 2025-01-24 PROCEDURE — 82947 ASSAY GLUCOSE BLOOD QUANT: CPT

## 2025-01-24 PROCEDURE — 81001 URINALYSIS AUTO W/SCOPE: CPT

## 2025-01-24 RX ORDER — ONDANSETRON 2 MG/ML
4 INJECTION INTRAMUSCULAR; INTRAVENOUS EVERY 6 HOURS PRN
Status: DISCONTINUED | OUTPATIENT
Start: 2025-01-24 | End: 2025-02-05 | Stop reason: HOSPADM

## 2025-01-24 RX ORDER — LEVALBUTEROL INHALATION SOLUTION 0.63 MG/3ML
0.63 SOLUTION RESPIRATORY (INHALATION)
Status: DISCONTINUED | OUTPATIENT
Start: 2025-01-24 | End: 2025-01-26

## 2025-01-24 RX ORDER — HEPARIN SODIUM 10000 [USP'U]/100ML
5-30 INJECTION, SOLUTION INTRAVENOUS CONTINUOUS
Status: DISCONTINUED | OUTPATIENT
Start: 2025-01-24 | End: 2025-01-24

## 2025-01-24 RX ORDER — LISINOPRIL 5 MG/1
5 TABLET ORAL DAILY
Status: DISCONTINUED | OUTPATIENT
Start: 2025-01-25 | End: 2025-01-27

## 2025-01-24 RX ORDER — CARVEDILOL 3.12 MG/1
3.12 TABLET ORAL 2 TIMES DAILY WITH MEALS
Status: DISCONTINUED | OUTPATIENT
Start: 2025-01-25 | End: 2025-01-25

## 2025-01-24 RX ORDER — HEPARIN SODIUM 10000 [USP'U]/100ML
5-30 INJECTION, SOLUTION INTRAVENOUS CONTINUOUS
Status: DISCONTINUED | OUTPATIENT
Start: 2025-01-24 | End: 2025-02-04

## 2025-01-24 RX ORDER — HEPARIN SODIUM 1000 [USP'U]/ML
60 INJECTION, SOLUTION INTRAVENOUS; SUBCUTANEOUS PRN
Status: DISCONTINUED | OUTPATIENT
Start: 2025-01-24 | End: 2025-01-24

## 2025-01-24 RX ORDER — HEPARIN SODIUM 1000 [USP'U]/ML
30 INJECTION, SOLUTION INTRAVENOUS; SUBCUTANEOUS PRN
Status: DISCONTINUED | OUTPATIENT
Start: 2025-01-24 | End: 2025-01-24 | Stop reason: HOSPADM

## 2025-01-24 RX ORDER — HEPARIN SODIUM 1000 [USP'U]/ML
30 INJECTION, SOLUTION INTRAVENOUS; SUBCUTANEOUS PRN
Status: DISCONTINUED | OUTPATIENT
Start: 2025-01-24 | End: 2025-01-24

## 2025-01-24 RX ORDER — ACETAMINOPHEN 325 MG/1
650 TABLET ORAL EVERY 6 HOURS PRN
Status: DISCONTINUED | OUTPATIENT
Start: 2025-01-24 | End: 2025-02-05 | Stop reason: HOSPADM

## 2025-01-24 RX ORDER — AMLODIPINE BESYLATE 5 MG/1
5 TABLET ORAL DAILY
Status: DISCONTINUED | OUTPATIENT
Start: 2025-01-24 | End: 2025-01-25

## 2025-01-24 RX ORDER — ASPIRIN 81 MG/1
81 TABLET, CHEWABLE ORAL DAILY
Status: DISCONTINUED | OUTPATIENT
Start: 2025-01-25 | End: 2025-02-05 | Stop reason: HOSPADM

## 2025-01-24 RX ORDER — FUROSEMIDE 10 MG/ML
40 INJECTION INTRAMUSCULAR; INTRAVENOUS ONCE
Status: COMPLETED | OUTPATIENT
Start: 2025-01-24 | End: 2025-01-24

## 2025-01-24 RX ORDER — INSULIN LISPRO 100 [IU]/ML
0-8 INJECTION, SOLUTION INTRAVENOUS; SUBCUTANEOUS
Status: DISCONTINUED | OUTPATIENT
Start: 2025-01-24 | End: 2025-02-05

## 2025-01-24 RX ORDER — HEPARIN SODIUM 1000 [USP'U]/ML
60 INJECTION, SOLUTION INTRAVENOUS; SUBCUTANEOUS PRN
Status: DISCONTINUED | OUTPATIENT
Start: 2025-01-24 | End: 2025-02-04

## 2025-01-24 RX ORDER — ATORVASTATIN CALCIUM 40 MG/1
40 TABLET, FILM COATED ORAL NIGHTLY
Status: DISCONTINUED | OUTPATIENT
Start: 2025-01-24 | End: 2025-02-01

## 2025-01-24 RX ORDER — FUROSEMIDE 10 MG/ML
40 INJECTION INTRAMUSCULAR; INTRAVENOUS 2 TIMES DAILY
Status: DISCONTINUED | OUTPATIENT
Start: 2025-01-24 | End: 2025-01-30

## 2025-01-24 RX ORDER — BENZONATATE 100 MG/1
100 CAPSULE ORAL 3 TIMES DAILY PRN
Status: DISCONTINUED | OUTPATIENT
Start: 2025-01-24 | End: 2025-02-05 | Stop reason: HOSPADM

## 2025-01-24 RX ORDER — HEPARIN SODIUM 1000 [USP'U]/ML
60 INJECTION, SOLUTION INTRAVENOUS; SUBCUTANEOUS PRN
Status: DISCONTINUED | OUTPATIENT
Start: 2025-01-24 | End: 2025-01-24 | Stop reason: HOSPADM

## 2025-01-24 RX ORDER — POLYETHYLENE GLYCOL 3350 17 G/17G
17 POWDER, FOR SOLUTION ORAL DAILY PRN
Status: DISCONTINUED | OUTPATIENT
Start: 2025-01-24 | End: 2025-02-05 | Stop reason: HOSPADM

## 2025-01-24 RX ORDER — HEPARIN SODIUM 10000 [USP'U]/100ML
5-30 INJECTION, SOLUTION INTRAVENOUS CONTINUOUS
Status: DISCONTINUED | OUTPATIENT
Start: 2025-01-24 | End: 2025-01-24 | Stop reason: HOSPADM

## 2025-01-24 RX ORDER — SODIUM CHLORIDE 9 MG/ML
INJECTION, SOLUTION INTRAVENOUS PRN
Status: DISCONTINUED | OUTPATIENT
Start: 2025-01-24 | End: 2025-02-05 | Stop reason: HOSPADM

## 2025-01-24 RX ORDER — INSULIN GLARGINE 100 [IU]/ML
5 INJECTION, SOLUTION SUBCUTANEOUS NIGHTLY
Status: DISCONTINUED | OUTPATIENT
Start: 2025-01-24 | End: 2025-01-26

## 2025-01-24 RX ORDER — GUAIFENESIN 600 MG/1
600 TABLET, EXTENDED RELEASE ORAL 2 TIMES DAILY
Status: DISCONTINUED | OUTPATIENT
Start: 2025-01-24 | End: 2025-02-05 | Stop reason: HOSPADM

## 2025-01-24 RX ORDER — HEPARIN SODIUM 1000 [USP'U]/ML
60 INJECTION, SOLUTION INTRAVENOUS; SUBCUTANEOUS ONCE
Status: COMPLETED | OUTPATIENT
Start: 2025-01-24 | End: 2025-01-24

## 2025-01-24 RX ORDER — ACETAMINOPHEN 650 MG/1
650 SUPPOSITORY RECTAL EVERY 6 HOURS PRN
Status: DISCONTINUED | OUTPATIENT
Start: 2025-01-24 | End: 2025-02-05 | Stop reason: HOSPADM

## 2025-01-24 RX ORDER — SODIUM CHLORIDE 0.9 % (FLUSH) 0.9 %
10 SYRINGE (ML) INJECTION PRN
Status: DISCONTINUED | OUTPATIENT
Start: 2025-01-24 | End: 2025-02-05 | Stop reason: HOSPADM

## 2025-01-24 RX ORDER — SODIUM CHLORIDE 0.9 % (FLUSH) 0.9 %
5-40 SYRINGE (ML) INJECTION EVERY 12 HOURS SCHEDULED
Status: DISCONTINUED | OUTPATIENT
Start: 2025-01-24 | End: 2025-02-05 | Stop reason: HOSPADM

## 2025-01-24 RX ORDER — HEPARIN SODIUM 1000 [USP'U]/ML
30 INJECTION, SOLUTION INTRAVENOUS; SUBCUTANEOUS PRN
Status: DISCONTINUED | OUTPATIENT
Start: 2025-01-24 | End: 2025-02-04

## 2025-01-24 RX ORDER — GLUCAGON 1 MG/ML
1 KIT INJECTION PRN
Status: DISCONTINUED | OUTPATIENT
Start: 2025-01-24 | End: 2025-02-05 | Stop reason: HOSPADM

## 2025-01-24 RX ORDER — IPRATROPIUM BROMIDE AND ALBUTEROL SULFATE 2.5; .5 MG/3ML; MG/3ML
1 SOLUTION RESPIRATORY (INHALATION) ONCE
Status: COMPLETED | OUTPATIENT
Start: 2025-01-24 | End: 2025-01-24

## 2025-01-24 RX ORDER — DEXTROSE MONOHYDRATE 100 MG/ML
INJECTION, SOLUTION INTRAVENOUS CONTINUOUS PRN
Status: DISCONTINUED | OUTPATIENT
Start: 2025-01-24 | End: 2025-02-05 | Stop reason: HOSPADM

## 2025-01-24 RX ORDER — ONDANSETRON 4 MG/1
4 TABLET, ORALLY DISINTEGRATING ORAL EVERY 8 HOURS PRN
Status: DISCONTINUED | OUTPATIENT
Start: 2025-01-24 | End: 2025-02-05 | Stop reason: HOSPADM

## 2025-01-24 RX ADMIN — FUROSEMIDE 40 MG: 10 INJECTION, SOLUTION INTRAMUSCULAR; INTRAVENOUS at 21:34

## 2025-01-24 RX ADMIN — GUAIFENESIN 600 MG: 600 TABLET, EXTENDED RELEASE ORAL at 23:08

## 2025-01-24 RX ADMIN — WATER 1000 MG: 1 INJECTION INTRAMUSCULAR; INTRAVENOUS; SUBCUTANEOUS at 16:24

## 2025-01-24 RX ADMIN — HEPARIN SODIUM 12 UNITS/KG/HR: 10000 INJECTION, SOLUTION INTRAVENOUS at 20:30

## 2025-01-24 RX ADMIN — AZITHROMYCIN MONOHYDRATE 500 MG: 500 INJECTION, POWDER, LYOPHILIZED, FOR SOLUTION INTRAVENOUS at 16:29

## 2025-01-24 RX ADMIN — INSULIN LISPRO 6 UNITS: 100 INJECTION, SOLUTION INTRAVENOUS; SUBCUTANEOUS at 21:34

## 2025-01-24 RX ADMIN — METHYLPREDNISOLONE SODIUM SUCCINATE 125 MG: 125 INJECTION, POWDER, FOR SOLUTION INTRAMUSCULAR; INTRAVENOUS at 10:02

## 2025-01-24 RX ADMIN — HEPARIN SODIUM 3000 UNITS: 1000 INJECTION INTRAVENOUS; SUBCUTANEOUS at 12:51

## 2025-01-24 RX ADMIN — ATORVASTATIN CALCIUM 40 MG: 40 TABLET, FILM COATED ORAL at 21:34

## 2025-01-24 RX ADMIN — FUROSEMIDE 40 MG: 10 INJECTION, SOLUTION INTRAMUSCULAR; INTRAVENOUS at 10:38

## 2025-01-24 RX ADMIN — HEPARIN SODIUM 12 UNITS/KG/HR: 10000 INJECTION, SOLUTION INTRAVENOUS at 12:53

## 2025-01-24 RX ADMIN — AMLODIPINE BESYLATE 5 MG: 5 TABLET ORAL at 21:34

## 2025-01-24 RX ADMIN — IPRATROPIUM BROMIDE AND ALBUTEROL SULFATE 1 DOSE: 2.5; .5 SOLUTION RESPIRATORY (INHALATION) at 09:40

## 2025-01-24 RX ADMIN — SODIUM CHLORIDE, PRESERVATIVE FREE 10 ML: 5 INJECTION INTRAVENOUS at 21:35

## 2025-01-24 RX ADMIN — INSULIN GLARGINE 5 UNITS: 100 INJECTION, SOLUTION SUBCUTANEOUS at 23:08

## 2025-01-24 ASSESSMENT — LIFESTYLE VARIABLES
HOW MANY STANDARD DRINKS CONTAINING ALCOHOL DO YOU HAVE ON A TYPICAL DAY: PATIENT DOES NOT DRINK
HOW OFTEN DO YOU HAVE A DRINK CONTAINING ALCOHOL: NEVER

## 2025-01-24 ASSESSMENT — PAIN - FUNCTIONAL ASSESSMENT
PAIN_FUNCTIONAL_ASSESSMENT: NONE - DENIES PAIN
PAIN_FUNCTIONAL_ASSESSMENT: NONE - DENIES PAIN

## 2025-01-24 NOTE — ED NOTES
Pt presents with squad after complaining of shortness of breath and audible wheezing since this morning. Son resides with patient and states she has been \"struggling\" with shortness of breath for the olast few days. Son reports the house being very dry. They recently put a new heating and cold system in the house which he feels that will help the dryness in the house. Pt was give one albuterol treatment en route by EMS. Pt was placed on full caridac monitor apon arrival to dept. Pt was 91 % on RA, pt was given 2 L via NC- oxygen saturation came up to 99 %. Pt was given another breathing treatment and pt sound much better.  Will tarikue to monitor

## 2025-01-24 NOTE — ED NOTES
Patient off Bipap for skin break and per her request.  Nasal cannula on at 2lpm. Will continue to monitor patient.

## 2025-01-24 NOTE — ED NOTES
Report given to Fidelina ESTRADA at [facility]. All questions answered

## 2025-01-24 NOTE — ED NOTES
Pt placed back on BIPAP. Pt needing to urinate frequently due to lasix. Pt unable to use the bedpan , so pt requiring to get up on the bedside commode

## 2025-01-24 NOTE — ED NOTES
[facility]    Rm 2027   Dr Natan Alexis accepting  Report can be called to [phone]  Will callback with ETA for ALS transport

## 2025-01-24 NOTE — PROGRESS NOTES
Spiritual Health History and Assessment/Progress Note  [facility]    (P) Spiritual/Emotional Needs, Initial Encounter,  , (P) Life Adjustments, Adjustment to illness,      Name: Elham Amado MRN: [id number]    Age: 80 y.o.     Sex: female   Language: English   Hoahaoism: Zoroastrianism   <principal problem not specified>     Date: 1/24/2025            Total Time Calculated: (P) 15 min              Spiritual Assessment began in [facility] EMERGENCY DEPARTMENT        Referral/Consult From: (P) Rounding   Encounter Overview/Reason: (P) Spiritual/Emotional Needs, Initial Encounter  Service Provided For: (P) Patient, Family       introduced self and pastoral role. Pt. Was awake and alert. Pt. Was welcoming and open to conversation. Will follow up on Pt.  provided support and active listening and comfort. Provided prayer as requested  for comfort and strength.    Cindy, Belief, Meaning:   Patient has beliefs or practices that help with coping during difficult times  Family/Friends have beliefs or practices that help with coping during difficult times      Importance and Influence:  Patient has spiritual/personal beliefs that influence decisions regarding their health  Family/Friends have spiritual/personal beliefs that influence decisions regarding the patient's health    Community:  Patient feels well-supported. Support system includes: Children  Family/Friends feel well-supported. Support system includes: Parent/s and Extended family    Assessment and Plan of Care:     Patient Interventions include: Facilitated expression of thoughts and feelings and Explored spiritual coping/struggle/distress  Family/Friends Interventions include: Facilitated expression of thoughts and feelings    Patient Plan of Care: Spiritual Care available upon further referral  Family/Friends Plan of Care: Spiritual Care available upon further referral    Electronically signed by Chaplain NANDA on  1/24/2025 at 4:06 PM    01/24/25 1604   Encounter Summary   Encounter Overview/Reason Spiritual/Emotional Needs;Initial Encounter   Service Provided For Patient;Family   Referral/Consult From [facility]   Support System Children   Last Encounter  01/24/25   Complexity of Encounter Moderate   Begin Time 1450   End Time  1505   Total Time Calculated 15 min   Spiritual/Emotional needs   Type Spiritual Support   Grief, Loss, and Adjustments   Type Life Adjustments;Adjustment to illness   Assessment/Intervention/Outcome   Assessment Calm;Coping;Hopeful   Intervention Prayer (assurance of)/[place];Sustaining Presence/Ministry of presence;Explored/Affirmed feelings, thoughts, concerns;Discussed meaning/purpose;Discussed belief system/Zoroastrian practices/dereck;Active listening   Outcome Coping;Encouraged;Engaged in conversation;Expressed feelings, needs, and concerns;Expressed Gratitude   Plan and Referrals   Plan/Referrals Continue to visit, (comment)

## 2025-01-24 NOTE — ED PROVIDER NOTES
[facility] Emergency Department  [address]  [place] [address]  Phone: [phone]  Fax: [phone]  EMERGENCY DEPARTMENT ENCOUNTER      Pt Name: Elham Amado  MRN: [id number]  Birthdate 1945  Date of evaluation: 1/24/2025    CHIEF COMPLAINT     Shortness of breath and wheezing    HISTORY OF PRESENT ILLNESS    Elham Amado is a 80 y.o. female who presents to the emergency department from home by ambulance for worsening shortness of breath and wheezing.  Given an albuterol treatment en route and is feeling a little bit better.  History of asthma.  Non-smoker.  No known sick contacts.  Denies chest pain but does admit to some tightness with her wheezing.  Nonproductive cough.  No other relevant symptoms.  On arrival by paramedics pulse ox 93%.  Previous CVA with left-sided weakness    REVIEW OF SYSTEMS       Constitutional: No fevers or chills   HENT: No sore throat, rhinorrhea, or earache   Eyes: No blurry vision or double vision no drainage   Cardiovascular: No chest pain or tachycardia positive chest tightness  Respiratory: Positive wheezing shortness of breath and cough  Gastrointestinal: No nausea, vomiting, diarrhea, constipation, or abdominal pain   : No hematuria or dysuria   Musculoskeletal: No swelling or pain   Skin: No rash   Neurological: Chronic lower and upper extremity weakness on the left no headache    PAST MEDICAL HISTORY    has a past medical history of Anxiety, Cervicalgia, Diabetes mellitus (HCC), Dorsalgia, Hemiplegia (HCC), Hypertension, Mixed hyperlipidemia, Osteoporosis, and Stroke (cerebrum) ([facility]).    SURGICAL HISTORY      has a past surgical history that includes back surgery and knee surgery.    CURRENT MEDICATIONS       Previous Medications    AMLODIPINE (NORVASC) 5 MG TABLET    Take 1 tablet by mouth daily    ASCORBIC ACID (VITAMIN C) 1000 MG TABLET    Take 1 tablet by mouth daily    BLOOD GLUCOSE TEST STRIPS (ASCENSIA AUTODISC VI;ONE TOUCH ULTRA TEST  words are mis-transcribed.  Additionally, portions of this note may also include information that was incorporated after care transfer to another provider that were not available at the time of my evaluation.  Some of this information could likely include laboratory values, vital sign updates, medications etc.)    Dominic Hunter DO   Attending Emergency Physician      Dominic Hunter,   01/24/25 1421       Dominic Hunter,   01/24/25 1500

## 2025-01-25 ENCOUNTER — APPOINTMENT (OUTPATIENT)
Dept: ULTRASOUND IMAGING | Age: 80
DRG: 280 | End: 2025-01-25
Attending: STUDENT IN AN ORGANIZED HEALTH CARE EDUCATION/TRAINING PROGRAM
Payer: MEDICARE

## 2025-01-25 ENCOUNTER — APPOINTMENT (OUTPATIENT)
Age: 80
DRG: 280 | End: 2025-01-25
Attending: STUDENT IN AN ORGANIZED HEALTH CARE EDUCATION/TRAINING PROGRAM
Payer: MEDICARE

## 2025-01-25 PROBLEM — R80.1 PERSISTENT PROTEINURIA: Status: ACTIVE | Noted: 2025-01-25

## 2025-01-25 LAB
ANION GAP SERPL CALCULATED.3IONS-SCNC: 15 MMOL/L (ref 9–16)
ANION GAP SERPL CALCULATED.3IONS-SCNC: 18 MMOL/L (ref 9–16)
ANTI-XA UNFRAC HEPARIN: 0.33 IU/L
B PARAP IS1001 DNA NPH QL NAA+NON-PROBE: NOT DETECTED
B PERT DNA SPEC QL NAA+PROBE: NOT DETECTED
BACTERIA URNS QL MICRO: ABNORMAL
BILIRUB UR QL STRIP: NEGATIVE
BNP SERPL-MCNC: ABNORMAL PG/ML (ref 0–450)
BUN SERPL-MCNC: 48 MG/DL (ref 8–23)
BUN SERPL-MCNC: 53 MG/DL (ref 8–23)
C PNEUM DNA NPH QL NAA+NON-PROBE: NOT DETECTED
C3 SERPL-MCNC: 145 MG/DL (ref 90–180)
C4 SERPL-MCNC: 31 MG/DL (ref 10–40)
CALCIUM SERPL-MCNC: 9.1 MG/DL (ref 8.6–10.4)
CALCIUM SERPL-MCNC: 9.4 MG/DL (ref 8.6–10.4)
CASTS #/AREA URNS LPF: ABNORMAL /LPF (ref 0–8)
CHLORIDE SERPL-SCNC: 100 MMOL/L (ref 98–107)
CHLORIDE SERPL-SCNC: 102 MMOL/L (ref 98–107)
CHLORIDE UR-SCNC: 84 MMOL/L
CHOLEST SERPL-MCNC: 279 MG/DL (ref 0–199)
CHOLESTEROL/HDL RATIO: 5.7
CLARITY UR: CLEAR
CO2 SERPL-SCNC: 19 MMOL/L (ref 20–31)
CO2 SERPL-SCNC: 20 MMOL/L (ref 20–31)
COLOR UR: YELLOW
CREAT SERPL-MCNC: 3.7 MG/DL (ref 0.6–0.9)
CREAT SERPL-MCNC: 4.1 MG/DL (ref 0.6–0.9)
CREAT UR-MCNC: 40.4 MG/DL (ref 28–217)
EPI CELLS #/AREA URNS HPF: ABNORMAL /HPF (ref 0–5)
ERYTHROCYTE [DISTWIDTH] IN BLOOD BY AUTOMATED COUNT: 12.9 % (ref 11.8–14.4)
EST. AVERAGE GLUCOSE BLD GHB EST-MCNC: 214 MG/DL
FLUAV RNA NPH QL NAA+NON-PROBE: NOT DETECTED
FLUBV RNA NPH QL NAA+NON-PROBE: NOT DETECTED
FREE KAPPA/LAMBDA RATIO: 0.47 (ref 0.22–1.74)
GFR, ESTIMATED: 10 ML/MIN/1.73M2
GFR, ESTIMATED: 12 ML/MIN/1.73M2
GLUCOSE BLD-MCNC: 151 MG/DL (ref 65–105)
GLUCOSE BLD-MCNC: 257 MG/DL (ref 65–105)
GLUCOSE BLD-MCNC: 386 MG/DL (ref 65–105)
GLUCOSE BLD-MCNC: 450 MG/DL (ref 65–105)
GLUCOSE SERPL-MCNC: 235 MG/DL (ref 74–99)
GLUCOSE SERPL-MCNC: 501 MG/DL (ref 74–99)
GLUCOSE UR STRIP-MCNC: ABNORMAL MG/DL
HADV DNA NPH QL NAA+NON-PROBE: NOT DETECTED
HAV IGM SERPL QL IA: NONREACTIVE
HBA1C MFR BLD: 9.1 % (ref 4–6)
HBV CORE IGM SERPL QL IA: NONREACTIVE
HBV SURFACE AG SERPL QL IA: NONREACTIVE
HCOV 229E RNA NPH QL NAA+NON-PROBE: NOT DETECTED
HCOV HKU1 RNA NPH QL NAA+NON-PROBE: NOT DETECTED
HCOV NL63 RNA NPH QL NAA+NON-PROBE: NOT DETECTED
HCOV OC43 RNA NPH QL NAA+NON-PROBE: NOT DETECTED
HCT VFR BLD AUTO: 34.5 % (ref 36.3–47.1)
HCV AB SERPL QL IA: NONREACTIVE
HDLC SERPL-MCNC: 49 MG/DL
HGB BLD-MCNC: 11.1 G/DL (ref 11.9–15.1)
HGB UR QL STRIP.AUTO: ABNORMAL
HMPV RNA NPH QL NAA+NON-PROBE: NOT DETECTED
HPIV1 RNA NPH QL NAA+NON-PROBE: NOT DETECTED
HPIV2 RNA NPH QL NAA+NON-PROBE: NOT DETECTED
HPIV3 RNA NPH QL NAA+NON-PROBE: NOT DETECTED
HPIV4 RNA NPH QL NAA+NON-PROBE: NOT DETECTED
KAPPA LC FREE SER-MCNC: 37.9 MG/L
KETONES UR STRIP-MCNC: NEGATIVE MG/DL
LAMBDA LC FREE SERPL-MCNC: 79.8 MG/L (ref 4.2–27.7)
LDLC SERPL CALC-MCNC: 201 MG/DL (ref 0–100)
LEUKOCYTE ESTERASE UR QL STRIP: NEGATIVE
M PNEUMO DNA NPH QL NAA+NON-PROBE: NOT DETECTED
MAGNESIUM SERPL-MCNC: 2.5 MG/DL (ref 1.6–2.4)
MCH RBC QN AUTO: 31.3 PG (ref 25.2–33.5)
MCHC RBC AUTO-ENTMCNC: 32.2 G/DL (ref 28.4–34.8)
MCV RBC AUTO: 97.2 FL (ref 82.6–102.9)
NITRITE UR QL STRIP: NEGATIVE
NRBC BLD-RTO: 0 PER 100 WBC
PH UR STRIP: 5.5 [PH] (ref 5–8)
PHOSPHATE SERPL-MCNC: 5.6 MG/DL (ref 2.5–4.5)
PLATELET # BLD AUTO: 326 K/UL (ref 138–453)
PMV BLD AUTO: 10.6 FL (ref 8.1–13.5)
POTASSIUM SERPL-SCNC: 4.5 MMOL/L (ref 3.7–5.3)
POTASSIUM SERPL-SCNC: 5 MMOL/L (ref 3.7–5.3)
PROT UR STRIP-MCNC: ABNORMAL MG/DL
RBC # BLD AUTO: 3.55 M/UL (ref 3.95–5.11)
RBC #/AREA URNS HPF: ABNORMAL /HPF (ref 0–4)
RSV RNA NPH QL NAA+NON-PROBE: NOT DETECTED
RV+EV RNA NPH QL NAA+NON-PROBE: NOT DETECTED
SARS-COV-2 RNA NPH QL NAA+NON-PROBE: NOT DETECTED
SODIUM SERPL-SCNC: 134 MMOL/L (ref 136–145)
SODIUM SERPL-SCNC: 140 MMOL/L (ref 136–145)
SODIUM UR-SCNC: 79 MMOL/L
SP GR UR STRIP: 1.01 (ref 1–1.03)
SPECIMEN DESCRIPTION: NORMAL
TOTAL PROTEIN, URINE: 145 MG/DL
TRIGL SERPL-MCNC: 147 MG/DL
URINE TOTAL PROTEIN CREATININE RATIO: 3.59 (ref 0–0.2)
UROBILINOGEN UR STRIP-ACNC: NORMAL EU/DL (ref 0–1)
VLDLC SERPL CALC-MCNC: 29 MG/DL (ref 1–30)
WBC #/AREA URNS HPF: ABNORMAL /HPF (ref 0–5)
WBC OTHER # BLD: 11.2 K/UL (ref 3.5–11.3)

## 2025-01-25 PROCEDURE — 6370000000 HC RX 637 (ALT 250 FOR IP): Performed by: STUDENT IN AN ORGANIZED HEALTH CARE EDUCATION/TRAINING PROGRAM

## 2025-01-25 PROCEDURE — 99223 1ST HOSP IP/OBS HIGH 75: CPT | Performed by: INTERNAL MEDICINE

## 2025-01-25 PROCEDURE — 86038 ANTINUCLEAR ANTIBODIES: CPT

## 2025-01-25 PROCEDURE — 81001 URINALYSIS AUTO W/SCOPE: CPT

## 2025-01-25 PROCEDURE — 94640 AIRWAY INHALATION TREATMENT: CPT

## 2025-01-25 PROCEDURE — 80061 LIPID PANEL: CPT

## 2025-01-25 PROCEDURE — 85520 HEPARIN ASSAY: CPT

## 2025-01-25 PROCEDURE — 83516 IMMUNOASSAY NONANTIBODY: CPT

## 2025-01-25 PROCEDURE — 2500000003 HC RX 250 WO HCPCS: Performed by: NURSE PRACTITIONER

## 2025-01-25 PROCEDURE — 84155 ASSAY OF PROTEIN SERUM: CPT

## 2025-01-25 PROCEDURE — 82436 ASSAY OF URINE CHLORIDE: CPT

## 2025-01-25 PROCEDURE — 80048 BASIC METABOLIC PNL TOTAL CA: CPT

## 2025-01-25 PROCEDURE — 76770 US EXAM ABDO BACK WALL COMP: CPT

## 2025-01-25 PROCEDURE — 6360000002 HC RX W HCPCS: Performed by: NURSE PRACTITIONER

## 2025-01-25 PROCEDURE — 83521 IG LIGHT CHAINS FREE EACH: CPT

## 2025-01-25 PROCEDURE — 6370000000 HC RX 637 (ALT 250 FOR IP): Performed by: NURSE PRACTITIONER

## 2025-01-25 PROCEDURE — 84300 ASSAY OF URINE SODIUM: CPT

## 2025-01-25 PROCEDURE — 82947 ASSAY GLUCOSE BLOOD QUANT: CPT

## 2025-01-25 PROCEDURE — 83880 ASSAY OF NATRIURETIC PEPTIDE: CPT

## 2025-01-25 PROCEDURE — 99222 1ST HOSP IP/OBS MODERATE 55: CPT | Performed by: STUDENT IN AN ORGANIZED HEALTH CARE EDUCATION/TRAINING PROGRAM

## 2025-01-25 PROCEDURE — 36415 COLL VENOUS BLD VENIPUNCTURE: CPT

## 2025-01-25 PROCEDURE — 86160 COMPLEMENT ANTIGEN: CPT

## 2025-01-25 PROCEDURE — 6370000000 HC RX 637 (ALT 250 FOR IP): Performed by: PHYSICIAN ASSISTANT

## 2025-01-25 PROCEDURE — 84165 PROTEIN E-PHORESIS SERUM: CPT

## 2025-01-25 PROCEDURE — 6360000002 HC RX W HCPCS: Performed by: PHYSICIAN ASSISTANT

## 2025-01-25 PROCEDURE — 2060000000 HC ICU INTERMEDIATE R&B

## 2025-01-25 PROCEDURE — 2700000000 HC OXYGEN THERAPY PER DAY

## 2025-01-25 PROCEDURE — 84100 ASSAY OF PHOSPHORUS: CPT

## 2025-01-25 PROCEDURE — 99232 SBSQ HOSP IP/OBS MODERATE 35: CPT | Performed by: STUDENT IN AN ORGANIZED HEALTH CARE EDUCATION/TRAINING PROGRAM

## 2025-01-25 PROCEDURE — 83036 HEMOGLOBIN GLYCOSYLATED A1C: CPT

## 2025-01-25 PROCEDURE — 84156 ASSAY OF PROTEIN URINE: CPT

## 2025-01-25 PROCEDURE — 94761 N-INVAS EAR/PLS OXIMETRY MLT: CPT

## 2025-01-25 PROCEDURE — 83735 ASSAY OF MAGNESIUM: CPT

## 2025-01-25 PROCEDURE — 82570 ASSAY OF URINE CREATININE: CPT

## 2025-01-25 PROCEDURE — 71250 CT THORAX DX C-: CPT

## 2025-01-25 PROCEDURE — 85027 COMPLETE CBC AUTOMATED: CPT

## 2025-01-25 PROCEDURE — 86225 DNA ANTIBODY NATIVE: CPT

## 2025-01-25 PROCEDURE — 80074 ACUTE HEPATITIS PANEL: CPT

## 2025-01-25 RX ORDER — CARVEDILOL 6.25 MG/1
6.25 TABLET ORAL 2 TIMES DAILY WITH MEALS
Status: DISCONTINUED | OUTPATIENT
Start: 2025-01-25 | End: 2025-01-26

## 2025-01-25 RX ORDER — INSULIN GLARGINE 100 [IU]/ML
15 INJECTION, SOLUTION SUBCUTANEOUS
Status: COMPLETED | OUTPATIENT
Start: 2025-01-25 | End: 2025-01-25

## 2025-01-25 RX ADMIN — LEVALBUTEROL HYDROCHLORIDE 0.63 MG: 0.63 SOLUTION RESPIRATORY (INHALATION) at 15:49

## 2025-01-25 RX ADMIN — INSULIN LISPRO 4 UNITS: 100 INJECTION, SOLUTION INTRAVENOUS; SUBCUTANEOUS at 21:25

## 2025-01-25 RX ADMIN — INSULIN GLARGINE 15 UNITS: 100 INJECTION, SOLUTION SUBCUTANEOUS at 08:43

## 2025-01-25 RX ADMIN — INSULIN GLARGINE 5 UNITS: 100 INJECTION, SOLUTION SUBCUTANEOUS at 21:25

## 2025-01-25 RX ADMIN — ATORVASTATIN CALCIUM 40 MG: 40 TABLET, FILM COATED ORAL at 21:25

## 2025-01-25 RX ADMIN — SODIUM CHLORIDE, PRESERVATIVE FREE 10 ML: 5 INJECTION INTRAVENOUS at 08:43

## 2025-01-25 RX ADMIN — LEVALBUTEROL HYDROCHLORIDE 0.63 MG: 0.63 SOLUTION RESPIRATORY (INHALATION) at 21:09

## 2025-01-25 RX ADMIN — CARVEDILOL 6.25 MG: 6.25 TABLET, FILM COATED ORAL at 08:45

## 2025-01-25 RX ADMIN — IPRATROPIUM BROMIDE 0.5 MG: 0.5 SOLUTION RESPIRATORY (INHALATION) at 15:50

## 2025-01-25 RX ADMIN — LEVALBUTEROL HYDROCHLORIDE 0.63 MG: 0.63 SOLUTION RESPIRATORY (INHALATION) at 11:25

## 2025-01-25 RX ADMIN — ASPIRIN 81 MG 81 MG: 81 TABLET ORAL at 08:43

## 2025-01-25 RX ADMIN — INSULIN LISPRO 8 UNITS: 100 INJECTION, SOLUTION INTRAVENOUS; SUBCUTANEOUS at 11:15

## 2025-01-25 RX ADMIN — IPRATROPIUM BROMIDE 0.5 MG: 0.5 SOLUTION RESPIRATORY (INHALATION) at 11:24

## 2025-01-25 RX ADMIN — Medication 3 MG: at 21:25

## 2025-01-25 RX ADMIN — IPRATROPIUM BROMIDE 0.5 MG: 0.5 SOLUTION RESPIRATORY (INHALATION) at 09:04

## 2025-01-25 RX ADMIN — IPRATROPIUM BROMIDE 0.5 MG: 0.5 SOLUTION RESPIRATORY (INHALATION) at 21:09

## 2025-01-25 RX ADMIN — Medication 3 MG: at 00:49

## 2025-01-25 RX ADMIN — INSULIN LISPRO 8 UNITS: 100 INJECTION, SOLUTION INTRAVENOUS; SUBCUTANEOUS at 08:43

## 2025-01-25 RX ADMIN — SODIUM CHLORIDE, PRESERVATIVE FREE 10 ML: 5 INJECTION INTRAVENOUS at 16:56

## 2025-01-25 RX ADMIN — CARVEDILOL 6.25 MG: 6.25 TABLET, FILM COATED ORAL at 16:56

## 2025-01-25 RX ADMIN — GUAIFENESIN 600 MG: 600 TABLET, EXTENDED RELEASE ORAL at 21:25

## 2025-01-25 RX ADMIN — FUROSEMIDE 40 MG: 10 INJECTION, SOLUTION INTRAMUSCULAR; INTRAVENOUS at 08:43

## 2025-01-25 RX ADMIN — FUROSEMIDE 40 MG: 10 INJECTION, SOLUTION INTRAMUSCULAR; INTRAVENOUS at 16:56

## 2025-01-25 RX ADMIN — GUAIFENESIN 600 MG: 600 TABLET, EXTENDED RELEASE ORAL at 08:43

## 2025-01-25 NOTE — CONSULTS
[facility]    Consult / H&P               Today's Date: 1/25/2025  Patient Name: Elham Amado  Date of admission: 1/24/2025  7:47 PM  Patient's age: 80 y.o., 1945  Admission Dx: Congestive heart failure with unknown left ventricular ejection fraction (HCC) [I50.9]    Requesting Physician: Johanna Laura MD    Cardiac Evaluation Reason:    elevated troponin, new onset CHF       History Obtained From: patient and chart review     History of Present Illness:    This patient 80 y.o. years old with PMH significant for HTN, type II DM, CKD, urinary retention with incomplete bladder emptying, stroke/CVA with residual left-sided weakness, COPD    Presented to [place] ED with shortness of breath and wheezing, found to be in respiratory failure secondary to new onset CHF, echo done and revealed new onset cardiomyopathy with EF 25% abnormal wall motion, mild MR, mild MS, mild TR RVSP 37 mmHg, trivial localized pericardial effusion around the right ventricle without cardiac tamponade.  Patient was also found to have SHANITA on CKD creatinine 3.4 from baseline around 1.5, elevated troponin 1-1 08, elevated proBNP 16,000-> 92195, CXR with small small pleural effusion.  Concern for NSTEMI patient was started on heparin drip and cardiology consulted for elevated troponin, new onset CHF.  started on diuresis with IV Lasix 40 twice daily, nephro consulted     Upon my eval patient is VSS, HR 90s-100s, on 2 L NC, used BiPAP overnight.   mL last 24 hours, 250 mL since earlier this morning.        Past Medical History:   has a past medical history of Anxiety, Cervicalgia, Diabetes mellitus (HCC), Dorsalgia, Hemiplegia (HCC), Hypertension, Mixed hyperlipidemia, Osteoporosis, and Stroke (cerebrum) ([facility]).    Past Surgical History:   has a past surgical history that includes back surgery and knee surgery.     Home Medications:    Prior to Admission medications    Medication Sig Start Date End Date  never smoked. She has never been exposed to tobacco smoke. She has never used smokeless tobacco. She reports that she does not drink alcohol and does not use drugs.     Family History: family history includes Diabetes in an other family member; Elevated Lipids in an other family member; Stroke in an other family member.     REVIEW OF SYSTEMS:    Constitutional: Negative for fatigue, weight loss, loss of appetite   Cardiovascular: as per HPI  Respiratory: as per HPI  Gastrointestinal: Negative for abdominal pain, N/V  Genitourinary: No dysuria, trouble voiding, or hematuria.  Musculoskeletal:  No gait disturbance, No weakness or joint complaints.  Neurological: No headache, diplopia, change in muscle strength, numbness or tingling. No change in gate.   Endocrine: No temperature intolerance. No excessive thirst, fluid intake, or urination. No tremor.  Hematologic/Lymphatic: No abnormal bruising or bleeding    PHYSICAL EXAM:      BP (!) 146/92   Pulse (!) 105   Temp 98.6 °F (37 °C) (Oral)   Resp 25   Ht 1.473 m (4' 10\")   Wt 56.7 kg (125 lb)   SpO2 97%   BMI 26.13 kg/m²    Constitutional and General Appearance: alert, cooperative, in no distress   HEENT: atraumatic, normocephalic.   Respiratory:  Clear to auscultation bilaterally  Cardiovascular:  Regular S1 and S2.  No JVD  Peripheral pulses are symmetrical and full   Abdomen:   Soft, non tender   Bowel sounds present  Extremities:  No Le edema or cyanosis   Neurological:  Deferred     DATA:    EKG:   Normal sinus rhythm, no acute ST-T wave changes     ECHO: 1/24/2025    Left Ventricle: Severely reduced left ventricular systolic function. EF by 2D Simpsons Biplane is 25%. Left ventricle is smaller than normal. Mildly increased wall thickness. See diagram for wall motion findings.    Aortic Valve: Trileaflet valve. Mildly thickened cusps.    Mitral Valve: Mild annular calcification. Mildly thickened leaflets. Mild regurgitation. Mild stenosis noted. MV mean

## 2025-01-25 NOTE — PLAN OF CARE
Problem: Chronic Conditions and Co-morbidities  Goal: Patient's chronic conditions and co-morbidity symptoms are monitored and maintained or improved  Outcome: Progressing     Problem: Discharge Planning  Goal: Discharge to home or other facility with appropriate resources  Outcome: Progressing     Problem: Respiratory - Adult  Goal: Achieves optimal ventilation and oxygenation  1/25/2025 0528 by Leyda Becker, RN  Outcome: Progressing  1/25/2025 0051 by Evelyn Herron RCP  Outcome: Progressing

## 2025-01-25 NOTE — H&P
[facility]  Office: [phone]  Julian Banegas DO, Roanld Drake DO, Олег Weeks DO, José Sanchez DO, Demarco Delgado MD, Chaya Cyr MD, Tommy Madrigal MD, Ness Hernadez MD,  Waldo Jessica MD, Jose G Rice MD, Johanna Laura MD,  Minal Guerra DO, Mady Hurt MD, Fam Jiang MD, Amor Banegas DO, Mayi Moise MD,  Isaac Dickson DO, Iqra Hampton MD, Marianna Bradford MD, Munira Anderson MD, Steph Abernathy MD,  Alexis Powell MD, Heidi Boyd MD, Natan Alexis MD, Ishaan Gant MD, Rod Plasencia MD, Beverly Cooney MD, Johnnie Hurtado DO, Austyn Hill DO, Ermelinda Yanes MD,  Dakota Johnson MD, Shirley Waterhouse, CNP,  Amanda Molina CNP, Hany Mcallister, CNP,  Dina Rader, YANDY, Angelique Caruso, CNP, Sharon Chavarria, CNP, Maye Jackman CNP, Geneva Vargas, CNP, Diane Clay, CNP, Natasha Vazquez, PA-C, Yu Mejia PA-C, Samantha Castillo, CNP, Nikki Causey, CNS, Pao Fournier, CNP, Gladys Whitlock, CNP, Tracy Schwab, CNP         [facility]   IN-PATIENT SERVICE   [facility]    HISTORY AND PHYSICAL EXAMINATION            Date:   1/24/2025  Patient name:  Elham Amado  Date of admission:  1/24/2025  7:47 PM  MRN:   [id number]  Account:  [id number]  YOB: 1945  PCP:    Thee Kay MD  Room:   2027/2027-01  Code Status:    Full Code    Chief Complaint:     Shortness of breath    History Obtained From:     patient, electronic medical record    History of Present Illness:     Elham Amado is a 80 y.o. Non- / non  female who presents with shortness of breath and is admitted to the hospital for the management of Congestive heart failure with unknown left ventricular ejection fraction (HCC).  Patient has had significant for diabetes, hypertension, hyperlipidemia, and CVA.    Patient initially presented to outlying facility for worsening shortness of breath over the last 5 days.  She also had a productive cough  for cough, shortness of breath and wheezing.    Cardiovascular:  Negative for chest pain and palpitations.   Gastrointestinal:  Negative for abdominal pain, nausea and vomiting.   Genitourinary:  Negative for dysuria and hematuria.   Musculoskeletal:  Negative for myalgias.   Neurological:  Negative for dizziness, light-headedness and headaches.       Physical Exam:   BP (!) 174/92   Pulse (!) 104   Resp 21   SpO2 100%   Temp (24hrs), Av.7 °F (36.5 °C), Min:97.7 °F (36.5 °C), Max:97.7 °F (36.5 °C)    Recent Labs     25   POCGLU 313*     No intake or output data in the 24 hours ending 25    Physical Exam  Vitals reviewed.   Constitutional:       General: She is not in acute distress.     Appearance: Normal appearance. She is not ill-appearing.   HENT:      Head: Normocephalic and atraumatic.   Cardiovascular:      Rate and Rhythm: Regular rhythm. Tachycardia present.      Pulses: Normal pulses.      Heart sounds: Normal heart sounds. No murmur heard.  Pulmonary:      Effort: No respiratory distress.      Breath sounds: Wheezing present.      Comments: On NC, wheezing in lower lobes bilaterally   Musculoskeletal:      Right lower leg: No edema.      Left lower leg: No edema.   Skin:     General: Skin is warm and dry.   Neurological:      General: No focal deficit present.      Mental Status: She is alert.   Psychiatric:         Mood and Affect: Mood normal.         Behavior: Behavior normal.         Investigations:      Laboratory Testing:  Recent Results (from the past 24 hour(s))   CBC with Auto Differential    Collection Time: 25  9:40 AM   Result Value Ref Range    WBC 11.6 (H) 3.5 - 11.0 k/uL    RBC 4.15 4.0 - 5.2 m/uL    Hemoglobin 12.9 12.0 - 16.0 g/dL    Hematocrit 39.1 36 - 46 %    MCV 94.2 80 - 100 fL    MCH 31.1 26 - 34 pg    MCHC 33.0 31 - 37 g/dL    RDW 13.5 12.5 - 15.4 %    Platelets 370 140 - 450 k/uL    MPV 8.5 6.0 - 12.0 fL    Neutrophils % 60 36 - 66 %

## 2025-01-25 NOTE — PROGRESS NOTES
Calculation of ALINA-HF (European collaboration on Acute decompensated Heart Failure) score    1. Age at admission,?>=?75 years    1  2.  Peripheral edema at admission: Yes   1  3.  SBP at admission mmHg <=?115    0  4. Hyponatremia at admission mmol/L <?135   0          5.  NT-proBNP discharge value, pg/ml 7915-0712 1         5001-15,000 3       ? >?15,000 4    6. NT-proBNP reduction, %: ?<?30 (dynamic change) 1  7. Serum Urea at discharge mmol/L >=?15   1  (Equivalent BUN >=42 mg/dl)  8. NYHA class at discharge, III/IV*    1  -----------------------------------------------------------------------------------------  TOTAL:      Risk Score Groups  Low (<=2)   Intermediate (3-4)  High (5-7)   REFER TO CHF CLINIC  Very High (>=8) REFER TO CHF CLINIC    Max score is 11.    *III: Marked limitation of physical activity. Comfortable at rest. Less than ordinary activity causes fatigue, palpitation, shortness of breath or chest pain.   IV: Symptoms of heart failure at rest. Any physical activity causes further discomfort.

## 2025-01-25 NOTE — PROGRESS NOTES
Writer notified on call Sam ESCOTO, \"notified by lab, patient blood glucose 501. Gave 6units humalog and 5 units lantus overnight\". Response, \"Thank you. Recheck at 0700 please\". No new orders at this time. Writer will recheck blood glucose at 0700.

## 2025-01-25 NOTE — PROGRESS NOTES
Spiritual Health History and Assessment/Progress Note  [facility]    Initial Encounter,  ,  ,      Name: Ehlam Amado MRN: [id number]    Age: 80 y.o.     Sex: female   Language: English   Orthodoxy: Hindu   Congestive heart failure with unknown left ventricular ejection fraction (HCC)     Date: 1/25/2025            Total Time Calculated: 22 min              Spiritual Assessment began in [facility] CAR 2- STEPDOWN        Referral/Consult From: Rounding   Encounter Overview/Reason: Initial Encounter  Service Provided For: Patient and family together    Cindy, Belief, Meaning:   Patient identifies as spiritual, is connected with a cindy tradition or spiritual practice, and has beliefs or practices that help with coping during difficult times  Family/Friends identify as spiritual, are connected with a cindy tradition or spiritual practice, and have beliefs or practices that help with coping during difficult times      Importance and Influence:  Patient has spiritual/personal beliefs that influence decisions regarding their health  Family/Friends have spiritual/personal beliefs that influence decisions regarding the patient's health    Community:  Patient feels well-supported. Support system includes: Children  Family/Friends feel well-supported. Support system includes: Parent/s and Extended family    Assessment and Plan of Care:   Patient was sitting up in her chair when  visited. Family was present and receptive to spiritual care. Patient looked a bit worried and seemed to be having a rough time. Patient was raised Hindu but not affiliated with a [place]. Patient received sacrament of anointing of the sick.  provided ministry of presence, offered support and prayed with patient and family. Patient and family expressed appreciation for the anointing and blessing they received.     Patient Interventions include: Facilitated expression of thoughts and feelings, Explored spiritual

## 2025-01-25 NOTE — PROGRESS NOTES
01/25/25 0904   Care Plan - Respiratory Goals   Achieves optimal ventilation and oxygenation Assess for changes in respiratory status;Assess for changes in mentation and behavior;Position to facilitate oxygenation and minimize respiratory effort;Oxygen supplementation based on oxygen saturation or arterial blood gases;Encourage broncho-pulmonary hygiene including cough, deep breathe, incentive spirometry;Assess the need for suctioning and aspirate as needed;Assess and instruct to report shortness of breath or any respiratory difficulty;Respiratory therapy support as indicated

## 2025-01-25 NOTE — CARE COORDINATION
Case Management Assessment  Initial Evaluation    Date/Time of Evaluation: 1/25/2025 1:02 PM  Assessment Completed by: MICHOACANO AGARWAL RN    If patient is discharged prior to next notation, then this note serves as note for discharge by case management.    Patient Name: Elham Amado                   YOB: 1945  Diagnosis: Congestive heart failure with unknown left ventricular ejection fraction (HCC) [I50.9]                   Date / Time: 1/24/2025  7:47 PM    Patient Admission Status: Inpatient   Readmission Risk (Low < 19, Mod (19-27), High > 27): Readmission Risk Score: 15.7    Current PCP: Thee Kay MD  PCP verified by CM? (P) Yes (Thee Kay MD)    Chart Reviewed: Yes      History Provided by: (P) Patient, Child/Family  Patient Orientation: (P) Alert and Oriented    Patient Cognition: (P) Alert    Hospitalization in the last 30 days (Readmission):  No    If yes, Readmission Assessment in CM Navigator will be completed.    Advance Directives:      Code Status: Full Code   Patient's Primary Decision Maker is: (P) Legal Next of Kin    Primary Decision Maker: Heladio Cook - Child - [phone]    Discharge Planning:    Patient lives with: (P) Children Type of Home: (P) House  Primary Care Giver: (P) Family (sonHeladio)  Patient Support Systems include: (P) Children   Current Financial resources: (P) Medicare ([facility] Medicare)  Current community resources: (P) None  Current services prior to admission: (P) Durable Medical Equipment            Current DME: (P) Cane, Shower Chair            Type of Home Care services:  (P) None    ADLS  Prior functional level: (P) Cooking, Assistance with the following:, Shopping, Bathing  Current functional level: (P) Assistance with the following:, Mobility, Bathing    PT AM-PAC:   /24  OT AM-PAC:   /24    Family can provide assistance at DC: (P) Yes  Would you like Case Management to discuss the discharge plan with any other family  members/significant others, and if so, who? (P) Yes (SonHeladio)  Plans to Return to Present Housing: (P) Yes  Other Identified Issues/Barriers to RETURNING to current housing: none  Potential Assistance needed at discharge: Home Care            Potential DME:    Patient expects to discharge to: (P) House  Plan for transportation at discharge:      Financial    Payor: [facility] MEDICARE / Plan: [facility] MEDICARE COMPLETE / Product Type: *No Product type* /     Does insurance require precert for SNF: Yes    Potential assistance Purchasing Medications: (P) No  Meds-to-Beds request: Yes      [id number] - [place], [place] - [address] - P [phone] - F [phone]  [address] [address]  Phone: [phone] Fax: [phone]    [id number] [address] - P [phone] - F [phone]  [address] [id number]  Phone: [phone] Fax: [phone]      Notes:    Factors facilitating achievement of predicted outcomes: Family support and Cooperative    Barriers to discharge: Medical complications    Additional Case Management Notes: Home independently, lives w/ son- he cares for her 24/7, has transportation and established PCP, denies HC needs. Requesting light weight WC DME order.     The Plan for Transition of Care is related to the following treatment goals of Congestive heart failure with unknown left ventricular ejection fraction (HCC) [I50.9]    IF APPLICABLE: The Patient and/or patient representative Elham and her family were provided with a choice of provider and agrees with the discharge plan. Freedom of choice list with basic dialogue that supports the patient's individualized plan of care/goals and shares the quality data associated with the providers was provided to: (P) Patient Representative   Patient Representative Name: (P) pt's sonHeladio     The Patient and/or Patient Representative

## 2025-01-25 NOTE — PROGRESS NOTES
[facility]  Office: [phone]  Julian Banegas DO, Ronald Drake DO, Олег Weeks DO, José Sanchez DO, Demarco Delgado MD, Chaya Cyr MD, Tommy Madrigal MD, Ness Hernadez MD,  Waldo Jessica MD, Jose G Rice MD, Johanna Laura MD,  Minal Guerra DO, Mady Hurt MD, Fam Jiang MD, Amor Banegas DO, Mayi Moise MD,  Isaac Dickson DO, Iqra Hampton MD, Marianna Bradford MD, Munira Anderson MD, Steph Abernathy MD,  Alexis Powell MD, Heidi Boyd MD, Natan Alexis MD, Ishaan Gant MD, Rod Plasencia MD, Beverly Cooney MD, Hany Yañez DO, Dakota Johnson MD, Minal Boyle MD, Mohsin Reza, MD, Shirley Waterhouse, CNP,  Amanda Molina CNP, Hany Mcallister, CNP,  Dina Rader, DNP, Angelique Caruso, CNP, Sharon Chavarria, CNP, Geneva Vargas, CNP, Diane Clay, CNP, Natasha Vazquez, PA-C, uY Mejia, PA-C, Reva Aceves, CNP, Diego Kaplan, CNP,  Pili Ramos, CNP, Valarie Aguilar, CNP, Samantha Castillo, CNP,  Nikki Causey, CNS, Maye Jackman, CNP, Gladys Whitlock, CNP,   Wendy Rubio, CNP         [facility]   IN-PATIENT SERVICE   [facility]    Progress Note    1/25/2025    4:20 PM    Name:   Elham Amado  MRN:     [id number]     Acct:      [id number]   Room:   2027/2027-01  IP Day:  1  Admit Date:  1/24/2025  7:47 PM    PCP:   Thee Kay MD  Code Status:  Full Code    Subjective:     C/C: No chief complaint on file.    Interval History Status: not changed.     Pateint seen and examined. Feeling ok, still some shorntess of breath, diuresing well. Still having clear urine output.     Brief History:     80-year-old female past medical history of diabetes type 2 insulin-dependent, hyperlipidemia, hypertension, history of CVA presents from outlying facility with shortness of breath.  Concern for pneumonia and has been treated with antibiotics outpatient patient transferred to [facility] due to acute on chronic clinical systolic

## 2025-01-26 LAB
ANION GAP SERPL CALCULATED.3IONS-SCNC: 17 MMOL/L (ref 9–16)
ANTI-XA UNFRAC HEPARIN: 0.22 IU/L
ANTI-XA UNFRAC HEPARIN: 0.51 IU/L
BUN SERPL-MCNC: 69 MG/DL (ref 8–23)
CALCIUM SERPL-MCNC: 9.4 MG/DL (ref 8.6–10.4)
CHLORIDE SERPL-SCNC: 100 MMOL/L (ref 98–107)
CK SERPL-CCNC: 116 U/L (ref 26–192)
CO2 SERPL-SCNC: 15 MMOL/L (ref 20–31)
CREAT SERPL-MCNC: 4.4 MG/DL (ref 0.6–0.9)
EKG ATRIAL RATE: 100 BPM
EKG ATRIAL RATE: 119 BPM
EKG P AXIS: 23 DEGREES
EKG P AXIS: 31 DEGREES
EKG P-R INTERVAL: 174 MS
EKG P-R INTERVAL: 180 MS
EKG Q-T INTERVAL: 310 MS
EKG Q-T INTERVAL: 390 MS
EKG QRS DURATION: 88 MS
EKG QRS DURATION: 94 MS
EKG QTC CALCULATION (BAZETT): 434 MS
EKG QTC CALCULATION (BAZETT): 503 MS
EKG R AXIS: 33 DEGREES
EKG R AXIS: 37 DEGREES
EKG T AXIS: 127 DEGREES
EKG T AXIS: 88 DEGREES
EKG VENTRICULAR RATE: 100 BPM
EKG VENTRICULAR RATE: 118 BPM
ERYTHROCYTE [DISTWIDTH] IN BLOOD BY AUTOMATED COUNT: 13.1 % (ref 11.8–14.4)
GFR, ESTIMATED: 10 ML/MIN/1.73M2
GLUCOSE BLD-MCNC: 195 MG/DL (ref 65–105)
GLUCOSE BLD-MCNC: 249 MG/DL (ref 65–105)
GLUCOSE BLD-MCNC: 332 MG/DL (ref 65–105)
GLUCOSE BLD-MCNC: 396 MG/DL (ref 65–105)
GLUCOSE SERPL-MCNC: 263 MG/DL (ref 74–99)
HCT VFR BLD AUTO: 46.7 % (ref 36.3–47.1)
HGB BLD-MCNC: 13.9 G/DL (ref 11.9–15.1)
MAGNESIUM SERPL-MCNC: 2.8 MG/DL (ref 1.6–2.4)
MCH RBC QN AUTO: 31.5 PG (ref 25.2–33.5)
MCHC RBC AUTO-ENTMCNC: 29.8 G/DL (ref 28.4–34.8)
MCV RBC AUTO: 105.9 FL (ref 82.6–102.9)
MICROORGANISM SPEC CULT: NORMAL
MICROORGANISM SPEC CULT: NORMAL
NRBC BLD-RTO: 0 PER 100 WBC
PLATELET # BLD AUTO: 340 K/UL (ref 138–453)
PMV BLD AUTO: 10.3 FL (ref 8.1–13.5)
POTASSIUM SERPL-SCNC: 4.6 MMOL/L (ref 3.7–5.3)
RBC # BLD AUTO: 4.41 M/UL (ref 3.95–5.11)
SERVICE CMNT-IMP: NORMAL
SERVICE CMNT-IMP: NORMAL
SODIUM SERPL-SCNC: 132 MMOL/L (ref 136–145)
SPECIMEN DESCRIPTION: NORMAL
SPECIMEN DESCRIPTION: NORMAL
WBC OTHER # BLD: 15.1 K/UL (ref 3.5–11.3)

## 2025-01-26 PROCEDURE — 97530 THERAPEUTIC ACTIVITIES: CPT

## 2025-01-26 PROCEDURE — 6360000002 HC RX W HCPCS: Performed by: PHYSICIAN ASSISTANT

## 2025-01-26 PROCEDURE — 94761 N-INVAS EAR/PLS OXIMETRY MLT: CPT

## 2025-01-26 PROCEDURE — 97116 GAIT TRAINING THERAPY: CPT

## 2025-01-26 PROCEDURE — 6370000000 HC RX 637 (ALT 250 FOR IP): Performed by: STUDENT IN AN ORGANIZED HEALTH CARE EDUCATION/TRAINING PROGRAM

## 2025-01-26 PROCEDURE — 6370000000 HC RX 637 (ALT 250 FOR IP): Performed by: NURSE PRACTITIONER

## 2025-01-26 PROCEDURE — 93010 ELECTROCARDIOGRAM REPORT: CPT | Performed by: INTERNAL MEDICINE

## 2025-01-26 PROCEDURE — 36415 COLL VENOUS BLD VENIPUNCTURE: CPT

## 2025-01-26 PROCEDURE — 80048 BASIC METABOLIC PNL TOTAL CA: CPT

## 2025-01-26 PROCEDURE — 82550 ASSAY OF CK (CPK): CPT

## 2025-01-26 PROCEDURE — 2580000003 HC RX 258: Performed by: PHYSICIAN ASSISTANT

## 2025-01-26 PROCEDURE — 83735 ASSAY OF MAGNESIUM: CPT

## 2025-01-26 PROCEDURE — 2500000003 HC RX 250 WO HCPCS: Performed by: NURSE PRACTITIONER

## 2025-01-26 PROCEDURE — 94640 AIRWAY INHALATION TREATMENT: CPT

## 2025-01-26 PROCEDURE — 99233 SBSQ HOSP IP/OBS HIGH 50: CPT | Performed by: STUDENT IN AN ORGANIZED HEALTH CARE EDUCATION/TRAINING PROGRAM

## 2025-01-26 PROCEDURE — 99232 SBSQ HOSP IP/OBS MODERATE 35: CPT | Performed by: STUDENT IN AN ORGANIZED HEALTH CARE EDUCATION/TRAINING PROGRAM

## 2025-01-26 PROCEDURE — 99233 SBSQ HOSP IP/OBS HIGH 50: CPT | Performed by: NURSE PRACTITIONER

## 2025-01-26 PROCEDURE — 85027 COMPLETE CBC AUTOMATED: CPT

## 2025-01-26 PROCEDURE — 85520 HEPARIN ASSAY: CPT

## 2025-01-26 PROCEDURE — 2060000000 HC ICU INTERMEDIATE R&B

## 2025-01-26 PROCEDURE — 82947 ASSAY GLUCOSE BLOOD QUANT: CPT

## 2025-01-26 PROCEDURE — 6360000002 HC RX W HCPCS: Performed by: NURSE PRACTITIONER

## 2025-01-26 PROCEDURE — 2700000000 HC OXYGEN THERAPY PER DAY

## 2025-01-26 PROCEDURE — 2500000003 HC RX 250 WO HCPCS: Performed by: PHYSICIAN ASSISTANT

## 2025-01-26 PROCEDURE — 6370000000 HC RX 637 (ALT 250 FOR IP): Performed by: PHYSICIAN ASSISTANT

## 2025-01-26 PROCEDURE — 97162 PT EVAL MOD COMPLEX 30 MIN: CPT

## 2025-01-26 RX ORDER — CARVEDILOL 12.5 MG/1
12.5 TABLET ORAL 2 TIMES DAILY WITH MEALS
Status: DISCONTINUED | OUTPATIENT
Start: 2025-01-26 | End: 2025-02-03

## 2025-01-26 RX ORDER — LEVALBUTEROL INHALATION SOLUTION 0.63 MG/3ML
0.63 SOLUTION RESPIRATORY (INHALATION)
Status: DISCONTINUED | OUTPATIENT
Start: 2025-01-26 | End: 2025-01-26

## 2025-01-26 RX ORDER — AMLODIPINE BESYLATE 5 MG/1
5 TABLET ORAL DAILY
Status: DISCONTINUED | OUTPATIENT
Start: 2025-01-26 | End: 2025-01-27

## 2025-01-26 RX ORDER — LEVALBUTEROL INHALATION SOLUTION 0.63 MG/3ML
0.63 SOLUTION RESPIRATORY (INHALATION)
Status: DISCONTINUED | OUTPATIENT
Start: 2025-01-26 | End: 2025-02-05 | Stop reason: HOSPADM

## 2025-01-26 RX ORDER — INSULIN GLARGINE 100 [IU]/ML
10 INJECTION, SOLUTION SUBCUTANEOUS 2 TIMES DAILY
Status: DISCONTINUED | OUTPATIENT
Start: 2025-01-26 | End: 2025-02-05

## 2025-01-26 RX ADMIN — GUAIFENESIN 600 MG: 600 TABLET, EXTENDED RELEASE ORAL at 07:59

## 2025-01-26 RX ADMIN — FUROSEMIDE 40 MG: 10 INJECTION, SOLUTION INTRAMUSCULAR; INTRAVENOUS at 07:59

## 2025-01-26 RX ADMIN — CARVEDILOL 6.25 MG: 6.25 TABLET, FILM COATED ORAL at 07:59

## 2025-01-26 RX ADMIN — GUAIFENESIN 600 MG: 600 TABLET, EXTENDED RELEASE ORAL at 22:19

## 2025-01-26 RX ADMIN — INSULIN LISPRO 8 UNITS: 100 INJECTION, SOLUTION INTRAVENOUS; SUBCUTANEOUS at 12:05

## 2025-01-26 RX ADMIN — IPRATROPIUM BROMIDE 0.5 MG: 0.5 SOLUTION RESPIRATORY (INHALATION) at 22:01

## 2025-01-26 RX ADMIN — INSULIN GLARGINE 10 UNITS: 100 INJECTION, SOLUTION SUBCUTANEOUS at 22:19

## 2025-01-26 RX ADMIN — SODIUM CHLORIDE, PRESERVATIVE FREE 10 ML: 5 INJECTION INTRAVENOUS at 16:51

## 2025-01-26 RX ADMIN — HEPARIN SODIUM 14 UNITS/KG/HR: 10000 INJECTION, SOLUTION INTRAVENOUS at 10:25

## 2025-01-26 RX ADMIN — INSULIN LISPRO 6 UNITS: 100 INJECTION, SOLUTION INTRAVENOUS; SUBCUTANEOUS at 16:51

## 2025-01-26 RX ADMIN — CARVEDILOL 12.5 MG: 12.5 TABLET, FILM COATED ORAL at 15:51

## 2025-01-26 RX ADMIN — INSULIN GLARGINE 10 UNITS: 100 INJECTION, SOLUTION SUBCUTANEOUS at 12:13

## 2025-01-26 RX ADMIN — IPRATROPIUM BROMIDE 0.5 MG: 0.5 SOLUTION RESPIRATORY (INHALATION) at 09:29

## 2025-01-26 RX ADMIN — SODIUM CHLORIDE, PRESERVATIVE FREE 10 ML: 5 INJECTION INTRAVENOUS at 22:19

## 2025-01-26 RX ADMIN — LEVALBUTEROL HYDROCHLORIDE 0.63 MG: 0.63 SOLUTION RESPIRATORY (INHALATION) at 22:01

## 2025-01-26 RX ADMIN — AZITHROMYCIN MONOHYDRATE 500 MG: 500 INJECTION, POWDER, LYOPHILIZED, FOR SOLUTION INTRAVENOUS at 15:51

## 2025-01-26 RX ADMIN — HEPARIN SODIUM 1500 UNITS: 1000 INJECTION INTRAVENOUS; SUBCUTANEOUS at 10:31

## 2025-01-26 RX ADMIN — SODIUM CHLORIDE, PRESERVATIVE FREE 10 ML: 5 INJECTION INTRAVENOUS at 08:01

## 2025-01-26 RX ADMIN — ASPIRIN 81 MG 81 MG: 81 TABLET ORAL at 07:59

## 2025-01-26 RX ADMIN — AMLODIPINE BESYLATE 5 MG: 5 TABLET ORAL at 10:33

## 2025-01-26 RX ADMIN — WATER 1000 MG: 1 INJECTION INTRAMUSCULAR; INTRAVENOUS; SUBCUTANEOUS at 00:24

## 2025-01-26 RX ADMIN — FUROSEMIDE 40 MG: 10 INJECTION, SOLUTION INTRAMUSCULAR; INTRAVENOUS at 19:08

## 2025-01-26 RX ADMIN — LEVALBUTEROL HYDROCHLORIDE 0.63 MG: 0.63 SOLUTION RESPIRATORY (INHALATION) at 09:29

## 2025-01-26 RX ADMIN — Medication 3 MG: at 22:19

## 2025-01-26 RX ADMIN — ATORVASTATIN CALCIUM 40 MG: 40 TABLET, FILM COATED ORAL at 22:19

## 2025-01-26 RX ADMIN — SODIUM CHLORIDE, PRESERVATIVE FREE 10 ML: 5 INJECTION INTRAVENOUS at 00:26

## 2025-01-26 NOTE — PLAN OF CARE
Problem: Respiratory - Adult  Goal: Achieves optimal ventilation and oxygenation  1/26/2025 1704 by Elidia Carroll, SALVADOR  Outcome: Progressing  Achieves optimal ventilation and oxygenation:   Assess for changes in respiratory status   Assess for changes in mentation and behavior   Position to facilitate oxygenation and minimize respiratory effort   Oxygen supplementation based on oxygen saturation or arterial blood gases   Encourage broncho-pulmonary hygiene including cough, deep breathe, incentive spirometry   Assess and instruct to report shortness of breath or any respiratory difficulty   Respiratory therapy support as indicated

## 2025-01-26 NOTE — PLAN OF CARE
Problem: Chronic Conditions and Co-morbidities  Goal: Patient's chronic conditions and co-morbidity symptoms are monitored and maintained or improved  Outcome: Progressing     Problem: Discharge Planning  Goal: Discharge to home or other facility with appropriate resources  Outcome: Progressing     Problem: Respiratory - Adult  Goal: Achieves optimal ventilation and oxygenation  1/26/2025 0659 by Wilma Glasgow RN  Outcome: Progressing  1/25/2025 2114 by Evelyn Herron RCP  Outcome: Progressing  Flowsheets (Taken 1/25/2025 0904 by Kathy Todd RCP)  Achieves optimal ventilation and oxygenation:   Assess for changes in respiratory status   Assess for changes in mentation and behavior   Position to facilitate oxygenation and minimize respiratory effort   Oxygen supplementation based on oxygen saturation or arterial blood gases   Encourage broncho-pulmonary hygiene including cough, deep breathe, incentive spirometry   Assess the need for suctioning and aspirate as needed   Assess and instruct to report shortness of breath or any respiratory difficulty   Respiratory therapy support as indicated     Problem: Safety - Adult  Goal: Free from fall injury  Outcome: Progressing     Problem: ABCDS Injury Assessment  Goal: Absence of physical injury  Outcome: Progressing

## 2025-01-26 NOTE — PROGRESS NOTES
Renal Progress Note    Patient :  Elham Amado; 80 y.o. MRN# [id number]  Location:  2027/2027-01  Attending:  Johanna Laura MD  Admit Date:  1/24/2025   Hospital Day: 2    Subjective:     Patient seen and examined at bedside, he is significantly more drowsy and less interactive than day prior.  Vital signs reveal hypertension with most recent blood pressure 172/86.  Hyperglycemia continues to be an issue with most recent blood glucose 249.  Patient is maintained on heparin drip, cardiology would like to perform cardiac cath within the next 24 to 48 hours.    Urine output documented at 2100 cc over 24 hours.  Patient is maintained on 40 mg IV Lasix twice daily.    CT chest performed yesterday morning demonstrated probable mild CHF related changes with cardiomegaly/four-chamber cardiac enlargement, interstitial prominence over the lower lung zones and groundglass opacities.  Mild consolidative groundglass opacity noted to the posterior right upper lobe, small bilateral pleural effusions.    Labs reviewed.  Recent Labs     01/25/25  0350 01/25/25  1351 01/26/25  0806   * 140 132*   K 5.0 4.5 4.6    102 100   CO2 19* 20 15*   BUN 48* 53* 69*   CREATININE 3.7* 4.1* 4.4*   GLUCOSE 501* 235* 263*   CALCIUM 9.1 9.4 9.4     Hgb 13.9    Renal workup reviewed.  Renal ultrasound demonstrates right kidney measuring 8.9 cm, left kidney measuring 8.3 cm, increased cortical echogenicity bilaterally, small cyst noted along the upper pole of the right kidney, no hydronephrosis or intrarenal stones.     Urine chemistry reviewed, urine chloride 84, urine sodium 79, UPC 3.59.    Serologies reviewed, SPEP pending, free light chain ratio 0.47, complements within normal limits, hepatitis panel nonreactive.    Brief History reviewed.  80-year-old female with past medical history of CKD stage IV, uncontrolled type 2 diabetes, hypertension, hyperlipidemia, and CVA who presented to the hospital with chief complaint of

## 2025-01-26 NOTE — PROGRESS NOTES
[facility]   Progress Note                   Date:   1/26/2025  Patient name: Elham Amado  Date of admission:  1/24/2025  7:47 PM  MRN:   [id number]  YOB: 1945  PCP: Thee Kay MD    Reason for Admission: Congestive heart failure with unknown left ventricular ejection fraction (HCC) [I50.9]    Subjective:       Clinical Changes / Abnormalities:Pt seen and examined in the room.  Pt denies any CP but states that she gets sob at times.  Family in room.  Pt up to chair .  Labs, vitals and tele reviewed-        Medications:   Scheduled Meds:   carvedilol  6.25 mg Oral BID WC    sodium chloride flush  5-40 mL IntraVENous 2 times per day    [Held by provider] lisinopril  5 mg Oral Daily    furosemide  40 mg IntraVENous BID    insulin lispro  0-8 Units SubCUTAneous 4x Daily AC & HS    aspirin  81 mg Oral Daily    atorvastatin  40 mg Oral Nightly    cefTRIAXone (ROCEPHIN) IV  1,000 mg IntraVENous Q24H    azithromycin  500 mg IntraVENous Q24H    guaiFENesin  600 mg Oral BID    insulin glargine  5 Units SubCUTAneous Nightly    levalbuterol  0.63 mg Nebulization Q4H While awake    ipratropium  0.5 mg Nebulization Q4H While awake     Continuous Infusions:   sodium chloride      dextrose      heparin (PORCINE) Infusion 12 Units/kg/hr (01/25/25 0428)     CBC:   Recent Labs     01/24/25  0940 01/25/25  0350 01/26/25  0806   WBC 11.6* 11.2 15.1*   HGB 12.9 11.1* 13.9    326 340     BMP:    Recent Labs     01/25/25  0350 01/25/25  1351 01/26/25  0806   * 140 132*   K 5.0 4.5 4.6    102 100   CO2 19* 20 15*   BUN 48* 53* 69*   CREATININE 3.7* 4.1* 4.4*   GLUCOSE 501* 235* 263*     Hepatic:   Recent Labs     01/24/25  2043   AST 18   ALT <5*   BILITOT 0.3   ALKPHOS 114*     Troponin:   Recent Labs     01/24/25  0940 01/24/25  1104 01/24/25  1550   TROPHS 91* 108* 106*     BNP: No results for input(s): \"BNP\" in the last 72 hours.  Lipids:   Recent Labs     01/25/25  4972

## 2025-01-26 NOTE — PROGRESS NOTES
Physical Therapy  Facility/Department: [facility] CAR 2- STEPDOWN   Physical Therapy Initial Evaluation    Patient Name: Elham Amado        MRN: [id number]    : 1945    Date of Service: 2025    Past Medical History:  has a past medical history of Anxiety, Cervicalgia, Diabetes mellitus (HCC), Dorsalgia, Hemiplegia (HCC), Hypertension, Mixed hyperlipidemia, Osteoporosis, and Stroke (cerebrum) ([facility]).  Past Surgical History:  has a past surgical history that includes back surgery and knee surgery.    Discharge Recommendations   Further therapy recommended at discharge.    PT Equipment Recommendations  Equipment Needed: No    Assessment  Body Structures, Functions, Activity Limitations Requiring Skilled Therapeutic Intervention: Decreased functional mobility , Decreased strength, Decreased endurance, Decreased balance, Decreased ROM  Assessment: Pt amb 30' CGA SBQC. At baseline pt is a household distance ambulator w/out frequent use of AD. Pt does present w/ instability w/out AD, advised pt and family to use SBQC upon discharge to reduce fall risk. 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 Educated on compensatory heel insert in shoe to offset contracture and reduce risk of falls. Son states pt uses seated cycling machine daily, has not recieved therapy services recently though son is adamant that pt would benefit from physical therapy. Pt would benefit from continued acute PT to address deficits.  Therapy Prognosis: Good  Decision Making: Medium Complexity  Requires PT Follow-Up: Yes  Activity Tolerance  Activity Tolerance: Patient limited by endurance, Patient limited by fatigue  Safety Devices  Type of Devices: All fall risk precautions in place, Patient at risk for falls, Call light within reach, Gait belt, Nurse notified, Left in chair  Restraints  Restraints Initially in Place: No    AM-PAC  AM-PAC Basic Mobility - Inpatient   How much help is needed  Yes

## 2025-01-26 NOTE — PROGRESS NOTES
[facility]  Office: [phone]  Julian Banegas DO, Ronald Drake DO, Олег Weeks DO, José Sanchez DO, Demarco Delgado MD, Chaya Cyr MD, Tommy Madrigal MD, Ness Hernadez MD,  Waldo Jessica MD, Jose G Rice MD, Johanna Laura MD,  Minal Guerra DO, Mady Hurt MD, Fam Jiang MD, Amor Banegas DO, Mayi Moise MD,  Isaac Dickson DO, Iqra Hampton MD, Marianna Bradford MD, Munira Anderson MD, Steph Abernathy MD,  Alexis Powell MD, Heidi Boyd MD, Natan Alexis MD, Ishaan Gant MD, Rod Plasencia MD, Beverly Cooney MD, Hany Yañez DO, Dakota Johnson MD, Minal Boyle MD, Mohsin Reza, MD, Shirley Waterhouse, CNP,  Amanda Molina CNP, Hany Mcallister, CNP,  Dina Rader, DNP, Angelique Caruso, CNP, Sharon Chavarria, CNP, Geneva Vargas, CNP, Diane Clay, CNP, Natasha Vazquez, PA-C, Yu Mejia, PA-C, Reva Aceves, CNP, Diego Kaplan, CNP,  Pili Ramos, CNP, Valarie Aguilar, CNP, Samantha Castillo, CNP,  Nikki Causey, CNS, Maye Jackman, CNP, Gladys Whitlock, CNP,   Wendy Rubio, CNP         [facility]   IN-PATIENT SERVICE   [facility]    Progress Note    1/26/2025    2:00 PM    Name:   Elham Amado  MRN:     [id number]     Acct:      [id number]   Room:   2027/2027-01  IP Day:  2  Admit Date:  1/24/2025  7:47 PM    PCP:   Thee Kay MD  Code Status:  DNR-CCA    Subjective:     C/C: No chief complaint on file.    Interval History Status: not changed.     Pateint seen and examined sitting in chair.  No acute issues overnight.  Glucose slightly elevated.  Diuresing with IV Lasix.  Renal function still not improving.    Brief History:     80-year-old female past medical history of diabetes type 2 insulin-dependent, hyperlipidemia, hypertension, history of CVA presents from outlying facility with shortness of breath.  Concern for pneumonia and has been treated with antibiotics outpatient patient transferred to [facility] due to

## 2025-01-26 NOTE — PLAN OF CARE
Problem: Respiratory - Adult  Goal: Achieves optimal ventilation and oxygenation  Outcome: Progressing  BRONCHOSPASM/BRONCHOCONSTRICTION     [x]         IMPROVE AERATION/BREATH SOUNDS  [x]   ADMINISTER BRONCHODILATOR THERAPY AS APPROPRIATE  [x]   ASSESS BREATH SOUNDS  []   IMPLEMENT AEROSOL/MDI PROTOCOL  [x]   PATIENT EDUCATION AS NEEDED  PROVIDE ADEQUATE OXYGENATION WITH ACCEPTABLE SP02/ABG'S    [x]  IDENTIFY APPROPRIATE OXYGEN THERAPY  [x]   MONITOR SP02/ABG'S AS NEEDED   [x]   PATIENT EDUCATION AS NEEDED

## 2025-01-27 ENCOUNTER — APPOINTMENT (OUTPATIENT)
Dept: DIALYSIS | Age: 80
DRG: 280 | End: 2025-01-27
Attending: STUDENT IN AN ORGANIZED HEALTH CARE EDUCATION/TRAINING PROGRAM
Payer: MEDICARE

## 2025-01-27 ENCOUNTER — APPOINTMENT (OUTPATIENT)
Dept: INTERVENTIONAL RADIOLOGY/VASCULAR | Age: 80
DRG: 280 | End: 2025-01-27
Attending: STUDENT IN AN ORGANIZED HEALTH CARE EDUCATION/TRAINING PROGRAM
Payer: MEDICARE

## 2025-01-27 LAB
ALBUMIN PERCENT: 55 % (ref 45–65)
ALBUMIN SERPL-MCNC: 3.6 G/DL (ref 3.2–5.2)
ALPHA 2 PERCENT: 14 % (ref 6–13)
ALPHA1 GLOB SERPL ELPH-MCNC: 0.3 G/DL (ref 0.1–0.4)
ALPHA1 GLOB SERPL ELPH-MCNC: 5 % (ref 3–6)
ALPHA2 GLOB SERPL ELPH-MCNC: 0.9 G/DL (ref 0.5–0.9)
ANA SER QL IA: NEGATIVE
ANCA MYELOPEROXIDASE: <0.3 AU/ML (ref 0–3.5)
ANCA PROTEINASE 3: <0.7 AU/ML (ref 0–2)
ANION GAP SERPL CALCULATED.3IONS-SCNC: 17 MMOL/L (ref 9–16)
ANTI-XA UNFRAC HEPARIN: 0.43 IU/L
ANTI-XA UNFRAC HEPARIN: 0.57 IU/L
B-GLOBULIN SERPL ELPH-MCNC: 0.9 G/DL (ref 0.5–1.1)
B-GLOBULIN SERPL ELPH-MCNC: 13 % (ref 11–19)
BNP SERPL-MCNC: ABNORMAL PG/ML (ref 0–450)
BUN SERPL-MCNC: 78 MG/DL (ref 8–23)
CALCIUM SERPL-MCNC: 9 MG/DL (ref 8.6–10.4)
CHLORIDE SERPL-SCNC: 99 MMOL/L (ref 98–107)
CO2 SERPL-SCNC: 21 MMOL/L (ref 20–31)
CREAT SERPL-MCNC: 4.2 MG/DL (ref 0.6–0.9)
DSDNA IGG SER QL IA: <0.5 IU/ML
ERYTHROCYTE [DISTWIDTH] IN BLOOD BY AUTOMATED COUNT: 12.8 % (ref 11.8–14.4)
GAMMA GLOB SERPL ELPH-MCNC: 0.9 G/DL (ref 0.5–1.5)
GAMMA GLOBULIN %: 13 % (ref 9–20)
GFR, ESTIMATED: 10 ML/MIN/1.73M2
GLUCOSE BLD-MCNC: 190 MG/DL (ref 65–105)
GLUCOSE BLD-MCNC: 268 MG/DL (ref 65–105)
GLUCOSE SERPL-MCNC: 273 MG/DL (ref 74–99)
HCT VFR BLD AUTO: 36.5 % (ref 36.3–47.1)
HGB BLD-MCNC: 12.1 G/DL (ref 11.9–15.1)
INR PPP: 1
MAGNESIUM SERPL-MCNC: 2.4 MG/DL (ref 1.6–2.4)
MCH RBC QN AUTO: 31.6 PG (ref 25.2–33.5)
MCHC RBC AUTO-ENTMCNC: 33.2 G/DL (ref 28.4–34.8)
MCV RBC AUTO: 95.3 FL (ref 82.6–102.9)
NRBC BLD-RTO: 0 PER 100 WBC
NUCLEAR IGG SER IA-RTO: 0.2 U/ML
PATHOLOGIST: ABNORMAL
PLATELET # BLD AUTO: 368 K/UL (ref 138–453)
PMV BLD AUTO: 10.8 FL (ref 8.1–13.5)
POTASSIUM SERPL-SCNC: 4.2 MMOL/L (ref 3.7–5.3)
PROT PATTERN SERPL ELPH-IMP: ABNORMAL
PROT SERPL-MCNC: 6.6 G/DL (ref 6.6–8.7)
PROTHROMBIN TIME: 12.6 SEC (ref 11.7–14.9)
RBC # BLD AUTO: 3.83 M/UL (ref 3.95–5.11)
SODIUM SERPL-SCNC: 137 MMOL/L (ref 136–145)
TOTAL PROT. SUM,%: 100 % (ref 98–102)
TOTAL PROT. SUM: 6.6 G/DL (ref 6.3–8.2)
WBC OTHER # BLD: 10.6 K/UL (ref 3.5–11.3)

## 2025-01-27 PROCEDURE — 6370000000 HC RX 637 (ALT 250 FOR IP): Performed by: STUDENT IN AN ORGANIZED HEALTH CARE EDUCATION/TRAINING PROGRAM

## 2025-01-27 PROCEDURE — 6370000000 HC RX 637 (ALT 250 FOR IP): Performed by: PHYSICIAN ASSISTANT

## 2025-01-27 PROCEDURE — 6360000002 HC RX W HCPCS: Performed by: INTERNAL MEDICINE

## 2025-01-27 PROCEDURE — 82947 ASSAY GLUCOSE BLOOD QUANT: CPT

## 2025-01-27 PROCEDURE — 2700000000 HC OXYGEN THERAPY PER DAY

## 2025-01-27 PROCEDURE — 36556 INSERT NON-TUNNEL CV CATH: CPT

## 2025-01-27 PROCEDURE — 6370000000 HC RX 637 (ALT 250 FOR IP): Performed by: NURSE PRACTITIONER

## 2025-01-27 PROCEDURE — 02HV33Z INSERTION OF INFUSION DEVICE INTO SUPERIOR VENA CAVA, PERCUTANEOUS APPROACH: ICD-10-PCS | Performed by: PHYSICIAN ASSISTANT

## 2025-01-27 PROCEDURE — 51702 INSERT TEMP BLADDER CATH: CPT

## 2025-01-27 PROCEDURE — 99232 SBSQ HOSP IP/OBS MODERATE 35: CPT | Performed by: STUDENT IN AN ORGANIZED HEALTH CARE EDUCATION/TRAINING PROGRAM

## 2025-01-27 PROCEDURE — 6360000002 HC RX W HCPCS: Performed by: PHYSICIAN ASSISTANT

## 2025-01-27 PROCEDURE — 36415 COLL VENOUS BLD VENIPUNCTURE: CPT

## 2025-01-27 PROCEDURE — 6360000002 HC RX W HCPCS: Performed by: NURSE PRACTITIONER

## 2025-01-27 PROCEDURE — 77001 FLUOROGUIDE FOR VEIN DEVICE: CPT

## 2025-01-27 PROCEDURE — 5A1D70Z PERFORMANCE OF URINARY FILTRATION, INTERMITTENT, LESS THAN 6 HOURS PER DAY: ICD-10-PCS | Performed by: INTERNAL MEDICINE

## 2025-01-27 PROCEDURE — 99233 SBSQ HOSP IP/OBS HIGH 50: CPT | Performed by: NURSE PRACTITIONER

## 2025-01-27 PROCEDURE — 85027 COMPLETE CBC AUTOMATED: CPT

## 2025-01-27 PROCEDURE — 90935 HEMODIALYSIS ONE EVALUATION: CPT

## 2025-01-27 PROCEDURE — 83735 ASSAY OF MAGNESIUM: CPT

## 2025-01-27 PROCEDURE — 99232 SBSQ HOSP IP/OBS MODERATE 35: CPT | Performed by: INTERNAL MEDICINE

## 2025-01-27 PROCEDURE — C1769 GUIDE WIRE: HCPCS

## 2025-01-27 PROCEDURE — 36558 INSERT TUNNELED CV CATH: CPT

## 2025-01-27 PROCEDURE — 80048 BASIC METABOLIC PNL TOTAL CA: CPT

## 2025-01-27 PROCEDURE — 85520 HEPARIN ASSAY: CPT

## 2025-01-27 PROCEDURE — 94640 AIRWAY INHALATION TREATMENT: CPT

## 2025-01-27 PROCEDURE — 76937 US GUIDE VASCULAR ACCESS: CPT

## 2025-01-27 PROCEDURE — 83880 ASSAY OF NATRIURETIC PEPTIDE: CPT

## 2025-01-27 PROCEDURE — 2500000003 HC RX 250 WO HCPCS: Performed by: PHYSICIAN ASSISTANT

## 2025-01-27 PROCEDURE — 2500000003 HC RX 250 WO HCPCS: Performed by: NURSE PRACTITIONER

## 2025-01-27 PROCEDURE — 85610 PROTHROMBIN TIME: CPT

## 2025-01-27 PROCEDURE — 2060000000 HC ICU INTERMEDIATE R&B

## 2025-01-27 RX ORDER — HEPARIN SODIUM 1000 [USP'U]/ML
1600 INJECTION, SOLUTION INTRAVENOUS; SUBCUTANEOUS PRN
Status: DISCONTINUED | OUTPATIENT
Start: 2025-01-27 | End: 2025-02-05 | Stop reason: HOSPADM

## 2025-01-27 RX ORDER — HEPARIN SODIUM 1000 [USP'U]/ML
INJECTION, SOLUTION INTRAVENOUS; SUBCUTANEOUS PRN
Status: COMPLETED | OUTPATIENT
Start: 2025-01-27 | End: 2025-01-27

## 2025-01-27 RX ORDER — AMLODIPINE BESYLATE 5 MG/1
5 TABLET ORAL 2 TIMES DAILY
Status: DISCONTINUED | OUTPATIENT
Start: 2025-01-27 | End: 2025-02-05

## 2025-01-27 RX ADMIN — INSULIN GLARGINE 10 UNITS: 100 INJECTION, SOLUTION SUBCUTANEOUS at 20:37

## 2025-01-27 RX ADMIN — HEPARIN SODIUM 1600 UNITS: 1000 INJECTION INTRAVENOUS; SUBCUTANEOUS at 17:56

## 2025-01-27 RX ADMIN — INSULIN LISPRO 4 UNITS: 100 INJECTION, SOLUTION INTRAVENOUS; SUBCUTANEOUS at 08:18

## 2025-01-27 RX ADMIN — CARVEDILOL 12.5 MG: 12.5 TABLET, FILM COATED ORAL at 08:17

## 2025-01-27 RX ADMIN — SODIUM CHLORIDE, PRESERVATIVE FREE 10 ML: 5 INJECTION INTRAVENOUS at 20:37

## 2025-01-27 RX ADMIN — GUAIFENESIN 600 MG: 600 TABLET, EXTENDED RELEASE ORAL at 20:36

## 2025-01-27 RX ADMIN — IPRATROPIUM BROMIDE 0.5 MG: 0.5 SOLUTION RESPIRATORY (INHALATION) at 21:04

## 2025-01-27 RX ADMIN — LEVALBUTEROL HYDROCHLORIDE 0.63 MG: 0.63 SOLUTION RESPIRATORY (INHALATION) at 08:12

## 2025-01-27 RX ADMIN — AMLODIPINE BESYLATE 5 MG: 5 TABLET ORAL at 20:36

## 2025-01-27 RX ADMIN — HEPARIN SODIUM 1600 UNITS: 1000 INJECTION INTRAVENOUS; SUBCUTANEOUS at 17:57

## 2025-01-27 RX ADMIN — GUAIFENESIN 600 MG: 600 TABLET, EXTENDED RELEASE ORAL at 08:17

## 2025-01-27 RX ADMIN — HEPARIN SODIUM 14 UNITS/KG/HR: 10000 INJECTION, SOLUTION INTRAVENOUS at 14:14

## 2025-01-27 RX ADMIN — INSULIN GLARGINE 10 UNITS: 100 INJECTION, SOLUTION SUBCUTANEOUS at 08:17

## 2025-01-27 RX ADMIN — INSULIN LISPRO 2 UNITS: 100 INJECTION, SOLUTION INTRAVENOUS; SUBCUTANEOUS at 20:36

## 2025-01-27 RX ADMIN — FUROSEMIDE 40 MG: 10 INJECTION, SOLUTION INTRAMUSCULAR; INTRAVENOUS at 18:57

## 2025-01-27 RX ADMIN — FUROSEMIDE 40 MG: 10 INJECTION, SOLUTION INTRAMUSCULAR; INTRAVENOUS at 08:17

## 2025-01-27 RX ADMIN — HEPARIN SODIUM 1600 UNITS: 1000 INJECTION, SOLUTION INTRAVENOUS; SUBCUTANEOUS at 11:10

## 2025-01-27 RX ADMIN — CARVEDILOL 12.5 MG: 12.5 TABLET, FILM COATED ORAL at 18:56

## 2025-01-27 RX ADMIN — LEVALBUTEROL HYDROCHLORIDE 0.63 MG: 0.63 SOLUTION RESPIRATORY (INHALATION) at 21:04

## 2025-01-27 RX ADMIN — Medication 3 MG: at 23:40

## 2025-01-27 RX ADMIN — AMLODIPINE BESYLATE 5 MG: 5 TABLET ORAL at 08:17

## 2025-01-27 RX ADMIN — WATER 1000 MG: 1 INJECTION INTRAMUSCULAR; INTRAVENOUS; SUBCUTANEOUS at 00:43

## 2025-01-27 RX ADMIN — IPRATROPIUM BROMIDE 0.5 MG: 0.5 SOLUTION RESPIRATORY (INHALATION) at 08:12

## 2025-01-27 RX ADMIN — ATORVASTATIN CALCIUM 40 MG: 40 TABLET, FILM COATED ORAL at 20:36

## 2025-01-27 NOTE — PLAN OF CARE
Problem: Respiratory - Adult  Goal: Achieves optimal ventilation and oxygenation  1/26/2025 2209 by Case Crawford RCP  Outcome: Progressing   NON INVASIVE VENTILATION  PROVIDE OPTIMAL VENTILATION/ACCEPTABLE SP02  IMPLEMENT NON INVASIVE VENTILATION PROTOCOL  ASSESSMENT SKIN INTEGRITY  PATIENT EDUCATION AS NEEDED  BIPAP AS NEEDEDBRONCHOSPASM/BRONCHOCONSTRICTION     [x]         IMPROVE AERATION/BREATH SOUNDS  [x]   ADMINISTER BRONCHODILATOR THERAPY AS APPROPRIATE  [x]   ASSESS BREATH SOUNDS  []   IMPLEMENT AEROSOL/MDI PROTOCOL  [x]   PATIENT EDUCATION AS NEEDED

## 2025-01-27 NOTE — PLAN OF CARE
Problem: Chronic Conditions and Co-morbidities  Goal: Patient's chronic conditions and co-morbidity symptoms are monitored and maintained or improved  Outcome: Progressing  Flowsheets (Taken 1/26/2025 2000)  Care Plan - Patient's Chronic Conditions and Co-Morbidity Symptoms are Monitored and Maintained or Improved: Monitor and assess patient's chronic conditions and comorbid symptoms for stability, deterioration, or improvement     Problem: Discharge Planning  Goal: Discharge to home or other facility with appropriate resources  Outcome: Progressing  Flowsheets (Taken 1/26/2025 2000)  Discharge to home or other facility with appropriate resources: Identify barriers to discharge with patient and caregiver     Problem: Respiratory - Adult  Goal: Achieves optimal ventilation and oxygenation  1/27/2025 0519 by Tushar Valdez, RN  Outcome: Progressing  1/26/2025 2209 by Case Crawford RCP  Outcome: Progressing  1/26/2025 1704 by Elidia Carroll RCP  Outcome: Progressing  Flowsheets (Taken 1/26/2025 0803 by Brii Scott, RN)  Achieves optimal ventilation and oxygenation:   Assess for changes in respiratory status   Assess for changes in mentation and behavior   Position to facilitate oxygenation and minimize respiratory effort   Oxygen supplementation based on oxygen saturation or arterial blood gases   Encourage broncho-pulmonary hygiene including cough, deep breathe, incentive spirometry   Assess and instruct to report shortness of breath or any respiratory difficulty   Respiratory therapy support as indicated     Problem: Safety - Adult  Goal: Free from fall injury  Outcome: Progressing     Problem: ABCDS Injury Assessment  Goal: Absence of physical injury  Outcome: Progressing

## 2025-01-27 NOTE — PROGRESS NOTES
Physician Progress Note      PATIENT:               LYRIC ARAUZ  CSN #:                  [id number]  :                       1945  ADMIT DATE:       2025 7:47 PM  DISCH DATE:  RESPONDING  PROVIDER #:        Johanna Laura MD          QUERY TEXT:    Patient admitted with acute systolic CHF. Noted documentation of acute   respiratory failure in H&P  and daily cardiology progress notes. In order   to support the diagnosis of acute respiratory failure, please include   additional clinical indicators in your documentation. Or please document if   the diagnosis of acute respiratory failure has been ruled out after further   study.    The medical record reflects the following:  Risk Factors: acute systolic CHF, COPD exacerbation, pneumonia  Clinical Indicators: per H&P \"Acute respiratory failure likely secondary to   new onset systolic heart failure. She is not in acute distress. Pulmonary   Effort: No respiratory distress\" per daily cardiology progress notes \"Acute   hypoxic respiratory failure, secondary to acute CHF and PNA\", VBG pH 7.325   pCO2 36.2 pO2 44.7 HCO3 18.4, lowest sat noted 96%, per nursing VS flowsheet   \"on 2L NC-pt request d/t SOB\"  Treatment: intermittent supplemental O2 2L NC, IV Lasix, IV Zithromax and   Rocephin, Xoponex, CT chest, labs, cultures  Options provided:  -- Acute Respiratory Failure as evidenced by, Please document evidence.  -- Acute Respiratory Failure ruled out after study  -- Other - I will add my own diagnosis  -- Disagree - Not applicable / Not valid  -- Disagree - Clinically unable to determine / Unknown  -- Refer to Clinical Documentation Reviewer    PROVIDER RESPONSE TEXT:    Acute Respiratory Failure has been ruled out after study.    Query created by: Pamela Denney on 2025 8:30 AM      Electronically signed by:  Johanna Laura MD 2025 9:51 AM

## 2025-01-27 NOTE — CARE COORDINATION
Unable to meet with patient to discuss transitional planning as she is currently off unit at dialysis

## 2025-01-27 NOTE — SIGNIFICANT EVENT
Pt to have HD today.    Cleared by nephrology for cardiac cath.    Will proceed in the am.  NPO after midnight  RN notified

## 2025-01-27 NOTE — PROGRESS NOTES
[facility]   Progress Note                   Date:   1/27/2025  Patient name: Elham Amado  Date of admission:  1/24/2025  7:47 PM  MRN:   [id number]  YOB: 1945  PCP: Thee Kay MD    Reason for Admission: Congestive heart failure with unknown left ventricular ejection fraction (HCC) [I50.9]    Subjective:       Clinical Changes / Abnormalities:Pt seen and examined in the room.  Pt denies any CP or sob today.  Family in room. .  Labs, vitals and tele reviewed-        Medications:   Scheduled Meds:   carvedilol  12.5 mg Oral BID WC    amLODIPine  5 mg Oral Daily    insulin glargine  10 Units SubCUTAneous BID    ipratropium  0.5 mg Nebulization BID    levalbuterol  0.63 mg Nebulization BID RT    sodium chloride flush  5-40 mL IntraVENous 2 times per day    [Held by provider] lisinopril  5 mg Oral Daily    furosemide  40 mg IntraVENous BID    insulin lispro  0-8 Units SubCUTAneous 4x Daily AC & HS    aspirin  81 mg Oral Daily    atorvastatin  40 mg Oral Nightly    azithromycin  500 mg IntraVENous Q24H    guaiFENesin  600 mg Oral BID     Continuous Infusions:   sodium chloride      dextrose      heparin (PORCINE) Infusion 14 Units/kg/hr (01/26/25 1025)     CBC:   Recent Labs     01/25/25  0350 01/26/25  0806 01/27/25  0624   WBC 11.2 15.1* 10.6   HGB 11.1* 13.9 12.1    340 368     BMP:    Recent Labs     01/25/25  1351 01/26/25  0806 01/27/25  0624    132* 137   K 4.5 4.6 4.2    100 99   CO2 20 15* 21   BUN 53* 69* 78*   CREATININE 4.1* 4.4* 4.2*   GLUCOSE 235* 263* 273*     Hepatic:   Recent Labs     01/24/25  2043   AST 18   ALT <5*   BILITOT 0.3   ALKPHOS 114*     Troponin:   Recent Labs     01/24/25  1104 01/24/25  1550   TROPHS 108* 106*     BNP: No results for input(s): \"BNP\" in the last 72 hours.  Lipids:   Recent Labs     01/25/25  0350   CHOL 279*   HDL 49     INR: No results for input(s): \"INR\" in the last 72 hours.    EKG:   Normal sinus rhythm, no  acute ST-T wave changes      ECHO: 1/24/2025    Left Ventricle: Severely reduced left ventricular systolic function. EF by 2D Simpsons Biplane is 25%. Left ventricle is smaller than normal. Mildly increased wall thickness. See diagram for wall motion findings.    Aortic Valve: Trileaflet valve. Mildly thickened cusps.    Mitral Valve: Mild annular calcification. Mildly thickened leaflets. Mild regurgitation. Mild stenosis noted. MV mean gradient is 6 mmHg.    Tricuspid Valve: Mild regurgitation. The estimated RVSP is 37 mmHg.    Pericardium: Trivial localized pericardial effusion present around the right ventricle. No indication of cardiac tamponade. Evidence includes no IVC dilation and no chamber collapse.    Image quality is adequate.    Objective:   Vitals: BP (!) 161/92   Pulse 73   Temp 98.1 °F (36.7 °C) (Oral)   Resp 23   Ht 1.473 m (4' 10\")   Wt 56.7 kg (125 lb)   SpO2 100%   BMI 26.13 kg/m²   General appearance: alert and cooperative with exam  HEENT: Head: Normocephalic, no lesions, without obvious abnormality.  Neck: no JVD, trachea midline, no adenopathy  Lungs: Clear to auscultation  Heart: Regular rate and rhythm, s1/s2 auscultated, no murmurs  Abdomen: soft, non-tender, bowel sounds active  Extremities: no edema  Neurologic: not done        Assessment / Acute Cardiac Problems:   Acute systolic CHF  New onset cardiomyopathy, LVEF 25% on TTE 1/24/2025  NSTEMI, elevated troponin -106, Type I versus type II, SHANITA on CKD, CHF  Acute hypoxic respiratory failure, secondary to acute CHF and PNA  Pneumonia with COPD exacerbation  SHANITA on CKD  HTN, HLD  Type II DM with hyperglycemia, poorly controlled  Hx of CVA        Plan of Treatment:   ECHO reviewed and shows reduced EF.  Discussed with pt and family regarding need for cardiac cath once stable. Pt and family are agreeable to proceed when able.  Will need nephrology clearance.    Continue ASA, statin,   IV heparin   HTN.  Remains elevated.  Coreg

## 2025-01-27 NOTE — PROGRESS NOTES
Physical Therapy        Physical Therapy Cancel Note      DATE: 2025    NAME: Elham Amado  MRN: [id number]   : 1945      Patient not seen this date for Physical Therapy due to:    Surgery/Procedure: Tunneled Dialysis Catheter. Will continue to pursue as able.       Electronically signed by Celena Cole PTA on 2025 at 3:10 PM

## 2025-01-27 NOTE — PROGRESS NOTES
Perfect served Cardiology NP regarding heart cath today if patient can eat or not. Waiting for response at this time

## 2025-01-27 NOTE — PROGRESS NOTES
[facility]  Occupational Therapy Not Seen Note    DATE: 2025    NAME: Elham Amado  MRN: [id number]   : 1945      Patient not seen this date for Occupational Therapy due to:    Surgery/Procedure:  Tunneled Dialysis Catheter    Next Scheduled Treatment: Ck      Electronically signed by Yanet Borjas OT on 2025 at 12:15 PM

## 2025-01-27 NOTE — PROGRESS NOTES
[facility]  Office: [phone]  Julian Banegas DO, Ronald Drake DO, Олег Weeks DO, José Sanchez DO, Demarco Delgado MD, Chaya Cry MD, Tommy Madrigal MD, Ness Hernadez MD,  Waldo Jessica MD, Jose G Rice MD, Johanna Laura MD,  Minal Guerra DO, Mady Hurt MD, Fam Jiang MD, Amor Banegas DO, Mayi Moise MD,  Isaac Dickson DO, Iqra Hampton MD, Marianna Bradford MD, Munira Anderson MD, Steph Abernathy MD,  Alexis Powell MD, Heidi Boyd MD, Natan Alexis MD, Ishaan Gant MD, Rod Plasencia MD, Beverly Cooney MD, Hany Yañez DO, Dakota Johnson MD, Minal Boyle MD, Mohsin Reza, MD, Shirley Waterhouse, CNP,  Amanda Molina, CNP, Hany Mcallister, CNP,  Dina Rader, DNP, Angelique Caruso, CNP, Sharon Chavarria, CNP, Geneva Vargas, CNP, Diane Clay, CNP, Natasha Vazquez, PA-C, Yu Mejia, PA-C, Reva Aceves, CNP, Diego Kaplan, CNP,  Pili Ramos, CNP, Valarie Aguilar, CNP, Samantha Castillo, CNP,  Nikki Causey, CNS, Maye Jackman, CNP, Gladys Whitlock, CNP,   Wendy Rubio, CNP         [facility]   IN-PATIENT SERVICE   [facility]    Progress Note    1/27/2025    1:37 PM    Name:   Elham Amado  MRN:     [id number]     Acct:      [id number]   Room:   2027/2027-01  IP Day:  3  Admit Date:  1/24/2025  7:47 PM    PCP:   Thee Kay MD  Code Status:  DNR-CCA    Subjective:     C/C: No chief complaint on file.    Interval History Status: not changed.     Pateint seen and examined in bed.  Just returned from tunnel catheter placement.  No acute issues overnight.  Having some urine output.  However creatinine remaining flat    Brief History:     80-year-old female past medical history of diabetes type 2 insulin-dependent, hyperlipidemia, hypertension, history of CVA presents from outlying facility with shortness of breath.  Concern for pneumonia and has been treated with antibiotics outpatient patient transferred to [facility]

## 2025-01-27 NOTE — PLAN OF CARE
Problem: Chronic Conditions and Co-morbidities  Goal: Patient's chronic conditions and co-morbidity symptoms are monitored and maintained or improved  1/27/2025 1747 by Ever Gonzalez RN  Outcome: Progressing  1/27/2025 0519 by Tushar Valdez RN  Outcome: Progressing  Flowsheets (Taken 1/26/2025 2000)  Care Plan - Patient's Chronic Conditions and Co-Morbidity Symptoms are Monitored and Maintained or Improved: Monitor and assess patient's chronic conditions and comorbid symptoms for stability, deterioration, or improvement     Problem: Discharge Planning  Goal: Discharge to home or other facility with appropriate resources  1/27/2025 1747 by Ever Gonzalez RN  Outcome: Progressing  1/27/2025 0519 by Tushar Valdez RN  Outcome: Progressing  Flowsheets (Taken 1/26/2025 2000)  Discharge to home or other facility with appropriate resources: Identify barriers to discharge with patient and caregiver     Problem: Respiratory - Adult  Goal: Achieves optimal ventilation and oxygenation  1/27/2025 1747 by Ever Gonzalez RN  Outcome: Progressing  1/27/2025 0519 by Tushar Valdez RN  Outcome: Progressing     Problem: Safety - Adult  Goal: Free from fall injury  1/27/2025 1747 by Ever Gonzalez RN  Outcome: Progressing  1/27/2025 0519 by Tushar Valdez RN  Outcome: Progressing     Problem: ABCDS Injury Assessment  Goal: Absence of physical injury  1/27/2025 1747 by Ever Gonzalez RN  Outcome: Progressing  1/27/2025 0519 by Tushar Valdez RN  Outcome: Progressing     Problem: Skin/Tissue Integrity  Goal: Skin integrity remains intact  Description: 1.  Monitor for areas of redness and/or skin breakdown  2.  Assess vascular access sites hourly  3.  Every 4-6 hours minimum:  Change oxygen saturation probe site  4.  Every 4-6 hours:  If on nasal continuous positive airway pressure, respiratory therapy assess nares and determine need for appliance change or resting period  Outcome: Progressing

## 2025-01-27 NOTE — PROGRESS NOTES
Renal Progress Note    Patient :  Elham Amado; 80 y.o. MRN# [id number]  Location:  2027/2027-01  Attending:  Johanna Laura MD  Admit Date:  1/24/2025   Hospital Day: 3    Subjective:     Patient is seen and examined at bedside, she is s/p tunneled catheter placement. Will plan for first HD treatment later today. Blood sugars remain poorly controlled, most recent glucose level 268.     Urine output documented at 1700 cc over 24 hours.  Patient is maintained on 40 mg IV Lasix twice daily.    Labs reviewed.  Recent Labs     01/25/25  1351 01/26/25  0806 01/27/25  0624    132* 137   K 4.5 4.6 4.2    100 99   CO2 20 15* 21   BUN 53* 69* 78*   CREATININE 4.1* 4.4* 4.2*   GLUCOSE 235* 263* 273*   CALCIUM 9.4 9.4 9.0     Hgb 12.1    Renal workup reviewed.  Renal ultrasound demonstrates right kidney measuring 8.9 cm, left kidney measuring 8.3 cm, increased cortical echogenicity bilaterally, small cyst noted along the upper pole of the right kidney, no hydronephrosis or intrarenal stones.     Urine chemistry reviewed, urine chloride 84, urine sodium 79, UPC 3.59.    Serologies reviewed, SPEP pending, free light chain ratio 0.47, complements within normal limits, hepatitis panel nonreactive.    Brief History reviewed.  80-year-old female with past medical history of CKD stage IV, uncontrolled type 2 diabetes, hypertension, hyperlipidemia, and CVA who presented to the hospital with chief complaint of shortness of breath, initially found to have elevated BNP of 16,316 and bedside echo demonstrated EF of 25%.  Initial lab work also demonstrated significantly elevated creatinine of 3.4 with GFR of 13.  Patient has history of noncompliance and was last seen by our service in August 2023 due to SHANITA.  At that time her baseline creatinine was deemed to be around 1.8.  Nephrology was consulted for SHANITA on CKD stage IV.  Outpatient Medications:     Medications Prior to Admission: amLODIPine (NORVASC) 5 MG tablet, Take

## 2025-01-27 NOTE — PROGRESS NOTES
Dialysis Time Out  To be done by RN and tech or 2 RNs  Staff Names Ana WASHBURN Rn and Duke KATE RN    [x]  Identity of the patient using 2 patient identifiers  [x]  Consent for treatment  [x]  Equipment-proper machine and dialyzer  [x]  B-Hep B status HBsAG Neg 1.25.25  [x]  Orders- to include bath, blood flow, dialyzer, time and fluid removal  [x]  Access-Correct site and in working order  [x]  Time for patient to ask questions.

## 2025-01-27 NOTE — BRIEF OP NOTE
Brief Postoperative Note    Elham Amado  YOB: 1945  [id number]    Pre-operative Diagnosis: Acute Renal Failure      Post-operative Diagnosis: Same    Procedure: Tunneled Dialysis Catheter    Medication Given: none    Anesthesia: 1%Lidocaine     Surgeons/Assistants: AYESHA Morales    Estimated Blood Loss: Minimal    Complications: none    14 Fr x 19 cm tip to cuff palindrome tunneled HD Catheter placed successfully via the Site:  Right Internal Jugular Vein.  Catheter secured to skin, dressing applied.  Catheter locked with Heparin.  May use catheter.    Electronically signed by AYESHA Morales on 1/27/2025 at 11:16 AM

## 2025-01-28 ENCOUNTER — APPOINTMENT (OUTPATIENT)
Dept: VASCULAR LAB | Age: 80
DRG: 280 | End: 2025-01-28
Attending: STUDENT IN AN ORGANIZED HEALTH CARE EDUCATION/TRAINING PROGRAM
Payer: MEDICARE

## 2025-01-28 ENCOUNTER — APPOINTMENT (OUTPATIENT)
Dept: DIALYSIS | Age: 80
DRG: 280 | End: 2025-01-28
Attending: STUDENT IN AN ORGANIZED HEALTH CARE EDUCATION/TRAINING PROGRAM
Payer: MEDICARE

## 2025-01-28 PROBLEM — I25.10 CAD, MULTIPLE VESSEL: Status: ACTIVE | Noted: 2025-01-28

## 2025-01-28 LAB
ANION GAP SERPL CALCULATED.3IONS-SCNC: 14 MMOL/L (ref 9–16)
ANTI-XA UNFRAC HEPARIN: 0.46 IU/L
ANTI-XA UNFRAC HEPARIN: 0.65 IU/L
BUN SERPL-MCNC: 47 MG/DL (ref 8–23)
CALCIUM SERPL-MCNC: 9.4 MG/DL (ref 8.6–10.4)
CHLORIDE SERPL-SCNC: 99 MMOL/L (ref 98–107)
CO2 SERPL-SCNC: 24 MMOL/L (ref 20–31)
CREAT SERPL-MCNC: 3.1 MG/DL (ref 0.6–0.9)
ECHO BSA: 1.52 M2
ECHO BSA: 1.52 M2
GFR, ESTIMATED: 14 ML/MIN/1.73M2
GLUCOSE BLD-MCNC: 183 MG/DL (ref 65–105)
GLUCOSE BLD-MCNC: 221 MG/DL (ref 65–105)
GLUCOSE SERPL-MCNC: 176 MG/DL (ref 74–99)
MAGNESIUM SERPL-MCNC: 2.2 MG/DL (ref 1.6–2.4)
POTASSIUM SERPL-SCNC: 3.7 MMOL/L (ref 3.7–5.3)
SODIUM SERPL-SCNC: 137 MMOL/L (ref 136–145)
VAS LEFT CCA DIST EDV: 12.3 CM/S
VAS LEFT CCA DIST PSV: 115 CM/S
VAS LEFT CCA MID EDV: 6.99 CM/S
VAS LEFT CCA MID PSV: 36.5 CM/S
VAS LEFT CCA PROX EDV: 5 CM/S
VAS LEFT CCA PROX PSV: 34.3 CM/S
VAS LEFT ECA EDV: 4.34 CM/S
VAS LEFT ECA PSV: 32.9 CM/S
VAS LEFT GSV ANKLE DIAM: 2.7 MM
VAS LEFT GSV AT KNEE DIAM: 2.6 MM
VAS LEFT GSV BK MID DIAM: 2.7 MM
VAS LEFT GSV JUNC DIAM: 5.9 MM
VAS LEFT GSV THIGH MID DIAM: 2.6 MM
VAS LEFT ICA DIST EDV: 16.6 CM/S
VAS LEFT ICA DIST PSV: 39.4 CM/S
VAS LEFT ICA MID EDV: 15.2 CM/S
VAS LEFT ICA MID PSV: 38.6 CM/S
VAS LEFT ICA PROX EDV: 8.2 CM/S
VAS LEFT ICA PROX PSV: 27.9 CM/S
VAS LEFT ICA/CCA PSV: 0.34
VAS LEFT VERTEBRAL PSV: 17.9 CM/S
VAS RIGHT CCA DIST EDV: 13.3 CM/S
VAS RIGHT CCA DIST PSV: 56.7 CM/S
VAS RIGHT CCA MID EDV: 10.9 CM/S
VAS RIGHT CCA MID PSV: 48 CM/S
VAS RIGHT CCA PROX EDV: 11.2 CM/S
VAS RIGHT CCA PROX PSV: 58.1 CM/S
VAS RIGHT ECA EDV: 7.29 CM/S
VAS RIGHT ECA PSV: 38.5 CM/S
VAS RIGHT GSV ANKLE DIAM: 2.5 MM
VAS RIGHT GSV AT KNEE DIAM: 2.3 MM
VAS RIGHT GSV BK DIST DIAM: 2.3 MM
VAS RIGHT GSV BK MID DIAM: 2 MM
VAS RIGHT GSV THIGH MID DIAM: 2.5 MM
VAS RIGHT ICA DIST EDV: 17.7 CM/S
VAS RIGHT ICA DIST PSV: 33.3 CM/S
VAS RIGHT ICA MID EDV: 17.2 CM/S
VAS RIGHT ICA MID PSV: 38.4 CM/S
VAS RIGHT ICA PROX EDV: 25.9 CM/S
VAS RIGHT ICA PROX PSV: 51.5 CM/S
VAS RIGHT ICA/CCA PSV: 0.9
VAS RIGHT VERTEBRAL EDV: 12.8 CM/S
VAS RIGHT VERTEBRAL PSV: 33.2 CM/S

## 2025-01-28 PROCEDURE — 6370000000 HC RX 637 (ALT 250 FOR IP): Performed by: INTERNAL MEDICINE

## 2025-01-28 PROCEDURE — 94660 CPAP INITIATION&MGMT: CPT

## 2025-01-28 PROCEDURE — C1894 INTRO/SHEATH, NON-LASER: HCPCS | Performed by: INTERNAL MEDICINE

## 2025-01-28 PROCEDURE — 6370000000 HC RX 637 (ALT 250 FOR IP): Performed by: NURSE PRACTITIONER

## 2025-01-28 PROCEDURE — 6360000002 HC RX W HCPCS: Performed by: NURSE PRACTITIONER

## 2025-01-28 PROCEDURE — 6360000002 HC RX W HCPCS: Performed by: INTERNAL MEDICINE

## 2025-01-28 PROCEDURE — 93454 CORONARY ARTERY ANGIO S&I: CPT | Performed by: INTERNAL MEDICINE

## 2025-01-28 PROCEDURE — 83735 ASSAY OF MAGNESIUM: CPT

## 2025-01-28 PROCEDURE — 6370000000 HC RX 637 (ALT 250 FOR IP): Performed by: STUDENT IN AN ORGANIZED HEALTH CARE EDUCATION/TRAINING PROGRAM

## 2025-01-28 PROCEDURE — 6360000004 HC RX CONTRAST MEDICATION: Performed by: INTERNAL MEDICINE

## 2025-01-28 PROCEDURE — 93880 EXTRACRANIAL BILAT STUDY: CPT

## 2025-01-28 PROCEDURE — 82947 ASSAY GLUCOSE BLOOD QUANT: CPT

## 2025-01-28 PROCEDURE — 51702 INSERT TEMP BLADDER CATH: CPT

## 2025-01-28 PROCEDURE — 85520 HEPARIN ASSAY: CPT

## 2025-01-28 PROCEDURE — 2700000000 HC OXYGEN THERAPY PER DAY

## 2025-01-28 PROCEDURE — 99153 MOD SED SAME PHYS/QHP EA: CPT | Performed by: INTERNAL MEDICINE

## 2025-01-28 PROCEDURE — 94761 N-INVAS EAR/PLS OXIMETRY MLT: CPT

## 2025-01-28 PROCEDURE — 99232 SBSQ HOSP IP/OBS MODERATE 35: CPT | Performed by: INTERNAL MEDICINE

## 2025-01-28 PROCEDURE — 2709999900 HC NON-CHARGEABLE SUPPLY: Performed by: INTERNAL MEDICINE

## 2025-01-28 PROCEDURE — 4A023N7 MEASUREMENT OF CARDIAC SAMPLING AND PRESSURE, LEFT HEART, PERCUTANEOUS APPROACH: ICD-10-PCS | Performed by: INTERNAL MEDICINE

## 2025-01-28 PROCEDURE — 75630 X-RAY AORTA LEG ARTERIES: CPT | Performed by: INTERNAL MEDICINE

## 2025-01-28 PROCEDURE — 94640 AIRWAY INHALATION TREATMENT: CPT

## 2025-01-28 PROCEDURE — B2151ZZ FLUOROSCOPY OF LEFT HEART USING LOW OSMOLAR CONTRAST: ICD-10-PCS | Performed by: INTERNAL MEDICINE

## 2025-01-28 PROCEDURE — 2500000003 HC RX 250 WO HCPCS: Performed by: NURSE PRACTITIONER

## 2025-01-28 PROCEDURE — 93970 EXTREMITY STUDY: CPT | Performed by: SURGERY

## 2025-01-28 PROCEDURE — 6370000000 HC RX 637 (ALT 250 FOR IP): Performed by: PHYSICIAN ASSISTANT

## 2025-01-28 PROCEDURE — 99152 MOD SED SAME PHYS/QHP 5/>YRS: CPT | Performed by: INTERNAL MEDICINE

## 2025-01-28 PROCEDURE — C1892 INTRO/SHEATH,FIXED,PEEL-AWAY: HCPCS | Performed by: INTERNAL MEDICINE

## 2025-01-28 PROCEDURE — 80048 BASIC METABOLIC PNL TOTAL CA: CPT

## 2025-01-28 PROCEDURE — 36415 COLL VENOUS BLD VENIPUNCTURE: CPT

## 2025-01-28 PROCEDURE — C1769 GUIDE WIRE: HCPCS | Performed by: INTERNAL MEDICINE

## 2025-01-28 PROCEDURE — 90935 HEMODIALYSIS ONE EVALUATION: CPT

## 2025-01-28 PROCEDURE — 2060000000 HC ICU INTERMEDIATE R&B

## 2025-01-28 PROCEDURE — 93880 EXTRACRANIAL BILAT STUDY: CPT | Performed by: SURGERY

## 2025-01-28 PROCEDURE — 93970 EXTREMITY STUDY: CPT

## 2025-01-28 PROCEDURE — 93458 L HRT ARTERY/VENTRICLE ANGIO: CPT | Performed by: INTERNAL MEDICINE

## 2025-01-28 PROCEDURE — 6360000002 HC RX W HCPCS: Performed by: PHYSICIAN ASSISTANT

## 2025-01-28 PROCEDURE — B2111ZZ FLUOROSCOPY OF MULTIPLE CORONARY ARTERIES USING LOW OSMOLAR CONTRAST: ICD-10-PCS | Performed by: INTERNAL MEDICINE

## 2025-01-28 RX ORDER — IOPAMIDOL 755 MG/ML
INJECTION, SOLUTION INTRAVASCULAR PRN
Status: DISCONTINUED | OUTPATIENT
Start: 2025-01-28 | End: 2025-01-28 | Stop reason: HOSPADM

## 2025-01-28 RX ORDER — ALPRAZOLAM 0.25 MG/1
0.25 TABLET ORAL 3 TIMES DAILY PRN
Status: DISCONTINUED | OUTPATIENT
Start: 2025-01-28 | End: 2025-02-05 | Stop reason: HOSPADM

## 2025-01-28 RX ORDER — MIDAZOLAM 1 MG/ML
INJECTION INTRAMUSCULAR; INTRAVENOUS PRN
Status: DISCONTINUED | OUTPATIENT
Start: 2025-01-28 | End: 2025-01-28 | Stop reason: HOSPADM

## 2025-01-28 RX ADMIN — AMLODIPINE BESYLATE 5 MG: 5 TABLET ORAL at 10:37

## 2025-01-28 RX ADMIN — INSULIN GLARGINE 10 UNITS: 100 INJECTION, SOLUTION SUBCUTANEOUS at 21:43

## 2025-01-28 RX ADMIN — AMLODIPINE BESYLATE 5 MG: 5 TABLET ORAL at 21:42

## 2025-01-28 RX ADMIN — HEPARIN SODIUM 1600 UNITS: 1000 INJECTION INTRAVENOUS; SUBCUTANEOUS at 16:29

## 2025-01-28 RX ADMIN — Medication 3 MG: at 21:42

## 2025-01-28 RX ADMIN — IPRATROPIUM BROMIDE 0.5 MG: 0.5 SOLUTION RESPIRATORY (INHALATION) at 08:18

## 2025-01-28 RX ADMIN — INSULIN GLARGINE 10 UNITS: 100 INJECTION, SOLUTION SUBCUTANEOUS at 10:37

## 2025-01-28 RX ADMIN — GUAIFENESIN 600 MG: 600 TABLET, EXTENDED RELEASE ORAL at 21:42

## 2025-01-28 RX ADMIN — HEPARIN SODIUM 1600 UNITS: 1000 INJECTION INTRAVENOUS; SUBCUTANEOUS at 16:27

## 2025-01-28 RX ADMIN — LEVALBUTEROL HYDROCHLORIDE 0.63 MG: 0.63 SOLUTION RESPIRATORY (INHALATION) at 08:18

## 2025-01-28 RX ADMIN — GUAIFENESIN 600 MG: 600 TABLET, EXTENDED RELEASE ORAL at 10:37

## 2025-01-28 RX ADMIN — ALPRAZOLAM 0.25 MG: 0.25 TABLET ORAL at 17:42

## 2025-01-28 RX ADMIN — FUROSEMIDE 40 MG: 10 INJECTION, SOLUTION INTRAMUSCULAR; INTRAVENOUS at 10:36

## 2025-01-28 RX ADMIN — IPRATROPIUM BROMIDE 0.5 MG: 0.5 SOLUTION RESPIRATORY (INHALATION) at 20:31

## 2025-01-28 RX ADMIN — FUROSEMIDE 40 MG: 10 INJECTION, SOLUTION INTRAMUSCULAR; INTRAVENOUS at 17:42

## 2025-01-28 RX ADMIN — CARVEDILOL 12.5 MG: 12.5 TABLET, FILM COATED ORAL at 10:38

## 2025-01-28 RX ADMIN — SODIUM CHLORIDE, PRESERVATIVE FREE 10 ML: 5 INJECTION INTRAVENOUS at 10:37

## 2025-01-28 RX ADMIN — SODIUM CHLORIDE, PRESERVATIVE FREE 10 ML: 5 INJECTION INTRAVENOUS at 21:42

## 2025-01-28 RX ADMIN — ONDANSETRON 4 MG: 2 INJECTION INTRAMUSCULAR; INTRAVENOUS at 16:24

## 2025-01-28 RX ADMIN — ATORVASTATIN CALCIUM 40 MG: 40 TABLET, FILM COATED ORAL at 21:42

## 2025-01-28 RX ADMIN — CARVEDILOL 12.5 MG: 12.5 TABLET, FILM COATED ORAL at 17:42

## 2025-01-28 ASSESSMENT — PAIN SCALES - GENERAL: PAINLEVEL_OUTOF10: 0

## 2025-01-28 NOTE — PLAN OF CARE
Problem: Respiratory - Adult  Goal: Achieves optimal ventilation and oxygenation  1/27/2025 2118 by Parker Sapp, SALVADOR  Outcome: Progressing   BRONCHOSPASM/BRONCHOCONSTRICTION     [x]         IMPROVE AERATION/BREATH SOUNDS  [x]   ADMINISTER BRONCHODILATOR THERAPY AS APPROPRIATE  [x]   ASSESS BREATH SOUNDS  []   IMPLEMENT AEROSOL/MDI PROTOCOL  [x]   PATIENT EDUCATION AS NEEDED

## 2025-01-28 NOTE — PLAN OF CARE
Problem: Chronic Conditions and Co-morbidities  Goal: Patient's chronic conditions and co-morbidity symptoms are monitored and maintained or improved  Outcome: Progressing  Flowsheets (Taken 1/28/2025 0800)  Care Plan - Patient's Chronic Conditions and Co-Morbidity Symptoms are Monitored and Maintained or Improved: Monitor and assess patient's chronic conditions and comorbid symptoms for stability, deterioration, or improvement     Problem: Discharge Planning  Goal: Discharge to home or other facility with appropriate resources  Outcome: Progressing  Flowsheets (Taken 1/28/2025 0800)  Discharge to home or other facility with appropriate resources: Identify barriers to discharge with patient and caregiver     Problem: Respiratory - Adult  Goal: Achieves optimal ventilation and oxygenation  Outcome: Progressing  Flowsheets (Taken 1/28/2025 0800)  Achieves optimal ventilation and oxygenation: Assess for changes in respiratory status     Problem: Safety - Adult  Goal: Free from fall injury  Outcome: Progressing     Problem: ABCDS Injury Assessment  Goal: Absence of physical injury  Outcome: Progressing     Problem: Skin/Tissue Integrity  Goal: Skin integrity remains intact  Outcome: Progressing  Flowsheets (Taken 1/28/2025 0800)  Skin Integrity Remains Intact: Monitor for areas of redness and/or skin breakdown     Problem: Pain  Goal: Verbalizes/displays adequate comfort level or baseline comfort level  Outcome: Progressing

## 2025-01-28 NOTE — CONSULTS
organomegaly.  Extremities:  No cyanosis, clubbing, or edema.  Intact pulses in all four extremities.  Musculoskeletal:  Intact range of motion of peripheral joints.  Normal muscular strength.  Neurologic:  Cranial nerves are grossly intact. Non-focal sensory deficits on exam.  Psychiatric: Mood and affect are appropriate.      Imaging Studies:    Cardiac Cath:pending     Echo:  Left Ventricle: Severely reduced left ventricular systolic function. EF by 2D Simpsons Biplane is 25%. Left ventricle is smaller than normal. Mildly increased wall thickness. See diagram for wall motion findings.    Aortic Valve: Trileaflet valve. Mildly thickened cusps.    Mitral Valve: Mild annular calcification. Mildly thickened leaflets. Mild regurgitation. Mild stenosis noted. MV mean gradient is 6 mmHg.    Tricuspid Valve: Mild regurgitation. The estimated RVSP is 37 mmHg.    Pericardium: Trivial localized pericardial effusion present around the right ventricle. No indication of cardiac tamponade. Evidence includes no IVC dilation and no chamber collapse.    Image quality is adequate.       CT:1. Probable mild CHF related changes with cardiomegaly/four-chamber cardiac  enlargement, interstitial prominence in the lower lung zones and ground-glass  opacity, mild.  Mild consolidative ground-glass opacity in the posterior  right upper lobe which could represent edema and/or infiltrate.  Small  bilateral effusions.  Follow-up is recommended to document resolution.  2. Cholelithiasis.  3. Age-indeterminate compression deformity of T4.  4. Prior compression deformity and vertebroplasty at L1.  5. Coronary artery disease.  6. Ectasia and tortuosity of the thoracic aorta.       Prior Labs reviewed:   BMP reflective of CKD stage IV electrolytes stable  Normal CBC  Troponin elevation noted on admission    Assessment   Patient Active Problem List   Diagnosis    SHANITA (acute kidney injury) (HCC)    Hyperglycemia due to diabetes mellitus (HCC)     Acute renal failure with tubular necrosis (HCC)    Stage 4 chronic kidney disease (HCC)    Essential hypertension    Urinary retention with incomplete bladder emptying    Hemiparesis affecting left side as late effect of cerebrovascular accident (CVA) (HCC)    Mixed hyperlipidemia    Cervicalgia    Congestive heart failure with unknown left ventricular ejection fraction (HCC)    Persistent proteinuria       Risks Reviewed w/pt  - Risk for stroke: Yes  - Risk for bleeding:Yes  - Risk for cardiac arrythmias: Yes  - Small risk of death: Yes  - Risks of pneumonia from ventilator: Yes  - Risk for infection: Yes    PLAN:  We will have neurology eval for CABG-she describes massive stroke in the 90s  At this time please obtain vein mapping, carotid ultrasound.  Please continue to dialyze per nephrology's recommendations to optimize her for potential bypass surgery  Timing to be determined at this time.  Cardiothoracic surgery will continue to follow along    GRACY ONEILL, QING - NP, CNP  Phone: [phone]

## 2025-01-28 NOTE — PROGRESS NOTES
Renal Progress Note    Patient :  Elham Amado; 80 y.o. MRN# [id number]  Location:  2027/2027-01  Attending:  Lisa Correa MD  Admit Date:  1/24/2025   Hospital Day: 4    Subjective:     Seen and examined at bedside, underwent cardiac cath this morning which showed severe multivessel disease, cardiothoracic surgery has been consulted and preoperative workup is underway.    Patient underwent first dialysis treatment yesterday ran for 2 hours with no fluid removal via right chest tunneled cath.    Urine output documented at 1300 cc over 24 hours.  Patient is maintained on 40 mg IV Lasix twice daily.    Labs reviewed.  Recent Labs     01/26/25  0806 01/27/25  0624 01/28/25  0528   * 137 137   K 4.6 4.2 3.7    99 99   CO2 15* 21 24   BUN 69* 78* 47*   CREATININE 4.4* 4.2* 3.1*   GLUCOSE 263* 273* 176*   CALCIUM 9.4 9.0 9.4     Hgb 12.1    Renal workup reviewed.  Renal ultrasound demonstrates right kidney measuring 8.9 cm, left kidney measuring 8.3 cm, increased cortical echogenicity bilaterally, small cyst noted along the upper pole of the right kidney, no hydronephrosis or intrarenal stones.     Urine chemistry reviewed, urine chloride 84, urine sodium 79, UPC 3.59.    Serologies reviewed, SPEP pending, free light chain ratio 0.47, complements within normal limits, hepatitis panel nonreactive.    Brief History reviewed.  80-year-old female with past medical history of CKD stage IV, uncontrolled type 2 diabetes, hypertension, hyperlipidemia, and CVA who presented to the hospital with chief complaint of shortness of breath, initially found to have elevated BNP of 16,316 and bedside echo demonstrated EF of 25%.  Initial lab work also demonstrated significantly elevated creatinine of 3.4 with GFR of 13.  Patient has history of noncompliance and was last seen by our service in August 2023 due to SHANITA.  At that time her baseline creatinine was deemed to be around 1.8.  Nephrology was consulted for SHANITA on CKD  stage IV.  Outpatient Medications:     Medications Prior to Admission: amLODIPine (NORVASC) 5 MG tablet, Take 1 tablet by mouth daily  Magnesium 400 MG CAPS, Take by mouth three times a week  Cholecalciferol (VITAMIN D3) 50 MCG (2000 UT) CAPS, Take 1 capsule by mouth daily  Omega-3 Fatty Acids (FISH OIL) 1000 MG capsule, Take 1 capsule by mouth three times a week  zinc gluconate 50 MG tablet, Take 1 tablet by mouth daily  Multiple Vitamin (MULTI VITAMIN DAILY PO), Take by mouth  traMADol (ULTRAM) 50 MG tablet, Take 1 tablet by mouth every 6 hours as needed for Pain for up to 90 days. Max Daily Amount: 200 mg  LANTUS SOLOSTAR 100 UNIT/ML injection pen, INJECT 40 UNITS AT BEDTIME  Insulin Pen Needle (PEN NEEDLES) 32G X 4 MM MISC, 1 Needle by Does not apply route daily  Continuous Glucose Sensor (FREESTYLE TERRY 2 SENSOR) MISC, 2 Units by Does not apply route every 14 days  HUMALOG KWIKPEN 100 UNIT/ML SOPN, Inject 10 Units into the skin 3 times daily (before meals)  CRANBERRY PO, Take by mouth three times a week  blood glucose test strips (ASCENSIA AUTODISC VI;ONE TOUCH ULTRA TEST VI) strip, 1 each by In Vitro route in the morning, at noon, in the evening, and at bedtime 2-4 times daily PRN  Lancets 33G MISC, 1 Lancet  by Does not apply route in the morning, at noon, in the evening, and at bedtime 2-4 times daily PRN  Ascorbic Acid (VITAMIN C) 1000 MG tablet, Take 1 tablet by mouth daily    Current Medications:     Scheduled Meds:    amLODIPine  5 mg Oral BID    carvedilol  12.5 mg Oral BID WC    insulin glargine  10 Units SubCUTAneous BID    ipratropium  0.5 mg Nebulization BID    levalbuterol  0.63 mg Nebulization BID RT    sodium chloride flush  5-40 mL IntraVENous 2 times per day    furosemide  40 mg IntraVENous BID    insulin lispro  0-8 Units SubCUTAneous 4x Daily AC & HS    aspirin  81 mg Oral Daily    atorvastatin  40 mg Oral Nightly    guaiFENesin  600 mg Oral BID     Input/Output:       I/O last 3 completed

## 2025-01-28 NOTE — CARE COORDINATION
Met with patient's son to discuss transitional planning. He is agreeable with patient returning home. He is requesting home care. List provided

## 2025-01-28 NOTE — PROGRESS NOTES
Dialysis Post Treatment Note  Vitals:    01/28/25 1632   BP: (!) 155/82   Pulse: 56   Resp: 20   Temp: 98.5 °F (36.9 °C)   SpO2:      Pre-Weight = 58.1 kg  Post-weight = Weight - Scale: 57.2 kg (126 lb 1.7 oz)  Total Liters Processed = Blood Volume Processed (Liters): 45.08 L  Rinseback Volume (mL) = Rinseback Volume (ml): 50 ml  Net Removal (mL) =  855 ml   Patient's dry weight=  Type of access used= Right tunneled cath   Length of treatment=165 minutes.     Pt tolerated tx well. Pt c/o of N/V; PRN zofran given. Pt began holding breath causing arterial pressure alarm. Tx ended 15 minutes early. 0.8 liters removed. Report called to GARIMA Drew RN.

## 2025-01-28 NOTE — PLAN OF CARE
Patient back in room after C. Discussed findings with Dr. Conde. He reports that he informed the son of the cath findings and consulted CTS. Ok to resume heparin now. RN informed.     Electronically signed by QING Bronson CNP on 1/28/2025 at 11:00 AM  [facility]  [phone]

## 2025-01-28 NOTE — PROGRESS NOTES
[facility]  Office: [phone]  Julian Banegas DO, Ronald Drake DO, Олег Weeks DO, José Sanchez DO, Demarco Delgado MD, Chaya Cyr MD, Tommy Madrigal MD, Ness Hernadez MD,  Waldo Jessica MD, Jose G Rice MD, Johanna Laura MD,  Minal Guerra DO, Mady Hurt MD, Fam Jiang MD, Amor Banegas DO, Mayi Moise MD,  Isaac Dickson DO, Iqra Hampton MD, Marianna Bradford MD, Munira Anderson MD, Steph Abernathy MD,  Alexis Powell MD, Heidi Boyd MD, Natan Alexis MD, Ishaan Gant MD, Rod Plasencia MD, Beverly Cooney MD, Hany Yañez DO, Dakota Johnson MD, Minal Boyle MD, Mohsin Reza, MD, Shirley Waterhouse, CNP,  Amanda Molina CNP, Hany Mcallister, CNP,  Dina Rader, DNP, Angelique Caruso, CNP, Sharon Chavarria, CNP, Geneva Vargas, CNP, Diane Caly, CNP, Natasha Vazquez, PA-C, Yu Mejia, PA-C, Reva Aceves, CNP, Diego Kaplan, CNP,  Pili Ramos, CNP, Valarie Aguilar, CNP, Samantha Castillo, CNP,  Nikki Causey, CNS, Maye Jackman, CNP, Gladys Whitlock, CNP,   Wendy Rubio, CNP         [facility]   IN-PATIENT SERVICE   [facility]    Progress Note    1/28/2025    8:05 AM    Name:   Elham Amado  MRN:     [id number]     Acct:      [id number]   Room:   2027/2027-01  IP Day:  4  Admit Date:  1/24/2025  7:47 PM    PCP:   Thee Kay MD  Code Status:  DNR-CCA    Subjective:     C/C: No chief complaint on file.    Interval History Status: improved.     Cath today. Tolerated HD yesterday. No documented overnight events. Denies any new or worsening symptoms. She said she felt better but coughed repetitively at bedside. Cts consult for multivessel disease. Resumed on heparin.     Brief History:     80-year-old female past medical history of diabetes type 2 insulin-dependent, hyperlipidemia, hypertension, history of CVA presents from outlying facility with shortness of breath.  Concern for pneumonia and has been treated with

## 2025-01-28 NOTE — CARE COORDINATION
SW met with pt to discuss outpatient dialysis. Per pt request, SW contacted pt's son, Heladio to complete outpatient dialysis assessment. Referral placed to [facility] as back up.     Hemodialysis Initial Assessment    Information provided by: son-Heladio     New or Current with HD: new    Unit:  [facility] with [facility] as back up     Schedule:  Pt's son requested TTS. Mid-morning/afternoon     Physician:  [facility]    Transportation:  Heladio reports he drives and has a reliable vehicle     Insurance:  [facility] Medicare    DME:  Per Heladio, they are awaiting approval through insurance for a wheelchair    Discharge Plan: likely home

## 2025-01-28 NOTE — PROGRESS NOTES
Physical Therapy        Physical Therapy Cancel Note      DATE: 2025    NAME: Elham Amado  MRN: [id number]   : 1945      Patient not seen this date for Physical Therapy due to:    Surgery/Procedure: Pt currently out of room for heart cath; pt scheduled for dialysis in PM per RN. Will check back tomorrow, , as time allows.    Electronically signed by Stas Bruno PTA on 2025 at 10:26 AM

## 2025-01-28 NOTE — PROGRESS NOTES
[facility]  Occupational Therapy Not Seen Note    DATE: 2025    NAME: Elham Amado  MRN: [id number]   : 1945      Patient not seen this date for Occupational Therapy due to:    Surgery/Procedure: heart cath. OT will continue to follow.    Next Scheduled Treatment:       Electronically signed by GRACIELA Adkins on 2025 at 10:43 AM

## 2025-01-28 NOTE — PROGRESS NOTES
Dialysis Time Out  To be done by RN and tech or 2 RNs  Staff Names Melida POOLE RN     [x]  Identity of the patient using 2 patient identifiers  [x]  Consent for treatment  [x]  Equipment-proper machine and dialyzer  [x]  B-Hep B status (Hep B ag neg 1/12/25)  [x]  Orders- to include bath, blood flow, dialyzer, time and fluid removal  [x]  Access-Correct site and in working order  [x]  Time for patient to ask questions.

## 2025-01-28 NOTE — PROGRESS NOTES
Dialysis Post Treatment Note  Vitals:    01/27/25 1830   BP: (!) 190/94   Pulse: 80   Resp: 21   Temp: 97.7 F   SpO2: 100     Pre-Weight = 57.5 kg  Post-weight = Weight - Scale: 57.5 kg (126 lb 12.2 oz)  Total Liters Processed = Blood Volume Processed (Liters): 28.69 L  Rinseback Volume (mL) = Rinseback Volume (ml): 240 ml  Net Removal (mL) =  No net fluid removal  Patient's dry weight=TBD  Type of access used= CVC Tunneled @R Chest  Length of treatment=122 minutes    Pt completed two-hour HD TX with elevated BP. Ports reversed due to intermittent arterial pressure alarms. Catheter care done. Report given to CAR2 nurse.

## 2025-01-29 ENCOUNTER — APPOINTMENT (OUTPATIENT)
Dept: DIALYSIS | Age: 80
DRG: 280 | End: 2025-01-29
Attending: STUDENT IN AN ORGANIZED HEALTH CARE EDUCATION/TRAINING PROGRAM
Payer: MEDICARE

## 2025-01-29 PROBLEM — I63.9 INFARCTION OF RIGHT BASAL GANGLIA (HCC): Status: ACTIVE | Noted: 2025-01-29

## 2025-01-29 PROBLEM — Z99.2 DEPENDENCE ON RENAL DIALYSIS (HCC): Status: ACTIVE | Noted: 2025-01-29

## 2025-01-29 LAB
ANION GAP SERPL CALCULATED.3IONS-SCNC: 19 MMOL/L (ref 9–16)
ANTI-XA UNFRAC HEPARIN: 0.64 IU/L
BNP SERPL-MCNC: 5887 PG/ML (ref 0–450)
BUN SERPL-MCNC: 41 MG/DL (ref 8–23)
CALCIUM SERPL-MCNC: 9.1 MG/DL (ref 8.6–10.4)
CHLORIDE SERPL-SCNC: 98 MMOL/L (ref 98–107)
CO2 SERPL-SCNC: 16 MMOL/L (ref 20–31)
CREAT SERPL-MCNC: 3.5 MG/DL (ref 0.6–0.9)
ECHO BSA: 1.52 M2
ERYTHROCYTE [DISTWIDTH] IN BLOOD BY AUTOMATED COUNT: 12.7 % (ref 11.8–14.4)
GFR, ESTIMATED: 13 ML/MIN/1.73M2
GLUCOSE BLD-MCNC: 126 MG/DL (ref 65–105)
GLUCOSE BLD-MCNC: 157 MG/DL (ref 65–105)
GLUCOSE BLD-MCNC: 158 MG/DL (ref 65–105)
GLUCOSE SERPL-MCNC: 177 MG/DL (ref 74–99)
HCT VFR BLD AUTO: 37.9 % (ref 36.3–47.1)
HGB BLD-MCNC: 12.5 G/DL (ref 11.9–15.1)
MAGNESIUM SERPL-MCNC: 2.6 MG/DL (ref 1.6–2.4)
MCH RBC QN AUTO: 31.1 PG (ref 25.2–33.5)
MCHC RBC AUTO-ENTMCNC: 33 G/DL (ref 28.4–34.8)
MCV RBC AUTO: 94.3 FL (ref 82.6–102.9)
MICROORGANISM SPEC CULT: NORMAL
MICROORGANISM SPEC CULT: NORMAL
NRBC BLD-RTO: 0 PER 100 WBC
PLATELET # BLD AUTO: 332 K/UL (ref 138–453)
PMV BLD AUTO: 10.6 FL (ref 8.1–13.5)
POTASSIUM SERPL-SCNC: 4.2 MMOL/L (ref 3.7–5.3)
RBC # BLD AUTO: 4.02 M/UL (ref 3.95–5.11)
SERVICE CMNT-IMP: NORMAL
SERVICE CMNT-IMP: NORMAL
SODIUM SERPL-SCNC: 133 MMOL/L (ref 136–145)
SPECIMEN DESCRIPTION: NORMAL
SPECIMEN DESCRIPTION: NORMAL
WBC OTHER # BLD: 11.4 K/UL (ref 3.5–11.3)

## 2025-01-29 PROCEDURE — 6360000002 HC RX W HCPCS: Performed by: PHYSICIAN ASSISTANT

## 2025-01-29 PROCEDURE — 94640 AIRWAY INHALATION TREATMENT: CPT

## 2025-01-29 PROCEDURE — 83735 ASSAY OF MAGNESIUM: CPT

## 2025-01-29 PROCEDURE — 6370000000 HC RX 637 (ALT 250 FOR IP): Performed by: STUDENT IN AN ORGANIZED HEALTH CARE EDUCATION/TRAINING PROGRAM

## 2025-01-29 PROCEDURE — 51798 US URINE CAPACITY MEASURE: CPT

## 2025-01-29 PROCEDURE — 2060000000 HC ICU INTERMEDIATE R&B

## 2025-01-29 PROCEDURE — 99232 SBSQ HOSP IP/OBS MODERATE 35: CPT | Performed by: INTERNAL MEDICINE

## 2025-01-29 PROCEDURE — 94761 N-INVAS EAR/PLS OXIMETRY MLT: CPT

## 2025-01-29 PROCEDURE — 6370000000 HC RX 637 (ALT 250 FOR IP): Performed by: NURSE PRACTITIONER

## 2025-01-29 PROCEDURE — 85520 HEPARIN ASSAY: CPT

## 2025-01-29 PROCEDURE — 83880 ASSAY OF NATRIURETIC PEPTIDE: CPT

## 2025-01-29 PROCEDURE — 82947 ASSAY GLUCOSE BLOOD QUANT: CPT

## 2025-01-29 PROCEDURE — 6370000000 HC RX 637 (ALT 250 FOR IP): Performed by: PHYSICIAN ASSISTANT

## 2025-01-29 PROCEDURE — 80048 BASIC METABOLIC PNL TOTAL CA: CPT

## 2025-01-29 PROCEDURE — 2500000003 HC RX 250 WO HCPCS: Performed by: NURSE PRACTITIONER

## 2025-01-29 PROCEDURE — 6360000002 HC RX W HCPCS: Performed by: INTERNAL MEDICINE

## 2025-01-29 PROCEDURE — 36415 COLL VENOUS BLD VENIPUNCTURE: CPT

## 2025-01-29 PROCEDURE — 6360000002 HC RX W HCPCS: Performed by: NURSE PRACTITIONER

## 2025-01-29 PROCEDURE — 85027 COMPLETE CBC AUTOMATED: CPT

## 2025-01-29 PROCEDURE — 99233 SBSQ HOSP IP/OBS HIGH 50: CPT | Performed by: NURSE PRACTITIONER

## 2025-01-29 PROCEDURE — 99222 1ST HOSP IP/OBS MODERATE 55: CPT | Performed by: STUDENT IN AN ORGANIZED HEALTH CARE EDUCATION/TRAINING PROGRAM

## 2025-01-29 PROCEDURE — 90935 HEMODIALYSIS ONE EVALUATION: CPT | Performed by: INTERNAL MEDICINE

## 2025-01-29 PROCEDURE — 90935 HEMODIALYSIS ONE EVALUATION: CPT

## 2025-01-29 RX ORDER — WATER 10 ML/10ML
INJECTION INTRAMUSCULAR; INTRAVENOUS; SUBCUTANEOUS
Status: DISPENSED
Start: 2025-01-29 | End: 2025-01-29

## 2025-01-29 RX ADMIN — ALTEPLASE 2 MG: 2.2 INJECTION, POWDER, LYOPHILIZED, FOR SOLUTION INTRAVENOUS at 11:36

## 2025-01-29 RX ADMIN — SODIUM CHLORIDE, PRESERVATIVE FREE 10 ML: 5 INJECTION INTRAVENOUS at 21:53

## 2025-01-29 RX ADMIN — AMLODIPINE BESYLATE 5 MG: 5 TABLET ORAL at 08:24

## 2025-01-29 RX ADMIN — GUAIFENESIN 600 MG: 600 TABLET, EXTENDED RELEASE ORAL at 08:24

## 2025-01-29 RX ADMIN — FUROSEMIDE 40 MG: 10 INJECTION, SOLUTION INTRAMUSCULAR; INTRAVENOUS at 16:45

## 2025-01-29 RX ADMIN — SODIUM CHLORIDE, PRESERVATIVE FREE 10 ML: 5 INJECTION INTRAVENOUS at 08:25

## 2025-01-29 RX ADMIN — CARVEDILOL 12.5 MG: 12.5 TABLET, FILM COATED ORAL at 16:45

## 2025-01-29 RX ADMIN — LEVALBUTEROL HYDROCHLORIDE 0.63 MG: 0.63 SOLUTION RESPIRATORY (INHALATION) at 20:59

## 2025-01-29 RX ADMIN — INSULIN GLARGINE 10 UNITS: 100 INJECTION, SOLUTION SUBCUTANEOUS at 21:53

## 2025-01-29 RX ADMIN — ASPIRIN 81 MG 81 MG: 81 TABLET ORAL at 08:24

## 2025-01-29 RX ADMIN — FUROSEMIDE 40 MG: 10 INJECTION, SOLUTION INTRAMUSCULAR; INTRAVENOUS at 08:24

## 2025-01-29 RX ADMIN — GUAIFENESIN 600 MG: 600 TABLET, EXTENDED RELEASE ORAL at 21:52

## 2025-01-29 RX ADMIN — INSULIN GLARGINE 10 UNITS: 100 INJECTION, SOLUTION SUBCUTANEOUS at 08:24

## 2025-01-29 RX ADMIN — HEPARIN SODIUM 14 UNITS/KG/HR: 10000 INJECTION, SOLUTION INTRAVENOUS at 06:06

## 2025-01-29 RX ADMIN — IPRATROPIUM BROMIDE 0.5 MG: 0.5 SOLUTION RESPIRATORY (INHALATION) at 08:39

## 2025-01-29 RX ADMIN — AMLODIPINE BESYLATE 5 MG: 5 TABLET ORAL at 21:53

## 2025-01-29 RX ADMIN — HEPARIN SODIUM 1600 UNITS: 1000 INJECTION INTRAVENOUS; SUBCUTANEOUS at 15:35

## 2025-01-29 RX ADMIN — CARVEDILOL 12.5 MG: 12.5 TABLET, FILM COATED ORAL at 08:24

## 2025-01-29 RX ADMIN — ATORVASTATIN CALCIUM 40 MG: 40 TABLET, FILM COATED ORAL at 21:52

## 2025-01-29 RX ADMIN — IPRATROPIUM BROMIDE 0.5 MG: 0.5 SOLUTION RESPIRATORY (INHALATION) at 20:59

## 2025-01-29 ASSESSMENT — PAIN SCALES - GENERAL
PAINLEVEL_OUTOF10: 0
PAINLEVEL_OUTOF10: 0

## 2025-01-29 NOTE — PROGRESS NOTES
01/29/25 0839   Care Plan - Respiratory Goals   Achieves optimal ventilation and oxygenation Assess for changes in respiratory status;Assess for changes in mentation and behavior;Position to facilitate oxygenation and minimize respiratory effort;Oxygen supplementation based on oxygen saturation or arterial blood gases;Encourage broncho-pulmonary hygiene including cough, deep breathe, incentive spirometry;Assess the need for suctioning and aspirate as needed;Assess and instruct to report shortness of breath or any respiratory difficulty;Respiratory therapy support as indicated

## 2025-01-29 NOTE — PROGRESS NOTES
[facility]  Office: [phone]  Julian Banegas DO, Ronald Drake DO, Олег Weeks DO, José Sanchez DO, Demarco Delgado MD, Chaya Cyr MD, Tommy Madrigal MD, Ness Hernadez MD,  Waldo Jessica MD, Jose G Rice MD, Johanna Laura MD,  Minal Guerra DO, Mady Hurt MD, Fam Jiang MD, Amor Banegas DO, Mayi Moise MD,  Isaac Dickson DO, Iqra Hampton MD, Marianna Bradford MD, Munira Anderson MD, Steph Abernathy MD,  Alexis Powell MD, Heidi Boyd MD, Natan Alexis MD, Ishaan Gant MD, Rod Plasencia MD, Beverly Cooney MD, Hany Yañez DO, Dakota Johnson MD, Minal Boyle MD, Mohsin Reza, MD, Shirley Waterhouse, CNP,  Amanda Molina, CNP, Hany Mcallister, CNP,  Dina Rader, DNP, Angelique Caruso, CNP, Sharon Chavarria, CNP, Geneva Vargas, CNP, Diane Clay, CNP, Natasha Vazquez, PA-C, Yu Mejia, PA-C, Reva Aceves, CNP, Diego Kaplan, CNP,  Pili Ramos, CNP, Valarie Aguilar, CNP, Samantha Castillo, CNP,  Nikki Causey, CNS, Maye Jackman, CNP, Gladys Whitlock, CNP,   Wendy Rubio, CNP         [facility]   IN-PATIENT SERVICE   [facility]    Progress Note    1/29/2025    9:08 AM    Name:   Elham Amado  MRN:     [id number]     Acct:      [id number]   Room:   2027/2027-01  IP Day:  5  Admit Date:  1/24/2025  7:47 PM    PCP:   Thee Kay MD  Code Status:  DNR-CCA    Subjective:     C/C: No chief complaint on file.    Interval History Status: not changed.     Baseline left sided deficits, this am hypoactive and tired post hd yesterday. She was very frustrated during hd as she requested a blanket and didn't receive. She told her son she was so cold and miserable she is considering changing code status and dying.  Anxiolytic helped as per son.   Delaney removed. Went to restroom a few times thus far.  Denies new or worsening symptoms. Breathing stable on room air /2L    No new focal deficits. Patient is at baseline. Son confirms this.

## 2025-01-29 NOTE — PROGRESS NOTES
[facility]  Occupational Therapy Not Seen Note    DATE: 2025    NAME: Elham Amado  MRN: [id number]   : 1945      Patient not seen this date for Occupational Therapy due to:    Hemodialysis:Pt being evaluated for possible CABG by CTS. Will continue to follow for POC and assess when able. Pt off the floor for HD this AM.    Next Scheduled Treatment:       Electronically signed by GRACIELA Adkins on 2025 at 10:21 AM

## 2025-01-29 NOTE — PLAN OF CARE
Problem: Chronic Conditions and Co-morbidities  Goal: Patient's chronic conditions and co-morbidity symptoms are monitored and maintained or improved  1/28/2025 2336 by Adithya Murphy RN  Outcome: Progressing     Problem: Discharge Planning  Goal: Discharge to home or other facility with appropriate resources  1/28/2025 2336 by Adithya Murhpy RN  Outcome: Progressing     Problem: Respiratory - Adult  Goal: Achieves optimal ventilation and oxygenation  1/28/2025 2336 by Adithya Murphy RN  Outcome: Progressing     Problem: Safety - Adult  Goal: Free from fall injury  1/28/2025 2336 by Adithya Murphy RN  Outcome: Progressing     Problem: ABCDS Injury Assessment  Goal: Absence of physical injury  1/28/2025 2336 by Adithya Murphy RN  Outcome: Progressing     Problem: Skin/Tissue Integrity  Goal: Skin integrity remains intact  Description: 1.  Monitor for areas of redness and/or skin breakdown  2.  Assess vascular access sites hourly  3.  Every 4-6 hours minimum:  Change oxygen saturation probe site  4.  Every 4-6 hours:  If on nasal continuous positive airway pressure, respiratory therapy assess nares and determine need for appliance change or resting period  1/28/2025 2336 by Adithya Murphy RN  Outcome: Progressing     Problem: Pain  Goal: Verbalizes/displays adequate comfort level or baseline comfort level  1/28/2025 2336 by Adithya Murphy RN  Outcome: Progressing

## 2025-01-29 NOTE — PROGRESS NOTES
Physical Therapy        Physical Therapy Cancel Note      DATE: 2025    NAME: Elham Amado  MRN: [id number]   : 1945      Patient not seen this date for Physical Therapy due to:    Hemodialysis:      Electronically signed by WAYNE BERGER PTA on 2025 at 10:41 AM

## 2025-01-29 NOTE — PROGRESS NOTES
Dialysis Post Treatment Note  Vitals:    01/29/25 1534   BP: (!) 161/80   Pulse: 68   Resp:    Temp: 98.5 °F (36.9 °C)   SpO2:      Pre-Weight = 54.8 kg  Post-weight = Weight - Scale: 54.2 kg (119 lb 7.8 oz)  Total Liters Processed = Blood Volume Processed (Liters): 59.93 L  Rinseback Volume (mL) = Rinseback Volume (ml): 290 ml  Net Removal (mL) =  0.5L  Patient's dry weight=  Type of access used= R perm cath  Length of treatment=3.5 hrs    Pt tolerated tx and fluid removal fairly. Pt had new perm cath placed yesterday, arterial side was sluggish venous side aspirated and flushed ok, so lines were reversed. Pt ran for about 45 min and then cath because positional and not flowing well.  Pt also began to start spitting up, but would not spit the spit out, would just let it seep out of her mouth. When pt asked if she was ok, she would nod her head up and down. Pt mouth was constantly cleaned with a warm washcloth. Pt cath was activased for 1 hr. After 1 hr, cath was still only running at 300bfr.  Pt order in to get cath replacement for tomorrow.  Floor called and report attempted, but pt rn unavailable. Rn charge notified in case floor rn calls for report

## 2025-01-29 NOTE — PROGRESS NOTES
lifevest on discharge.     Electronically signed by QING GRULLON CNP on 1/29/2025 at 12:01 PM  [facility]  [phone]

## 2025-01-29 NOTE — PROGRESS NOTES
Dialysis Time Out  To be done by RN and tech or 2 RNs  Staff Names Eve Wall RN    [x]  Identity of the patient using 2 patient identifiers  [x]  Consent for treatment  [x]  Equipment-proper machine and dialyzer  [x]  B-Hep B status (neg hep b ag 1/25/25)  [x]  Orders- to include bath, blood flow, dialyzer, time and fluid removal  [x]  Access-Correct site and in working order  [x]  Time for patient to ask questions.      Pt will start [facility] on Monday 2/3/25

## 2025-01-29 NOTE — PLAN OF CARE
Patient is currently in dialysis at this time.  Patient is likely going to be end-stage renal disease at this time.  Continue with preop process for potential bypass surgery.

## 2025-01-29 NOTE — DISCHARGE INSTR - COC
Continuity of Care Form    Patient Name: Elham Amado   :  1945  MRN:  [id number]    Admit date:  2025  Discharge date:  2025    Code Status Order: DNR-CCA   Advance Directives:   Advance Care Flowsheet Documentation             Admitting Physician:  Johanna Laura MD  PCP: Thee Kay MD    Discharging Nurse: NATALIE Hand  Discharging Hospital Unit/Room#:   Discharging Unit Phone Number: [phone]    Emergency Contact:   Extended Emergency Contact Information  Primary Emergency Contact: Heladio Cook  Home Phone: [phone]  Mobile Phone: [phone]  Relation: Child  Preferred language: English   needed? No    Past Surgical History:  Past Surgical History:   Procedure Laterality Date    BACK SURGERY      disc surgery lower back    IR TUNNELED CVC PLACE WO SQ PORT/PUMP > 5 YEARS  2025    IR TUNNELED CVC PLACE WO SQ PORT/PUMP > 5 YEARS 2025 Amor Rodarte MD [facility]    KNEE SURGERY      left knee/ shattered       Immunization History:   Immunization History   Administered Date(s) Administered    Influenza Virus Vaccine 2005, 2007, 2008, 2009, 2010, 2011, 2012, 2018, 10/05/2019    Influenza, FLUBLOK, (age 18 y+), Quadv PF, 0.5mL 10/05/2019    Influenza, FLUZONE High Dose (age 65 y+), IM, Quadv, 0.7mL 10/27/2020    Pneumococcal, PPSV23, PNEUMOVAX 23, (age 2y+), SC/IM, 0.5mL 10/21/2003, 10/21/2003, 2005    Zoster Live (Zostavax) 2012       Active Problems:  Patient Active Problem List   Diagnosis Code    SHANITA (acute kidney injury) (HCC) N17.9    Hyperglycemia due to diabetes mellitus (HCC) E11.65    Acute renal failure with tubular necrosis (HCC) N17.0    Stage 4 chronic kidney disease (HCC) N18.4    Essential hypertension I10    Urinary retention with incomplete bladder emptying R33.9    Hemiparesis affecting left side as late effect of cerebrovascular accident (CVA) (HCC)  17.9 Total Score        Discharging to Facility/ Agency   Name:   Address:  Phone:  Fax:    Dialysis Facility (if applicable)   Name: [facility]   Address: [address] [place] [address] [place] [address]  Dialysis Schedule: MWF @ 1:10pm. Start date 2/3/25. Please arrive 30 minutes early to first appointment to complete paperwork.   Phone: [phone]  Fax: [phone]    / signature: {Esignature:[id number]}    PHYSICIAN SECTION    Prognosis: Guarded    Condition at Discharge: Stable    Rehab Potential (if transferring to Rehab): Guarded    Recommended Labs or Other Treatments After Discharge: DNR-CCA. Follow up PCP follow up nephrology, dialysis     Physician Certification: I certify the above information and transfer of Elham Amado  is necessary for the continuing treatment of the diagnosis listed and that she requires Home Care for less 30 days.     Update Admission H&P: No change in H&P    PHYSICIAN SIGNATURE:  Electronically signed by Johanna Laura MD on 2/4/25 at 11:31 AM EST

## 2025-01-29 NOTE — CARE COORDINATION
Order for wearable cardiac defibrillator noted. Awaiting progress note from [facility] to submit order

## 2025-01-29 NOTE — PROGRESS NOTES
CO2 21 24   BUN 78* 47*   CREATININE 4.2* 3.1*   GLUCOSE 273* 176*   CALCIUM 9.0 9.4     Magnesium:    Recent Labs     01/27/25  0624 01/28/25  0528   MG 2.4 2.2       SPEP:  Lab Results   Component Value Date/Time    ALBCAL 3.6 01/25/2025 02:59 PM    ALBPCT 55 01/25/2025 02:59 PM    A1PCT 5 01/25/2025 02:59 PM    A2PCT 14 01/25/2025 02:59 PM    BETAPCT 13 01/25/2025 02:59 PM    GAMGLOB 0.9 01/25/2025 02:59 PM    GGPCT 13 01/25/2025 02:59 PM    PATH ELECTRONICALLY SIGNED. LIZETTE VUONG M.D. 01/25/2025 02:59 PM       C3:     Lab Results   Component Value Date/Time    C3 145 01/25/2025 03:50 AM     C4:     Lab Results   Component Value Date/Time    C4 31 01/25/2025 03:50 AM     Hep BsAg:         Lab Results   Component Value Date/Time    HEPBSAG NONREACTIVE 01/25/2025 01:51 PM     Hep C AB:          Lab Results   Component Value Date/Time    HEPCAB NONREACTIVE 01/25/2025 01:51 PM       Urinalysis/Chemistries:      Lab Results   Component Value Date/Time    NITRU NEGATIVE 01/25/2025 09:45 AM    COLORU Yellow 01/25/2025 09:45 AM    PHUR 5.5 01/25/2025 09:45 AM    PHUR 6.0 08/26/2023 04:55 AM    WBCUA None 01/25/2025 09:45 AM    RBCUA 2 TO 5 01/25/2025 09:45 AM    MUCUS 1+ 08/06/2023 05:06 PM    YEAST MANY 08/26/2023 04:55 AM    BACTERIA None 01/25/2025 09:45 AM    LEUKOCYTESUR NEGATIVE 01/25/2025 09:45 AM    UROBILINOGEN Normal 01/25/2025 09:45 AM    BILIRUBINUR NEGATIVE 01/25/2025 09:45 AM    GLUCOSEU 3+ 01/25/2025 09:45 AM    KETUA NEGATIVE 01/25/2025 09:45 AM       Urine Chloride:    Lab Results   Component Value Date/Time    CLUR 84 01/25/2025 09:46 AM         Radiology:     Reviewed.     Assessment:       SHANITA, nonoliguric, on CKD stage IV, likely secondary to uncontrolled diabetes, exacerbated by decreased perfusion in setting of new diagnosis heart failure with EF of 25%, proteinuria likely caused CKD to progress quickly. Tunneled cath placed 1/27 with first HD tx same day.   Nephrotic range proteinuria

## 2025-01-29 NOTE — CARE COORDINATION
Hemodialysis Finalized Outpatient Placement Schedule:    Facility: [facility]    Schedule: MWF @ 1:10pm. Start date 2/3/25; please arrive 30 minutes early to first appointment to complete paperwork    Address: [place] [address]    Phone: [phone]    Fax: [phone]    Transport: Per pt's son, he will be providing majority of transport    Discharge Plan: home with home health care    SW met with pt's son to provide treatment schedule. Pt's son presents with understanding and denies further questions or needs at this time. SW available for further support as needed.

## 2025-01-29 NOTE — FLOWSHEET NOTE
Patient still has not voided since removal of galeano catheter last night. Patient denies the urge to void, no suprapubic tenderness noted. Bladder scan done which showed only 42ml. Informed primary team of findings. Care continues.

## 2025-01-29 NOTE — CONSULTS
[facility]   IN-PATIENT SERVICE   [facility]    Inpatient Neurology Consult Note             Date:   1/29/2025  Patient name:  Elham Amado  Date of admission:  1/24/2025  7:47 PM  MRN:   [id number]  Account:  [id number]  YOB: 1945  PCP:    Thee Kay MD  Room:   2027/2027-01  Code Status:    DNR-CCA    Chief Complaint:   Presents 1/24/25 via EMS to [facility] for SOB and Wheezing transferred to [facility] for SHANITA and Acute CHF. Neuro consult 1/29/25 for CABG clearance   History Obtained From:   patient, patients son at bedside(patients primary care taker at baseline), electronic medical record  History of Present Illness:   The patient is a 80 y.o.  Non- / non  female who presents 1/24/25 via EMS to [facility] for SOB and Wheezing transferred to [facility] for SHANITA and Acute CHF. 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 Patient gone for Hemodialysis on my initial attempt to see her around noon today however her son was at bedside and provided patients primary history. Patient lives with her son in her own home however does rely on him for daily assistance. Per son patient had right Basal ganglia cva with residual chronic left sided hemiplegia (Left arm 2/5 at baseline) and left leg 3/5 able to walk without walker short distances. Appears patient has new diagnosis of Renal failure requiring Hemodialysis however is still making urine.     Neurology consulted for CAD with multi vessel disease and pre surgical clearance and risk stratification given history of CVA.     Preoperative Characteristics Point   Age          < 51 years 0         51-65 years 1          66 years 3   Urgent/emergency surgery 1   Peripheral arterial occlusive disease 1   Hisstory of stroke (CVD) 2   CPB time >= 110 min 1     Score* Stroke Risk category       %    0  0.4 Low   1  1.7  MD  1/29/2025  9:18 AM    Copy sent to Thee Chong MD

## 2025-01-30 ENCOUNTER — APPOINTMENT (OUTPATIENT)
Dept: INTERVENTIONAL RADIOLOGY/VASCULAR | Age: 80
DRG: 280 | End: 2025-01-30
Attending: STUDENT IN AN ORGANIZED HEALTH CARE EDUCATION/TRAINING PROGRAM
Payer: MEDICARE

## 2025-01-30 ENCOUNTER — APPOINTMENT (OUTPATIENT)
Dept: MRI IMAGING | Age: 80
DRG: 280 | End: 2025-01-30
Attending: STUDENT IN AN ORGANIZED HEALTH CARE EDUCATION/TRAINING PROGRAM
Payer: MEDICARE

## 2025-01-30 PROBLEM — Z86.73: Status: ACTIVE | Noted: 2025-01-30

## 2025-01-30 PROBLEM — F01.C0 SEVERE VASCULAR DEMENTIA (HCC): Status: ACTIVE | Noted: 2025-01-30

## 2025-01-30 LAB
ALBUMIN SERPL-MCNC: 3.5 G/DL (ref 3.5–5.2)
ALBUMIN/GLOB SERPL: 1.3 {RATIO} (ref 1–2.5)
ALP SERPL-CCNC: 95 U/L (ref 35–104)
ALT SERPL-CCNC: 7 U/L (ref 10–35)
ANION GAP SERPL CALCULATED.3IONS-SCNC: 14 MMOL/L (ref 9–16)
ANTI-XA UNFRAC HEPARIN: 0.3 IU/L
ANTI-XA UNFRAC HEPARIN: 0.78 IU/L
AST SERPL-CCNC: 22 U/L (ref 10–35)
BASOPHILS # BLD: 0.06 K/UL (ref 0–0.2)
BASOPHILS NFR BLD: 1 % (ref 0–2)
BILIRUB DIRECT SERPL-MCNC: 0.2 MG/DL (ref 0–0.2)
BILIRUB INDIRECT SERPL-MCNC: 0.4 MG/DL (ref 0–1)
BILIRUB SERPL-MCNC: 0.6 MG/DL (ref 0–1.2)
BUN SERPL-MCNC: 32 MG/DL (ref 8–23)
CALCIUM SERPL-MCNC: 8.8 MG/DL (ref 8.6–10.4)
CHLORIDE SERPL-SCNC: 95 MMOL/L (ref 98–107)
CO2 SERPL-SCNC: 23 MMOL/L (ref 20–31)
CREAT SERPL-MCNC: 3.1 MG/DL (ref 0.6–0.9)
EOSINOPHIL # BLD: 0.27 K/UL (ref 0–0.44)
EOSINOPHILS RELATIVE PERCENT: 3 % (ref 1–4)
ERYTHROCYTE [DISTWIDTH] IN BLOOD BY AUTOMATED COUNT: 12.4 % (ref 11.8–14.4)
GFR, ESTIMATED: 14 ML/MIN/1.73M2
GLUCOSE BLD-MCNC: 104 MG/DL (ref 65–105)
GLUCOSE BLD-MCNC: 165 MG/DL (ref 65–105)
GLUCOSE BLD-MCNC: 174 MG/DL (ref 65–105)
GLUCOSE BLD-MCNC: 210 MG/DL (ref 65–105)
GLUCOSE SERPL-MCNC: 169 MG/DL (ref 74–99)
HCT VFR BLD AUTO: 35 % (ref 36.3–47.1)
HGB BLD-MCNC: 11.6 G/DL (ref 11.9–15.1)
IMM GRANULOCYTES # BLD AUTO: 0.04 K/UL (ref 0–0.3)
IMM GRANULOCYTES NFR BLD: 0 %
LYMPHOCYTES NFR BLD: 3.17 K/UL (ref 1.1–3.7)
LYMPHOCYTES RELATIVE PERCENT: 31 % (ref 24–43)
MCH RBC QN AUTO: 30.9 PG (ref 25.2–33.5)
MCHC RBC AUTO-ENTMCNC: 33.1 G/DL (ref 28.4–34.8)
MCV RBC AUTO: 93.3 FL (ref 82.6–102.9)
MONOCYTES NFR BLD: 0.72 K/UL (ref 0.1–1.2)
MONOCYTES NFR BLD: 7 % (ref 3–12)
NEUTROPHILS NFR BLD: 58 % (ref 36–65)
NEUTS SEG NFR BLD: 5.97 K/UL (ref 1.5–8.1)
NRBC BLD-RTO: 0 PER 100 WBC
PLATELET # BLD AUTO: 334 K/UL (ref 138–453)
PMV BLD AUTO: 10.4 FL (ref 8.1–13.5)
POTASSIUM SERPL-SCNC: 3.9 MMOL/L (ref 3.7–5.3)
PROT SERPL-MCNC: 6.3 G/DL (ref 6.6–8.7)
RBC # BLD AUTO: 3.75 M/UL (ref 3.95–5.11)
SODIUM SERPL-SCNC: 132 MMOL/L (ref 136–145)
WBC OTHER # BLD: 10.2 K/UL (ref 3.5–11.3)

## 2025-01-30 PROCEDURE — 80053 COMPREHEN METABOLIC PANEL: CPT

## 2025-01-30 PROCEDURE — 99232 SBSQ HOSP IP/OBS MODERATE 35: CPT | Performed by: INTERNAL MEDICINE

## 2025-01-30 PROCEDURE — 6370000000 HC RX 637 (ALT 250 FOR IP): Performed by: INTERNAL MEDICINE

## 2025-01-30 PROCEDURE — 6360000002 HC RX W HCPCS: Performed by: PHYSICIAN ASSISTANT

## 2025-01-30 PROCEDURE — 2500000003 HC RX 250 WO HCPCS: Performed by: NURSE PRACTITIONER

## 2025-01-30 PROCEDURE — 6370000000 HC RX 637 (ALT 250 FOR IP): Performed by: PHYSICIAN ASSISTANT

## 2025-01-30 PROCEDURE — 97166 OT EVAL MOD COMPLEX 45 MIN: CPT

## 2025-01-30 PROCEDURE — 36415 COLL VENOUS BLD VENIPUNCTURE: CPT

## 2025-01-30 PROCEDURE — 36581 REPLACE TUNNELED CV CATH: CPT

## 2025-01-30 PROCEDURE — C1750 CATH, HEMODIALYSIS,LONG-TERM: HCPCS

## 2025-01-30 PROCEDURE — 6370000000 HC RX 637 (ALT 250 FOR IP): Performed by: NURSE PRACTITIONER

## 2025-01-30 PROCEDURE — 85025 COMPLETE CBC W/AUTO DIFF WBC: CPT

## 2025-01-30 PROCEDURE — 82248 BILIRUBIN DIRECT: CPT

## 2025-01-30 PROCEDURE — 82947 ASSAY GLUCOSE BLOOD QUANT: CPT

## 2025-01-30 PROCEDURE — 94761 N-INVAS EAR/PLS OXIMETRY MLT: CPT

## 2025-01-30 PROCEDURE — 99233 SBSQ HOSP IP/OBS HIGH 50: CPT | Performed by: NURSE PRACTITIONER

## 2025-01-30 PROCEDURE — 02H633Z INSERTION OF INFUSION DEVICE INTO RIGHT ATRIUM, PERCUTANEOUS APPROACH: ICD-10-PCS | Performed by: PHYSICIAN ASSISTANT

## 2025-01-30 PROCEDURE — 77001 FLUOROGUIDE FOR VEIN DEVICE: CPT

## 2025-01-30 PROCEDURE — 94640 AIRWAY INHALATION TREATMENT: CPT

## 2025-01-30 PROCEDURE — 6360000002 HC RX W HCPCS: Performed by: NURSE PRACTITIONER

## 2025-01-30 PROCEDURE — 6370000000 HC RX 637 (ALT 250 FOR IP): Performed by: STUDENT IN AN ORGANIZED HEALTH CARE EDUCATION/TRAINING PROGRAM

## 2025-01-30 PROCEDURE — 99232 SBSQ HOSP IP/OBS MODERATE 35: CPT | Performed by: STUDENT IN AN ORGANIZED HEALTH CARE EDUCATION/TRAINING PROGRAM

## 2025-01-30 PROCEDURE — 85520 HEPARIN ASSAY: CPT

## 2025-01-30 PROCEDURE — 2060000000 HC ICU INTERMEDIATE R&B

## 2025-01-30 PROCEDURE — 97535 SELF CARE MNGMENT TRAINING: CPT

## 2025-01-30 PROCEDURE — 97530 THERAPEUTIC ACTIVITIES: CPT

## 2025-01-30 RX ORDER — HEPARIN SODIUM 1000 [USP'U]/ML
INJECTION, SOLUTION INTRAVENOUS; SUBCUTANEOUS PRN
Status: COMPLETED | OUTPATIENT
Start: 2025-01-30 | End: 2025-01-30

## 2025-01-30 RX ADMIN — Medication 3 MG: at 00:15

## 2025-01-30 RX ADMIN — ALPRAZOLAM 0.25 MG: 0.25 TABLET ORAL at 00:15

## 2025-01-30 RX ADMIN — AMLODIPINE BESYLATE 5 MG: 5 TABLET ORAL at 08:34

## 2025-01-30 RX ADMIN — HEPARIN SODIUM 13 UNITS/KG/HR: 10000 INJECTION, SOLUTION INTRAVENOUS at 17:57

## 2025-01-30 RX ADMIN — CARVEDILOL 12.5 MG: 12.5 TABLET, FILM COATED ORAL at 08:34

## 2025-01-30 RX ADMIN — ATORVASTATIN CALCIUM 40 MG: 40 TABLET, FILM COATED ORAL at 20:47

## 2025-01-30 RX ADMIN — LEVALBUTEROL HYDROCHLORIDE 0.63 MG: 0.63 SOLUTION RESPIRATORY (INHALATION) at 21:02

## 2025-01-30 RX ADMIN — Medication 2000 MG: at 14:07

## 2025-01-30 RX ADMIN — IPRATROPIUM BROMIDE 0.5 MG: 0.5 SOLUTION RESPIRATORY (INHALATION) at 21:02

## 2025-01-30 RX ADMIN — Medication 3 MG: at 20:47

## 2025-01-30 RX ADMIN — IPRATROPIUM BROMIDE 0.5 MG: 0.5 SOLUTION RESPIRATORY (INHALATION) at 08:54

## 2025-01-30 RX ADMIN — INSULIN LISPRO 2 UNITS: 100 INJECTION, SOLUTION INTRAVENOUS; SUBCUTANEOUS at 20:47

## 2025-01-30 RX ADMIN — GUAIFENESIN 600 MG: 600 TABLET, EXTENDED RELEASE ORAL at 20:47

## 2025-01-30 RX ADMIN — HEPARIN SODIUM 1600 UNITS: 1000 INJECTION, SOLUTION INTRAVENOUS; SUBCUTANEOUS at 14:12

## 2025-01-30 RX ADMIN — HEPARIN SODIUM 1700 UNITS: 1000 INJECTION, SOLUTION INTRAVENOUS; SUBCUTANEOUS at 14:13

## 2025-01-30 RX ADMIN — CARVEDILOL 12.5 MG: 12.5 TABLET, FILM COATED ORAL at 17:54

## 2025-01-30 RX ADMIN — LEVALBUTEROL HYDROCHLORIDE 0.63 MG: 0.63 SOLUTION RESPIRATORY (INHALATION) at 08:54

## 2025-01-30 RX ADMIN — AMLODIPINE BESYLATE 5 MG: 5 TABLET ORAL at 20:47

## 2025-01-30 RX ADMIN — HEPARIN SODIUM 13 UNITS/KG/HR: 10000 INJECTION, SOLUTION INTRAVENOUS at 22:56

## 2025-01-30 RX ADMIN — GUAIFENESIN 600 MG: 600 TABLET, EXTENDED RELEASE ORAL at 08:34

## 2025-01-30 RX ADMIN — ACETAMINOPHEN 325 MG: 325 TABLET ORAL at 11:00

## 2025-01-30 RX ADMIN — FUROSEMIDE 40 MG: 10 INJECTION, SOLUTION INTRAMUSCULAR; INTRAVENOUS at 08:34

## 2025-01-30 RX ADMIN — INSULIN GLARGINE 10 UNITS: 100 INJECTION, SOLUTION SUBCUTANEOUS at 20:47

## 2025-01-30 ASSESSMENT — PAIN SCALES - GENERAL
PAINLEVEL_OUTOF10: 0
PAINLEVEL_OUTOF10: 0
PAINLEVEL_OUTOF10: 4
PAINLEVEL_OUTOF10: 1

## 2025-01-30 NOTE — PROGRESS NOTES
[facility]   Progress Note                   Date:   1/30/2025  Patient name: Elham Amado  Date of admission:  1/24/2025  7:47 PM  MRN:   [id number]  YOB: 1945  PCP: Thee Kay MD    Reason for Admission: Congestive heart failure with unknown left ventricular ejection fraction (HCC) [I50.9]    Subjective:       Clinical Changes / Abnormalities:Pt seen and examined in the room Pt denies any CP or sob today.  .  Labs, vitals and tele reviewed.       Medications:   Scheduled Meds:   amLODIPine  5 mg Oral BID    carvedilol  12.5 mg Oral BID WC    insulin glargine  10 Units SubCUTAneous BID    ipratropium  0.5 mg Nebulization BID    levalbuterol  0.63 mg Nebulization BID RT    sodium chloride flush  5-40 mL IntraVENous 2 times per day    furosemide  40 mg IntraVENous BID    insulin lispro  0-8 Units SubCUTAneous 4x Daily AC & HS    aspirin  81 mg Oral Daily    atorvastatin  40 mg Oral Nightly    guaiFENesin  600 mg Oral BID     Continuous Infusions:   sodium chloride      dextrose      heparin (PORCINE) Infusion 13 Units/kg/hr (01/30/25 0753)     CBC:   Recent Labs     01/29/25  0840   WBC 11.4*   HGB 12.5        BMP:    Recent Labs     01/28/25  0528 01/29/25  0840    133*   K 3.7 4.2   CL 99 98   CO2 24 16*   BUN 47* 41*   CREATININE 3.1* 3.5*   GLUCOSE 176* 177*     Hepatic:   No results for input(s): \"AST\", \"ALT\", \"BILITOT\", \"ALKPHOS\" in the last 72 hours.    Invalid input(s): \"ALB\"    Troponin:   No results for input(s): \"TROPHS\" in the last 72 hours.    BNP: No results for input(s): \"BNP\" in the last 72 hours.  Lipids:   No results for input(s): \"CHOL\", \"HDL\" in the last 72 hours.    Invalid input(s): \"LDLCALCU\"    INR:   No results for input(s): \"INR\" in the last 72 hours.      EKG:   Normal sinus rhythm, no acute ST-T wave changes      ECHO: 1/24/2025    Left Ventricle: Severely reduced left ventricular systolic function. EF by 2D Simpsons Biplane is 25%.  auscultation  Heart: Regular rate and rhythm, s1/s2 auscultated, no murmurs  Abdomen: soft, non-tender, bowel sounds active  Extremities: no edema  Neurologic: not done        Assessment / Acute Cardiac Problems:   Acute systolic CHF  New onset cardiomyopathy, LVEF 25% on TTE 1/24/2025  NSTEMI, elevated troponin -106, Type I versus type II, SHANITA on CKD, CHF  Acute hypoxic respiratory failure, secondary to acute CHF and PNA  Pneumonia with COPD exacerbation  SHANITA on CKD  HTN, HLD  Type II DM with hyperglycemia, poorly controlled  Hx of CVA        Plan of Treatment:   Cath shows MVD.  CTS consulted for possible CABG..  Possible cath conference on Monday   Continue ASA, statin,   IV heparin   HTN.  Improving. Continue bb and CCB   Fluid volume per HD and appreciate nephrology assistance.    Reduced EF.  Will likely need lifevest on discharge.     Electronically signed by QING GRULLON CNP on 1/30/2025 at 10:54 AM  [facility]  [phone]

## 2025-01-30 NOTE — PLAN OF CARE
Problem: Chronic Conditions and Co-morbidities  Goal: Patient's chronic conditions and co-morbidity symptoms are monitored and maintained or improved  Outcome: Progressing  Flowsheets (Taken 1/29/2025 2130)  Care Plan - Patient's Chronic Conditions and Co-Morbidity Symptoms are Monitored and Maintained or Improved:   Monitor and assess patient's chronic conditions and comorbid symptoms for stability, deterioration, or improvement   Collaborate with multidisciplinary team to address chronic and comorbid conditions and prevent exacerbation or deterioration   Update acute care plan with appropriate goals if chronic or comorbid symptoms are exacerbated and prevent overall improvement and discharge     Problem: Discharge Planning  Goal: Discharge to home or other facility with appropriate resources  Outcome: Progressing  Flowsheets (Taken 1/29/2025 2130)  Discharge to home or other facility with appropriate resources:   Identify barriers to discharge with patient and caregiver   Arrange for needed discharge resources and transportation as appropriate   Identify discharge learning needs (meds, wound care, etc)   Refer to discharge planning if patient needs post-hospital services based on physician order or complex needs related to functional status, cognitive ability or social support system     Problem: Respiratory - Adult  Goal: Achieves optimal ventilation and oxygenation  Outcome: Progressing  Flowsheets  Taken 1/29/2025 2130 by Yasmin Simon RN  Achieves optimal ventilation and oxygenation:   Assess for changes in respiratory status   Assess for changes in mentation and behavior  Taken 1/29/2025 2059 by Terri Leyva RCP  Achieves optimal ventilation and oxygenation:   Assess for changes in respiratory status   Assess for changes in mentation and behavior   Position to facilitate oxygenation and minimize respiratory effort   Oxygen supplementation based on oxygen saturation or arterial blood gases   Encourage  breakdown   Assess vascular access sites hourly   Every 4-6 hours minimum: Change oxygen saturation probe site     Problem: Pain  Goal: Verbalizes/displays adequate comfort level or baseline comfort level  Outcome: Progressing  Flowsheets (Taken 1/29/2025 2130)  Verbalizes/displays adequate comfort level or baseline comfort level:   Encourage patient to monitor pain and request assistance   Assess pain using appropriate pain scale   Administer analgesics based on type and severity of pain and evaluate response   Implement non-pharmacological measures as appropriate and evaluate response   Notify Licensed Independent Practitioner if interventions unsuccessful or patient reports new pain     Problem: Neurosensory - Adult  Goal: Remains free of injury related to seizures activity  Outcome: Progressing     Problem: Skin/Tissue Integrity - Adult  Goal: Skin integrity remains intact  Description: 1.  Monitor for areas of redness and/or skin breakdown  2.  Assess vascular access sites hourly  3.  Every 4-6 hours minimum:  Change oxygen saturation probe site  4.  Every 4-6 hours:  If on nasal continuous positive airway pressure, respiratory therapy assess nares and determine need for appliance change or resting period  Outcome: Progressing  Flowsheets  Taken 1/29/2025 2130  Skin Integrity Remains Intact:   Monitor for areas of redness and/or skin breakdown   Assess vascular access sites hourly  Taken 1/29/2025 2000  Skin Integrity Remains Intact:   Monitor for areas of redness and/or skin breakdown   Assess vascular access sites hourly   Every 4-6 hours minimum: Change oxygen saturation probe site  Goal: Incisions, wounds, or drain sites healing without S/S of infection  Outcome: Progressing     Problem: Musculoskeletal - Adult  Goal: Return mobility to safest level of function  Outcome: Progressing     Problem: Genitourinary - Adult  Goal: Absence of urinary retention  Outcome: Progressing     Problem: Metabolic/Fluid and

## 2025-01-30 NOTE — CARE COORDINATION
Evaluated the cardiac cath and report Dr. Martinez.  We appreciate neurology's input regarding risk of stroke with surgery.  At this time we would like patient to be presented at cardiac cath conference.  They are concerned about getting the OM target as a bypass target.  Patient may be LIMA to LAD as a single-vessel bypass patient deemed high chance of stroke.  Await further MRI imaging ordered by neurology at this time.  We will round on patient in the morning and have plan.  If plan cannot be determined tomorrow we will want patient presented at cardiac cath conference.

## 2025-01-30 NOTE — PROGRESS NOTES
Physical Therapy        Physical Therapy Cancel Note      DATE: 2025    NAME: Elham Amado  MRN: [id number]   : 1945      Patient not seen this date for Physical Therapy due to:    Surgery/Procedure: Pt out of room to REPLACE TUNNELED CVC WO SQ PORT/PUMP SAME ACCESS scheduled this date. Will check back tomorrow, , as time allows.    Electronically signed by Stas Bruno PTA on 2025 at 3:24 PM

## 2025-01-30 NOTE — BRIEF OP NOTE
Brief Postoperative Note    Elham Amado  YOB: 1945  [id number]    Pre-operative Diagnosis: Chronic Renal Failure/Malfunctioning HD Catheter    Post-operative Diagnosis: Same    Procedure: Tunneled HD Catheter Exchange    Anesthesia: Local 1% Lidocaine    Medications Given: none    Surgeons/Assistants: AYESHA Morales    Estimated Blood Loss: Minimal    Complications: none    Specimens: were not obtained    Tunneled HD catheter exchanged for new 14Fr x 19 cm tip to cuff splitcath HD catheter.  Dressing applied.  May use HD catheter for dialysis.  Catheter locked with heparin.  Vitals signs were reviewed and were stable after the procedure.      Electronically signed by AYESHA Morales on 1/30/2025 at 2:23 PM

## 2025-01-30 NOTE — PROGRESS NOTES
01/29/25 2059   Care Plan - Respiratory Goals   Achieves optimal ventilation and oxygenation Assess for changes in respiratory status;Assess for changes in mentation and behavior;Position to facilitate oxygenation and minimize respiratory effort;Oxygen supplementation based on oxygen saturation or arterial blood gases;Encourage broncho-pulmonary hygiene including cough, deep breathe, incentive spirometry;Assess the need for suctioning and aspirate as needed;Assess and instruct to report shortness of breath or any respiratory difficulty;Respiratory therapy support as indicated

## 2025-01-30 NOTE — PROGRESS NOTES
[facility]   IN-PATIENT SERVICE   [facility]    Progress note             Date:   1/30/2025  Patient name:  Elham Amado  Date of admission:  1/24/2025  7:47 PM  MRN:   [id number]  Account:  [id number]  YOB: 1945  PCP:    Thee Kay MD  Room:   2027/2027-01  Code Status:    DNR-CCA    Chief Complaint:   Presents 1/24/25 via EMS to [place] [facility] for SOB and Wheezing transferred to [facility] for SHANITA and Acute CHF. Neuro consult 1/29/25 for CABG clearance   Interval hx:   The Patient was seen and examined at bedside  Is vitally stable -161, HR 64-72, RR 18-22 FIO2 30 on Bipap SpO2-91-96, afebrile tmax 98.6  alert oriented x3  No acute events overnight  Patient much more awake alert and oriented as compared to yesterday evening. Did undergo IR guided tunnel cath replacement for concern of occluded/failure of previous line. Otherwise much improving mentation and mentation as compared to yesterday after I evaluated her post HD treatment in evening around 6PM. Unfortunately MRI brain and MRA head WO are still not yet completed. I did discuss with nursing and MRI Checklist completed and MRI was planning for transport this evening however still not yet back as of 10:20PM and is not in process to support patient ever went this evening fully limiting any informed preoperative clearance for CABG.     MRI brain WO 1/30/25-still not yet completed as of 10:00PM 1/30/25    MRA head WO 1/30/25-Still not yet completed as of 10:00 PM 1/30/25    Brief History of Present Illness:   The patient is a 80 y.o.  Non- / non  female who presents 1/24/25 via EMS to [place] [facility] for SOB and Wheezing transferred to [facility] for SHANITA and Acute CHF. 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 Patient gone for Hemodialysis on my initial attempt to see her around noon today however her    -is on lantus 40 nightly at home      PT/OT  DVT ppx- is on heparin for NSTEMI and acute renal failure             The plan was discussed with the patient, patient's family and the medical staff.   Consultations:   IP CONSULT TO HEART FAILURE NURSE/COORDINATOR  IP CONSULT TO DIETITIAN  IP CONSULT TO NEPHROLOGY  IP CONSULT TO CARDIOLOGY  IP CONSULT TO VASCULAR ACCESS TEAM  IP CONSULT TO NEUROLOGY    Patient is admitted as inpatient status because of co-morbidities listed above, severity of signs and symptoms as outlined, requirement for current medical therapies and most importantly because of direct risk to patient if care not provided in a hospital setting.    Kumar Montez MD  1/30/2025  9:35 AM    Copy sent to Thee Chong MD

## 2025-01-30 NOTE — PROGRESS NOTES
Renal Progress Note    Patient :  Elham Amado; 80 y.o. MRN# [id number]  Location:  2027/2027-01  Attending:  Lisa Correa MD  Admit Date:  1/24/2025   Hospital Day: 6    Subjective:     Patient was seen and examined.  Patient was lying flat.  She was alert and awake.  Her son was present at the bedside.  Yesterday her dialysis was complicated by poor catheter function and she was having flow of 300 after using Cathflo.  Patient is for catheter exchange today.  Next    Renal workup reviewed.  Renal ultrasound demonstrates right kidney measuring 8.9 cm, left kidney measuring 8.3 cm, increased cortical echogenicity bilaterally, small cyst noted along the upper pole of the right kidney, no hydronephrosis or intrarenal stones.     Urine chemistry reviewed, urine chloride 84, urine sodium 79, UPC 3.59.    Serologies reviewed, SPEP pending, free light chain ratio 0.47, complements within normal limits, hepatitis panel nonreactive.    Brief History reviewed.  80-year-old female with past medical history of CKD stage IV, uncontrolled type 2 diabetes, hypertension, hyperlipidemia, and CVA who presented to the hospital with chief complaint of shortness of breath, initially found to have elevated BNP of 16,316 and bedside echo demonstrated EF of 25%.  Initial lab work also demonstrated significantly elevated creatinine of 3.4 with GFR of 13.  Patient has history of noncompliance and was last seen by our service in August 2023 due to SHANITA.  At that time her baseline creatinine was deemed to be around 1.8.  Nephrology was consulted for SHANITA on CKD stage IV.      Current Medications:     Scheduled Meds:    amLODIPine  5 mg Oral BID    carvedilol  12.5 mg Oral BID WC    insulin glargine  10 Units SubCUTAneous BID    ipratropium  0.5 mg Nebulization BID    levalbuterol  0.63 mg Nebulization BID RT    sodium chloride flush  5-40 mL IntraVENous 2 times per day    insulin lispro  0-8 Units SubCUTAneous 4x Daily AC & HS    aspirin  81  mg Oral Daily    atorvastatin  40 mg Oral Nightly    guaiFENesin  600 mg Oral BID     Input/Output:       I/O last 3 completed shifts:  In: 450 [P.O.:150]  Out: 885 [Urine:85].    Patient Vitals for the past 96 hrs (Last 3 readings):   Weight   25 0600 56.1 kg (123 lb 10.9 oz)   25 1534 54.2 kg (119 lb 7.8 oz)   25 1023 54.8 kg (120 lb 13 oz)       Vital Signs:   Temperature:  Temp: 98.4 °F (36.9 °C)  TMax:   Temp (24hrs), Av.4 °F (36.9 °C), Min:98 °F (36.7 °C), Max:98.6 °F (37 °C)    Respirations:  Respirations: 18  Pulse:   Pulse: 78  BP:    BP: 130/67  BP Range: Systolic (24hrs), Av , Min:121 , Max:161       Diastolic (24hrs), Av, Min:43, Max:85      Physical Examination:     General:  Alert awake oriented x 3, NAD.  HEENT: Atraumatic and normocephalic.  Eyes:   PERRLA  Neck:   Supple  Chest:   Bilateral air entry and clear  Cardiac:  S1 S2 RR, no murmurs, gallops or rubs.  Abdomen: Soft, non-tender, no masses or organomegaly, BS audible.  :   No suprapubic or flank tenderness, Delaney catheter in place.  Neuro:  Alert and oriented x2  SKIN:  No rashes, abrasions, or ecchymoses, good skin turgor.  Extremities:  Trace edema    Labs:       Recent Labs     25  0840 25  1304   WBC 11.4* 10.2   RBC 4.02 3.75*   HGB 12.5 11.6*   HCT 37.9 35.0*   MCV 94.3 93.3   MCH 31.1 30.9   MCHC 33.0 33.1   RDW 12.7 12.4    334   MPV 10.6 10.4      BMP:   Recent Labs     25  0528 25  0840    133*   K 3.7 4.2   CL 99 98   CO2 24 16*   BUN 47* 41*   CREATININE 3.1* 3.5*   GLUCOSE 176* 177*   CALCIUM 9.4 9.1     Magnesium:    Recent Labs     25  0528 25  0840   MG 2.2 2.6*       SPEP:  Lab Results   Component Value Date/Time    ALBCAL 3.6 2025 02:59 PM    ALBPCT 55 2025 02:59 PM    A1PCT 5 2025 02:59 PM    A2PCT 14 2025 02:59 PM    BETAPCT 13 2025 02:59 PM    GAMGLOB 0.9 2025 02:59 PM    GGPCT 13 2025 02:59 PM

## 2025-01-30 NOTE — PROGRESS NOTES
Occupational Therapy    Occupational Therapy Initial Evaluation  Facility/Department: [facility] CAR 2- STEPDOWN   Patient Name: Elham Amado        MRN: [id number]    : 1945    Date of Service: 2025    Past Medical History:  has a past medical history of Anxiety, Cervicalgia, Diabetes mellitus (HCC), Dorsalgia, Hemiplegia (HCC), Hypertension, Mixed hyperlipidemia, Osteoporosis, and Stroke (cerebrum) (HCC).  Past Surgical History:  has a past surgical history that includes back surgery; knee surgery; IR TUNNELED CVC PLACE WO SQ PORT/PUMP > 5 YEARS (2025); Cardiac procedure (N/A, 2025); and invasive vascular (Bilateral, 2025).    Discharge Recommendations  Discharge Recommendations: Continue to assess pending progress, Patient would benefit from continued therapy after discharge       Assessment  Performance deficits / Impairments: Decreased functional mobility ;Decreased ADL status;Decreased ROM;Decreased strength;Decreased safe awareness;Decreased cognition;Decreased endurance;Decreased balance;Decreased coordination  Assessment: Patient is normaly independent with functional mobility, personal ADLs and household tasks. At this time patient requires up to min assist with functional mobility and up to min assist with UB ADLs, up to max assist with LB ADLs, suspect fatigue affecting performance this date and stanley continue to assess performance each session. Patient would benefit from continued therapy during stay and after discharge from acute setting.  Demonstrates need for 24/7 skilled assistance to safely perform personal ADLs and mobility.  Prognosis: Good  Decision Making: Medium Complexity  REQUIRES OT FOLLOW-UP: Yes  Activity Tolerance  Activity Tolerance: Patient Tolerated treatment well;Patient limited by fatigue;Treatment limited secondary to decreased cognition  Safety Devices  Type of Devices: Call light within reach;Gait belt;Left in bed;Bed alarm in place;Nurse

## 2025-01-30 NOTE — PROGRESS NOTES
01/30/25 0857   Care Plan - Respiratory Goals   Achieves optimal ventilation and oxygenation Assess for changes in respiratory status;Assess for changes in mentation and behavior;Position to facilitate oxygenation and minimize respiratory effort;Oxygen supplementation based on oxygen saturation or arterial blood gases;Encourage broncho-pulmonary hygiene including cough, deep breathe, incentive spirometry;Assess the need for suctioning and aspirate as needed;Assess and instruct to report shortness of breath or any respiratory difficulty;Respiratory therapy support as indicated

## 2025-01-31 ENCOUNTER — APPOINTMENT (OUTPATIENT)
Dept: MRI IMAGING | Age: 80
DRG: 280 | End: 2025-01-31
Attending: STUDENT IN AN ORGANIZED HEALTH CARE EDUCATION/TRAINING PROGRAM
Payer: MEDICARE

## 2025-01-31 ENCOUNTER — APPOINTMENT (OUTPATIENT)
Dept: DIALYSIS | Age: 80
DRG: 280 | End: 2025-01-31
Attending: STUDENT IN AN ORGANIZED HEALTH CARE EDUCATION/TRAINING PROGRAM
Payer: MEDICARE

## 2025-01-31 LAB
ANION GAP SERPL CALCULATED.3IONS-SCNC: 16 MMOL/L (ref 9–16)
ANTI-XA UNFRAC HEPARIN: 0.31 IU/L
ANTI-XA UNFRAC HEPARIN: 0.59 IU/L
BUN SERPL-MCNC: 50 MG/DL (ref 8–23)
CALCIUM SERPL-MCNC: 9.2 MG/DL (ref 8.6–10.4)
CHLORIDE SERPL-SCNC: 96 MMOL/L (ref 98–107)
CO2 SERPL-SCNC: 24 MMOL/L (ref 20–31)
CREAT SERPL-MCNC: 3.7 MG/DL (ref 0.6–0.9)
GFR, ESTIMATED: 12 ML/MIN/1.73M2
GLUCOSE BLD-MCNC: 158 MG/DL (ref 65–105)
GLUCOSE BLD-MCNC: 196 MG/DL (ref 65–105)
GLUCOSE BLD-MCNC: 247 MG/DL (ref 65–105)
GLUCOSE BLD-MCNC: 290 MG/DL (ref 65–105)
GLUCOSE SERPL-MCNC: 162 MG/DL (ref 74–99)
POTASSIUM SERPL-SCNC: 3.4 MMOL/L (ref 3.7–5.3)
POTASSIUM SERPL-SCNC: 4 MMOL/L (ref 3.7–5.3)
SODIUM SERPL-SCNC: 136 MMOL/L (ref 136–145)

## 2025-01-31 PROCEDURE — 80048 BASIC METABOLIC PNL TOTAL CA: CPT

## 2025-01-31 PROCEDURE — 6360000002 HC RX W HCPCS: Performed by: PHYSICIAN ASSISTANT

## 2025-01-31 PROCEDURE — 94640 AIRWAY INHALATION TREATMENT: CPT

## 2025-01-31 PROCEDURE — 6370000000 HC RX 637 (ALT 250 FOR IP): Performed by: STUDENT IN AN ORGANIZED HEALTH CARE EDUCATION/TRAINING PROGRAM

## 2025-01-31 PROCEDURE — 6370000000 HC RX 637 (ALT 250 FOR IP): Performed by: NURSE PRACTITIONER

## 2025-01-31 PROCEDURE — 97530 THERAPEUTIC ACTIVITIES: CPT

## 2025-01-31 PROCEDURE — 6360000002 HC RX W HCPCS: Performed by: INTERNAL MEDICINE

## 2025-01-31 PROCEDURE — 90935 HEMODIALYSIS ONE EVALUATION: CPT

## 2025-01-31 PROCEDURE — 94761 N-INVAS EAR/PLS OXIMETRY MLT: CPT

## 2025-01-31 PROCEDURE — 6370000000 HC RX 637 (ALT 250 FOR IP): Performed by: PHYSICIAN ASSISTANT

## 2025-01-31 PROCEDURE — 36415 COLL VENOUS BLD VENIPUNCTURE: CPT

## 2025-01-31 PROCEDURE — 70551 MRI BRAIN STEM W/O DYE: CPT

## 2025-01-31 PROCEDURE — 99233 SBSQ HOSP IP/OBS HIGH 50: CPT | Performed by: NURSE PRACTITIONER

## 2025-01-31 PROCEDURE — 99232 SBSQ HOSP IP/OBS MODERATE 35: CPT | Performed by: STUDENT IN AN ORGANIZED HEALTH CARE EDUCATION/TRAINING PROGRAM

## 2025-01-31 PROCEDURE — 84132 ASSAY OF SERUM POTASSIUM: CPT

## 2025-01-31 PROCEDURE — 90935 HEMODIALYSIS ONE EVALUATION: CPT | Performed by: INTERNAL MEDICINE

## 2025-01-31 PROCEDURE — 85520 HEPARIN ASSAY: CPT

## 2025-01-31 PROCEDURE — 70544 MR ANGIOGRAPHY HEAD W/O DYE: CPT

## 2025-01-31 PROCEDURE — 82947 ASSAY GLUCOSE BLOOD QUANT: CPT

## 2025-01-31 PROCEDURE — 2060000000 HC ICU INTERMEDIATE R&B

## 2025-01-31 PROCEDURE — 2500000003 HC RX 250 WO HCPCS: Performed by: NURSE PRACTITIONER

## 2025-01-31 RX ADMIN — INSULIN LISPRO 2 UNITS: 100 INJECTION, SOLUTION INTRAVENOUS; SUBCUTANEOUS at 20:21

## 2025-01-31 RX ADMIN — AMLODIPINE BESYLATE 5 MG: 5 TABLET ORAL at 14:22

## 2025-01-31 RX ADMIN — LEVALBUTEROL HYDROCHLORIDE 0.63 MG: 0.63 SOLUTION RESPIRATORY (INHALATION) at 07:59

## 2025-01-31 RX ADMIN — ATORVASTATIN CALCIUM 40 MG: 40 TABLET, FILM COATED ORAL at 20:21

## 2025-01-31 RX ADMIN — IPRATROPIUM BROMIDE 0.5 MG: 0.5 SOLUTION RESPIRATORY (INHALATION) at 07:59

## 2025-01-31 RX ADMIN — SODIUM CHLORIDE, PRESERVATIVE FREE 10 ML: 5 INJECTION INTRAVENOUS at 14:57

## 2025-01-31 RX ADMIN — CARVEDILOL 12.5 MG: 12.5 TABLET, FILM COATED ORAL at 14:22

## 2025-01-31 RX ADMIN — GUAIFENESIN 600 MG: 600 TABLET, EXTENDED RELEASE ORAL at 14:22

## 2025-01-31 RX ADMIN — INSULIN LISPRO 4 UNITS: 100 INJECTION, SOLUTION INTRAVENOUS; SUBCUTANEOUS at 18:13

## 2025-01-31 RX ADMIN — INSULIN GLARGINE 10 UNITS: 100 INJECTION, SOLUTION SUBCUTANEOUS at 14:55

## 2025-01-31 RX ADMIN — HEPARIN SODIUM 1600 UNITS: 1000 INJECTION INTRAVENOUS; SUBCUTANEOUS at 13:45

## 2025-01-31 RX ADMIN — GUAIFENESIN 600 MG: 600 TABLET, EXTENDED RELEASE ORAL at 20:21

## 2025-01-31 RX ADMIN — INSULIN GLARGINE 10 UNITS: 100 INJECTION, SOLUTION SUBCUTANEOUS at 20:21

## 2025-01-31 RX ADMIN — AMLODIPINE BESYLATE 5 MG: 5 TABLET ORAL at 20:21

## 2025-01-31 RX ADMIN — ASPIRIN 81 MG 81 MG: 81 TABLET ORAL at 14:22

## 2025-01-31 ASSESSMENT — PAIN SCALES - GENERAL: PAINLEVEL_OUTOF10: 0

## 2025-01-31 NOTE — PROGRESS NOTES
Physical Therapy  Facility/Department: [facility] CAR 2- STEPDOWN   Physical Therapy Daily Treatment Note    Patient Name: Elham Amado        MRN: [id number]    : 1945    Date of Service: 2025      Past Medical History:  has a past medical history of Anxiety, Cervicalgia, Diabetes mellitus (HCC), Dorsalgia, Hemiplegia (HCC), Hypertension, Mixed hyperlipidemia, Osteoporosis, and Stroke (cerebrum) (HCC).  Past Surgical History:  has a past surgical history that includes back surgery; knee surgery; IR TUNNELED CVC PLACE WO SQ PORT/PUMP > 5 YEARS (2025); Cardiac procedure (N/A, 2025); and invasive vascular (Bilateral, 2025).    Discharge Recommendations  Discharge Recommendations: Patient would benefit from continued therapy after discharge  PT Equipment Recommendations  Equipment Needed: No (pt reports owning a quad cane)    Assessment  Body Structures, Functions, Activity Limitations Requiring Skilled Therapeutic Intervention: Decreased functional mobility ;Decreased strength;Decreased endurance;Decreased balance;Decreased ROM  Assessment: Pt with mobility deficits requiring mod-A to ambulate 25 feet with a SBCQ.  Pt is mildly confused throughout today's session requiring cues to sequence most tasks.  Pt is currently a high fall risk and would benefit from additional PT upon discharge to further address impaired balance, coordination, BLE strength and endurance.  Pt will require 24 hour assistance with mobility upon discharge.  Therapy Prognosis: Good  Requires PT Follow-Up: Yes  Activity Tolerance  Activity Tolerance: Patient limited by endurance;Patient limited by fatigue  Safety Devices  Type of Devices: Gait belt;Left in bed;Nurse notified;Patient at risk for falls;All fall risk precautions in place (left with transport)  Restraints  Restraints Initially in Place: No    AM-PAC  AM-PAC Basic Mobility - Inpatient   How much help is needed turning from your back to your side while in a flat bed

## 2025-01-31 NOTE — CARE COORDINATION
Met with the patients son. States he is uncertain if he wants the patient to have an external defibrillator. Writer educated him on the device and product information provided. Discussed IPR and a possible next step for the patient. List provided.                       Post Acute Facility/Agency List     Provided  the patients son  with the following list, the list includes the overall star ratings obtained from CMS per the Medicare Web site (www.Medicare.gov):     [] Long Term Acute Care Facilities  [x] Acute Inpatient Rehabilitation Facilities  [] Skilled Nursing Facilities  [] Hospice Facilities  [] Home Care    Provided verbal instructions on how to utilize the QR Code to obtain additional detailed star ratings from www.Medicare.gov     offered to print and provide the detailed list:    []Accepted   [x]Declined

## 2025-01-31 NOTE — PROGRESS NOTES
Renal Progress Note    Patient :  Elham Amado; 80 y.o. MRN# [id number]  Location:  2027/2027-01  Attending:  Lisa Correa MD  Admit Date:  1/24/2025   Hospital Day: 7    Subjective:     Patient was seen and examined.  Patient was lying comfortably and dialysis was in progress.  Patient remains hemodynamically stable.  She denies any cramping.  Decision from cardiothoracic surgery noted.  Blood pressure remains under good control.  She is tolerating ultrafiltration of 1 L.    Renal workup reviewed.  Renal ultrasound demonstrates right kidney measuring 8.9 cm, left kidney measuring 8.3 cm, increased cortical echogenicity bilaterally, small cyst noted along the upper pole of the right kidney, no hydronephrosis or intrarenal stones.     Urine chemistry reviewed, urine chloride 84, urine sodium 79, UPC 3.59.    Serologies reviewed, SPEP pending, free light chain ratio 0.47, complements within normal limits, hepatitis panel nonreactive.    Brief History reviewed.  80-year-old female with past medical history of CKD stage IV, uncontrolled type 2 diabetes, hypertension, hyperlipidemia, and CVA who presented to the hospital with chief complaint of shortness of breath, initially found to have elevated BNP of 16,316 and bedside echo demonstrated EF of 25%.  Initial lab work also demonstrated significantly elevated creatinine of 3.4 with GFR of 13.  Patient has history of noncompliance and was last seen by our service in August 2023 due to SHANITA.  At that time her baseline creatinine was deemed to be around 1.8.  Nephrology was consulted for SHANITA on CKD stage IV.      Current Medications:     Scheduled Meds:    amLODIPine  5 mg Oral BID    carvedilol  12.5 mg Oral BID WC    insulin glargine  10 Units SubCUTAneous BID    ipratropium  0.5 mg Nebulization BID    levalbuterol  0.63 mg Nebulization BID RT    sodium chloride flush  5-40 mL IntraVENous 2 times per day    insulin lispro  0-8 Units SubCUTAneous 4x Daily AC & HS

## 2025-01-31 NOTE — PROGRESS NOTES
[facility]   Progress Note                   Date:   1/31/2025  Patient name: Elham Amado  Date of admission:  1/24/2025  7:47 PM  MRN:   [id number]  YOB: 1945  PCP: Thee Kay MD    Reason for Admission: Congestive heart failure with unknown left ventricular ejection fraction (HCC) [I50.9]    Subjective:       Clinical Changes / Abnormalities: Pt seen and examined in the room Pt denies any CP or sob today.  Labs, vitals and tele reviewed. Had tunneled cath changed out yesterday.       Medications:   Scheduled Meds:   amLODIPine  5 mg Oral BID    carvedilol  12.5 mg Oral BID WC    insulin glargine  10 Units SubCUTAneous BID    ipratropium  0.5 mg Nebulization BID    levalbuterol  0.63 mg Nebulization BID RT    sodium chloride flush  5-40 mL IntraVENous 2 times per day    insulin lispro  0-8 Units SubCUTAneous 4x Daily AC & HS    aspirin  81 mg Oral Daily    atorvastatin  40 mg Oral Nightly    guaiFENesin  600 mg Oral BID     Continuous Infusions:   sodium chloride      dextrose      heparin (PORCINE) Infusion 13 Units/kg/hr (01/31/25 0230)     CBC:   Recent Labs     01/29/25  0840 01/30/25  1304   WBC 11.4* 10.2   HGB 12.5 11.6*    334     BMP:    Recent Labs     01/29/25  0840 01/30/25  1304 01/31/25  1012   * 132* 136   K 4.2 3.9 3.4*   CL 98 95* 96*   CO2 16* 23 24   BUN 41* 32* 50*   CREATININE 3.5* 3.1* 3.7*   GLUCOSE 177* 169* 162*     Hepatic:   Recent Labs     01/30/25  1304   AST 22   ALT 7*   BILITOT 0.6   ALKPHOS 95       Troponin:   No results for input(s): \"TROPHS\" in the last 72 hours.    BNP: No results for input(s): \"BNP\" in the last 72 hours.  Lipids:   No results for input(s): \"CHOL\", \"HDL\" in the last 72 hours.    Invalid input(s): \"LDLCALCU\"    INR:   No results for input(s): \"INR\" in the last 72 hours.      EKG:   Normal sinus rhythm, no acute ST-T wave changes      ECHO: 1/24/2025    Left Ventricle: Severely reduced left ventricular

## 2025-01-31 NOTE — PROGRESS NOTES
Dialysis Time Out  To be done by RN and tech or 2 RNs  Staff Names guillaume dasilva rn    [x]  Identity of the patient using 2 patient identifiers  [x]  Consent for treatment  [x]  Equipment-proper machine and dialyzer  [x]  B-Hep B status(hep b ag neg(01/25/2025))  [x]  Orders- to include bath, blood flow, dialyzer, time and fluid removal  [x]  Access-Correct site and in working order  [x]  Time for patient to ask questions.

## 2025-01-31 NOTE — CARE COORDINATION
We had a long discussion with patient's son.  We explained there may only be 1 bypass vessel and with her current health status and may not be advised to pursue bypass surgery.  At this time we would recommend medical management further evaluation with cardiology.  Son appreciates the detailed evaluation of the patient for open heart surgery. .  We witnessed patient's walking which was noted to be poor this morning she relies on a cane to walk and has a very unsteady gait. Medical management this time there is no plan for open heart surgery and we agree to continue with medical management at this time.  Cardiothoracic surgery will sign off

## 2025-01-31 NOTE — PROGRESS NOTES
Occupational Therapy    [facility]  Occupational Therapy Not Seen Note    DATE: 2025    NAME: Elham Amado  MRN: [id number]   : 1945      Patient not seen this date for Occupational Therapy due to:    Hemodialysis:    Next Scheduled Treatment: 25    Electronically signed by MYA Vora on 2025 at 11:42 AM

## 2025-01-31 NOTE — PROGRESS NOTES
[facility] Neurology   IN-PATIENT SERVICE   [facility]    Progress note             Date:   1/31/2025  Patient name:  Elham Amado  Date of admission:  1/24/2025  7:47 PM  MRN:   [id number]  Account:  [id number]  YOB: 1945  PCP:    Thee Kay MD  Room:   2027/2027-01  Code Status:    DNR-CCA    Chief Complaint:   Presents 1/24/25 via EMS to [place] [facility] for SOB and Wheezing transferred to [facility] for SHANITA and Acute CHF. Neuro consult 1/29/25 for CABG clearance   Interval hx:   The Patient was seen and examined at bedside  Is vitally stable alert oriented x3  No acute events overnight  Patient resting comfortably and denies any acute issues. Son also at bedside and both updated currently wating for MRI brain and MRA head WO to better triage true surgical risk from neurology POV.     MRI brain WO 1/30/25-still not yet completed as of 5PM 1/31/25     MRA head WO 1/30/25-Still not yet completed as 5PM 1/31/25  Discussed with bedside nursing importance and plan for and arranging transport for evening   Brief History of Present Illness:   The patient is a 80 y.o.  Non- / non  female who presents 1/24/25 via EMS to [place] [facility] for SOB and Wheezing transferred to [facility] for SHANITA and Acute CHF. 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 Patient gone for Hemodialysis on my initial attempt to see her around noon today however her son was at bedside and provided patients primary history. Patient lives with her son in her own home however does rely on him for daily assistance. Per son patient had right Basal ganglia cva with residual chronic left sided hemiplegia (Left arm 2/5 at baseline) and left leg 3/5 able to walk without walker short distances. Appears patient has new diagnosis of Renal failure requiring Hemodialysis however is still making urine.      Neurology    PT/OT  DVT ppx- is on heparin for NSTEMI and acute renal failure             The plan was discussed with the patient, patient's family and the medical staff.   Consultations:   IP CONSULT TO HEART FAILURE NURSE/COORDINATOR  IP CONSULT TO DIETITIAN  IP CONSULT TO NEPHROLOGY  IP CONSULT TO CARDIOLOGY  IP CONSULT TO VASCULAR ACCESS TEAM  IP CONSULT TO NEUROLOGY    Patient is admitted as inpatient status because of co-morbidities listed above, severity of signs and symptoms as outlined, requirement for current medical therapies and most importantly because of direct risk to patient if care not provided in a hospital setting.    Kumar Montez MD  1/31/2025  12:05 PM    Copy sent to Dr. Kay, Thee RIZO MD

## 2025-01-31 NOTE — PROGRESS NOTES
Comprehensive Nutrition Assessment    Type and Reason for Visit:  Initial, LOS    Nutrition Recommendations/Plan:   Continue current diet.  Continue diabetic ONS TID - likes strawberry  Will monitor labs, weights, intake, GI status, and plan of care.     Malnutrition Assessment:  Malnutrition Status:  Insufficient data (01/31/25 1138)    Context:  Acute Illness     Findings of the 6 clinical characteristics of malnutrition:  Energy Intake:  Mild decrease in energy intake  Weight Loss:  No weight loss     Body Fat Loss:  Unable to assess     Muscle Mass Loss:  Unable to assess    Fluid Accumulation:  Mild Extremities   Strength:  Not Performed    Nutrition Assessment:    Pt admit with CHF. Seen for LOS. PMH: DM, HTN, HLD, CVA, CKD stage 4. Per chart review intakes average 25-50%. Pt out of room at dialysis at visit spoke with son who is caretaker. Son states pts intakes have been lower than baseline. Pt was having trouble with solid foods due to not having dentures, diet was changed to soft yesterday. Son states ate home pt eats breakfast, a small or snack for lunch, then dinner. Son helps to manage pts diabetic diet, DM and monitors blood sugars. Pt receiving ONS, son states pt preferres to have strawberry flavor. Pt EMR mild wt loss x 4 months, son states current wt is around normal body wt.    Nutrition Related Findings:    Edema: +1 BLE. LBM 1/29. Labs: K 3.4, Cl 96, glu 162. Meds: lantus, humalog. Wound Type: None       Current Nutrition Intake & Therapies:    Average Meal Intake: 26-50%  Average Supplements Intake: 51-75%  ADULT ORAL NUTRITION SUPPLEMENT; Breakfast, Lunch, Dinner; Diabetic Oral Supplement  ADULT DIET; Dysphagia - Soft and Bite Sized; 4 carb choices (60 gm/meal)    Anthropometric Measures:  Height: 147.3 cm (4' 9.99\")  Ideal Body Weight (IBW): 90 lbs (41 kg)       Current Body Weight: 56.1 kg (123 lb 10.9 oz), 137.4 % IBW.    Current BMI (kg/m2): 25.9  Usual Body Weight: 58.5 kg (129 lb)

## 2025-01-31 NOTE — PLAN OF CARE
Problem: Respiratory - Adult  Goal: Achieves optimal ventilation and oxygenation  1/31/2025 1046 by Meli Gale, SALVADOR  Outcome: Progressing   BRONCHOSPASM/BRONCHOCONSTRICTION     [x]         IMPROVE AERATION/BREATH SOUNDS  [x]   ADMINISTER BRONCHODILATOR THERAPY AS APPROPRIATE  [x]   ASSESS BREATH SOUNDS  [x]   IMPLEMENT AEROSOL/MDI PROTOCOL  [x]   PATIENT EDUCATION AS NEEDED

## 2025-01-31 NOTE — PLAN OF CARE
Problem: Chronic Conditions and Co-morbidities  Goal: Patient's chronic conditions and co-morbidity symptoms are monitored and maintained or improved  Outcome: Progressing  Flowsheets (Taken 1/30/2025 2000)  Care Plan - Patient's Chronic Conditions and Co-Morbidity Symptoms are Monitored and Maintained or Improved:   Monitor and assess patient's chronic conditions and comorbid symptoms for stability, deterioration, or improvement   Collaborate with multidisciplinary team to address chronic and comorbid conditions and prevent exacerbation or deterioration   Update acute care plan with appropriate goals if chronic or comorbid symptoms are exacerbated and prevent overall improvement and discharge     Problem: Discharge Planning  Goal: Discharge to home or other facility with appropriate resources  Outcome: Progressing  Flowsheets (Taken 1/30/2025 2000)  Discharge to home or other facility with appropriate resources:   Identify barriers to discharge with patient and caregiver   Arrange for needed discharge resources and transportation as appropriate   Identify discharge learning needs (meds, wound care, etc)   Arrange for interpreters to assist at discharge as needed   Refer to discharge planning if patient needs post-hospital services based on physician order or complex needs related to functional status, cognitive ability or social support system     Problem: Respiratory - Adult  Goal: Achieves optimal ventilation and oxygenation  1/31/2025 0019 by Zuri Dickson, RN  Outcome: Progressing  1/30/2025 2026 by Grace Barrett RCP  Outcome: Progressing  Flowsheets  Taken 1/30/2025 2000 by Zuri Dickson, RN  Achieves optimal ventilation and oxygenation:   Assess for changes in respiratory status   Assess for changes in mentation and behavior   Position to facilitate oxygenation and minimize respiratory effort   Oxygen supplementation based on oxygen saturation or arterial blood gases   Encourage

## 2025-01-31 NOTE — PROGRESS NOTES
[facility]  Office: [phone]  Julian Baneags DO, Ronald Drake DO, Олег Weeks DO, José Sanchez DO, Demarco Delgado MD, Chaya Cyr MD, Tommy Madrigal MD, Ness Hernadez MD,  Waldo Jessica MD, Jose G Rice MD, Johanna Laura MD,  Minal Guerra DO, Mady Hurt MD, Fam Jiang MD, Amor Banegas DO, Mayi Moise MD,  Isaac Dickson DO, Iqra Hampton MD, Marianna Bradford MD, Munira Anderson MD, Steph Abernathy MD,  Alexis Powell MD, Heidi Boyd MD, Natan Alexis MD, Ishaan Gant MD, Rod Plasencia MD, Beverly Cooney MD, Hany Yañez DO, Dakota Johnson MD, Minal Boyle MD, Mohsin Reza, MD, Shirley Waterhouse, CNP,  Amanda Molina, CNP, Hany Mcallister, CNP,  Dina Rader, DNP, Angelique Caruso, CNP, Sharon Chavarria, CNP, Geneva Vargas, CNP, Diane Clay, CNP, Natasha Vazquez, PA-C, Yu Mejia, PA-C, Reva Aceves, CNP, Diego Kaplan, CNP,  Pili Ramos, CNP, Valarie Aguilar, CNP, Samantha Castillo, CNP,  Nikki Causey, CNS, Maye Jackman, CNP, Gladys Whitlock, CNP,   Wendy Rubio, CNP         [facility]   IN-PATIENT SERVICE   [facility]    Progress Note    1/31/2025    4:46 PM    Name:   Elham Amado  MRN:     [id number]     Acct:      [id number]   Room:   2027/2027-01  IP Day:  7  Admit Date:  1/24/2025  7:47 PM    PCP:   Thee Kay MD  Code Status:  DNR-CCA    Subjective:     C/C: No chief complaint on file.    Interval History Status: not changed.     No changes overall, no new or worsening symptoms. No plans for CT surgery. Medical management will continue. Tolerating HD with new tunnel cath.   DC planning.     Brief History:     80-year-old female past medical history of diabetes type 2 insulin-dependent, hyperlipidemia, hypertension, history of CVA presents from outlying facility with shortness of breath. Concern for pneumonia and has been treated with antibiotics outpatient patient transferred to [facility] due to

## 2025-01-31 NOTE — PLAN OF CARE
Problem: Respiratory - Adult  Goal: Achieves optimal ventilation and oxygenation  Outcome: Progressing  BRONCHOSPASM/BRONCHOCONSTRICTION     [x]         IMPROVE AERATION/BREATH SOUNDS  [x]   ADMINISTER BRONCHODILATOR THERAPY AS APPROPRIATE  [x]   ASSESS BREATH SOUNDS  []   IMPLEMENT AEROSOL/MDI PROTOCOL  [x]   PATIENT EDUCATION AS NEEDED

## 2025-01-31 NOTE — PROGRESS NOTES
Dialysis Post Treatment Note  Vitals:    01/31/25 1259   BP: (!) 179/94   Pulse: 64   Resp: 20   Temp: 98.8 °F (37.1 °C)   SpO2: 98%     Pre-Weight = 56.1kg  Post-weight = Weight - Scale: 54.6 kg (120 lb 5.9 oz)  Total Liters Processed = Blood Volume Processed (Liters): 71.13 L  Rinseback Volume (mL) = Rinseback Volume (ml): 300 ml  Net Removal (mL) =  1000  Type of access used=temp cath  Length of treatment=3.5hr  No issues patient tolerated treatment good

## 2025-02-01 ENCOUNTER — APPOINTMENT (OUTPATIENT)
Dept: MRI IMAGING | Age: 80
DRG: 280 | End: 2025-02-01
Attending: STUDENT IN AN ORGANIZED HEALTH CARE EDUCATION/TRAINING PROGRAM
Payer: MEDICARE

## 2025-02-01 PROBLEM — I63.532 ACUTE CEREBROVASCULAR ACCIDENT (CVA) DUE TO OCCLUSION OF LEFT POSTERIOR CEREBRAL ARTERY (HCC): Status: ACTIVE | Noted: 2025-02-01

## 2025-02-01 PROBLEM — I63.59 ISCHEMIC CEREBROVASCULAR ACCIDENT (CVA) DUE TO ATHEROSCLEROSIS OF LARGE INTRACRANIAL ARTERY (HCC): Status: ACTIVE | Noted: 2025-02-01

## 2025-02-01 PROBLEM — I63.89 ACUTE UNILATERAL CEREBRAL INFARCTION IN A WATERSHED DISTRIBUTION (HCC): Status: ACTIVE | Noted: 2025-02-01

## 2025-02-01 PROBLEM — I63.232 ACUTE ISCHEMIC LEFT INTERNAL CAROTID ARTERY (ICA) STROKE (HCC): Status: ACTIVE | Noted: 2025-02-01

## 2025-02-01 LAB
ANTI-XA UNFRAC HEPARIN: 0.88 IU/L
GLUCOSE BLD-MCNC: 120 MG/DL (ref 65–105)
GLUCOSE BLD-MCNC: 167 MG/DL (ref 65–105)
GLUCOSE BLD-MCNC: 223 MG/DL (ref 65–105)
GLUCOSE BLD-MCNC: 96 MG/DL (ref 65–105)

## 2025-02-01 PROCEDURE — 99233 SBSQ HOSP IP/OBS HIGH 50: CPT | Performed by: STUDENT IN AN ORGANIZED HEALTH CARE EDUCATION/TRAINING PROGRAM

## 2025-02-01 PROCEDURE — 2580000003 HC RX 258: Performed by: STUDENT IN AN ORGANIZED HEALTH CARE EDUCATION/TRAINING PROGRAM

## 2025-02-01 PROCEDURE — 85520 HEPARIN ASSAY: CPT

## 2025-02-01 PROCEDURE — 6370000000 HC RX 637 (ALT 250 FOR IP): Performed by: PHYSICIAN ASSISTANT

## 2025-02-01 PROCEDURE — 6370000000 HC RX 637 (ALT 250 FOR IP): Performed by: STUDENT IN AN ORGANIZED HEALTH CARE EDUCATION/TRAINING PROGRAM

## 2025-02-01 PROCEDURE — 99233 SBSQ HOSP IP/OBS HIGH 50: CPT | Performed by: SURGERY

## 2025-02-01 PROCEDURE — 6360000002 HC RX W HCPCS: Performed by: PHYSICIAN ASSISTANT

## 2025-02-01 PROCEDURE — 36415 COLL VENOUS BLD VENIPUNCTURE: CPT

## 2025-02-01 PROCEDURE — 2060000000 HC ICU INTERMEDIATE R&B

## 2025-02-01 PROCEDURE — 70551 MRI BRAIN STEM W/O DYE: CPT

## 2025-02-01 PROCEDURE — 99232 SBSQ HOSP IP/OBS MODERATE 35: CPT | Performed by: INTERNAL MEDICINE

## 2025-02-01 PROCEDURE — 99223 1ST HOSP IP/OBS HIGH 75: CPT | Performed by: PSYCHIATRY & NEUROLOGY

## 2025-02-01 PROCEDURE — 97530 THERAPEUTIC ACTIVITIES: CPT

## 2025-02-01 PROCEDURE — 97535 SELF CARE MNGMENT TRAINING: CPT

## 2025-02-01 PROCEDURE — 2500000003 HC RX 250 WO HCPCS: Performed by: NURSE PRACTITIONER

## 2025-02-01 PROCEDURE — 92523 SPEECH SOUND LANG COMPREHEN: CPT

## 2025-02-01 PROCEDURE — 94640 AIRWAY INHALATION TREATMENT: CPT

## 2025-02-01 PROCEDURE — 82947 ASSAY GLUCOSE BLOOD QUANT: CPT

## 2025-02-01 RX ORDER — ROSUVASTATIN CALCIUM 20 MG/1
40 TABLET, COATED ORAL NIGHTLY
Status: DISCONTINUED | OUTPATIENT
Start: 2025-02-01 | End: 2025-02-05 | Stop reason: HOSPADM

## 2025-02-01 RX ORDER — LABETALOL HYDROCHLORIDE 5 MG/ML
10 INJECTION, SOLUTION INTRAVENOUS EVERY 4 HOURS PRN
Status: DISCONTINUED | OUTPATIENT
Start: 2025-02-01 | End: 2025-02-05 | Stop reason: HOSPADM

## 2025-02-01 RX ORDER — CLOPIDOGREL BISULFATE 75 MG/1
75 TABLET ORAL DAILY
Status: DISCONTINUED | OUTPATIENT
Start: 2025-02-02 | End: 2025-02-05 | Stop reason: HOSPADM

## 2025-02-01 RX ORDER — CLOPIDOGREL BISULFATE 75 MG/1
300 TABLET ORAL ONCE
Status: COMPLETED | OUTPATIENT
Start: 2025-02-01 | End: 2025-02-01

## 2025-02-01 RX ORDER — 0.9 % SODIUM CHLORIDE 0.9 %
500 INTRAVENOUS SOLUTION INTRAVENOUS ONCE
Status: COMPLETED | OUTPATIENT
Start: 2025-02-01 | End: 2025-02-01

## 2025-02-01 RX ADMIN — LEVALBUTEROL HYDROCHLORIDE 0.63 MG: 0.63 SOLUTION RESPIRATORY (INHALATION) at 22:52

## 2025-02-01 RX ADMIN — CLOPIDOGREL BISULFATE 300 MG: 75 TABLET ORAL at 16:17

## 2025-02-01 RX ADMIN — SODIUM CHLORIDE, PRESERVATIVE FREE 10 ML: 5 INJECTION INTRAVENOUS at 20:43

## 2025-02-01 RX ADMIN — ROSUVASTATIN CALCIUM 40 MG: 20 TABLET, FILM COATED ORAL at 20:43

## 2025-02-01 RX ADMIN — ASPIRIN 81 MG 81 MG: 81 TABLET ORAL at 08:11

## 2025-02-01 RX ADMIN — SODIUM CHLORIDE 500 ML: 9 INJECTION, SOLUTION INTRAVENOUS at 00:19

## 2025-02-01 RX ADMIN — IPRATROPIUM BROMIDE 0.5 MG: 0.5 SOLUTION RESPIRATORY (INHALATION) at 22:53

## 2025-02-01 RX ADMIN — SODIUM CHLORIDE, PRESERVATIVE FREE 10 ML: 5 INJECTION INTRAVENOUS at 08:12

## 2025-02-01 RX ADMIN — GUAIFENESIN 600 MG: 600 TABLET, EXTENDED RELEASE ORAL at 08:11

## 2025-02-01 RX ADMIN — INSULIN LISPRO 2 UNITS: 100 INJECTION, SOLUTION INTRAVENOUS; SUBCUTANEOUS at 12:26

## 2025-02-01 RX ADMIN — INSULIN GLARGINE 10 UNITS: 100 INJECTION, SOLUTION SUBCUTANEOUS at 21:05

## 2025-02-01 RX ADMIN — HEPARIN SODIUM 13 UNITS/KG/HR: 10000 INJECTION, SOLUTION INTRAVENOUS at 12:26

## 2025-02-01 RX ADMIN — INSULIN GLARGINE 10 UNITS: 100 INJECTION, SOLUTION SUBCUTANEOUS at 08:11

## 2025-02-01 RX ADMIN — GUAIFENESIN 600 MG: 600 TABLET, EXTENDED RELEASE ORAL at 20:43

## 2025-02-01 RX ADMIN — HEPARIN SODIUM 11 UNITS/KG/HR: 10000 INJECTION, SOLUTION INTRAVENOUS at 19:23

## 2025-02-01 NOTE — PROGRESS NOTES
MRI brain WO 1/31/25 @ 21:23  Acute to subacute Left Cerebellar, Left PCA and Left MCA and even right frontal showering embolic appearing ischemic events.       MRA head WO 1/31/25--> concern for Left Clinoid ICA cutoff and occlusion with little to no distal perfusion of left MCA and HALEY branches. Diffuse ICAD including right M1, Left A2 among other areas.       Plan  -NEV consult from 2/1/25 very early AM unless any acute change in patients neurologic exam than will call NEV fellow and emergent consult. I have discussed patients case and plan for repeat neuro exam by neurology resident at this time  -500CC NaCl bolus over 120 minutes (ESRD however given concern for ICAD with embolic appearing strokes likely benefit of augmented SBP).   -Permissive hypertension hold home BP meds -200

## 2025-02-01 NOTE — PROGRESS NOTES
Renal Progress Note    Patient :  Elham Amado; 80 y.o. MRN# [id number]  Location:  2027/2027-01  Attending:  Lisa Correa MD  Admit Date:  1/24/2025   Hospital Day: 8    Subjective:     Patient was seen and examined.  Patient was lying flat.  Her son was present in the room.  She underwent dialysis yesterday and tolerated well.  MRI of brain shows acute or subacute left cerebral stroke in the base distribution of left PCA and left MCA.  Patient voiced no complaint.  Waiting for decision from cardiothoracic surgery regarding surgery versus medical management.        Renal workup reviewed.  Renal ultrasound demonstrates right kidney measuring 8.9 cm, left kidney measuring 8.3 cm, increased cortical echogenicity bilaterally, small cyst noted along the upper pole of the right kidney, no hydronephrosis or intrarenal stones.     Urine chemistry reviewed, urine chloride 84, urine sodium 79, UPC 3.59.    Serologies reviewed, SPEP pending, free light chain ratio 0.47, complements within normal limits, hepatitis panel nonreactive.    Brief History reviewed.  80-year-old female with past medical history of CKD stage IV, uncontrolled type 2 diabetes, hypertension, hyperlipidemia, and CVA who presented to the hospital with chief complaint of shortness of breath, initially found to have elevated BNP of 16,316 and bedside echo demonstrated EF of 25%.  Initial lab work also demonstrated significantly elevated creatinine of 3.4 with GFR of 13.  Patient has history of noncompliance and was last seen by our service in August 2023 due to SHANITA.  At that time her baseline creatinine was deemed to be around 1.8.  Nephrology was consulted for SHANITA on CKD stage IV.      Current Medications:     Scheduled Meds:    rosuvastatin  40 mg Oral Nightly    [Held by provider] amLODIPine  5 mg Oral BID    [Held by provider] carvedilol  12.5 mg Oral BID WC    insulin glargine  10 Units SubCUTAneous BID    ipratropium  0.5 mg Nebulization BID

## 2025-02-01 NOTE — PROGRESS NOTES
Occupational Therapy  Occupational Therapy Daily Treatment Note  Facility/Department: [facility] CAR 2- STEPDOWN   Patient Name: Elham Amado        MRN: [id number]    : 1945    Date of Service: 2025    No chief complaint on file.    Past Medical History:  has a past medical history of Anxiety, Cervicalgia, Diabetes mellitus (HCC), Dorsalgia, Hemiplegia (HCC), Hypertension, Mixed hyperlipidemia, Osteoporosis, and Stroke (cerebrum) ([facility]).  Past Surgical History:  has a past surgical history that includes back surgery; knee surgery; IR TUNNELED CVC PLACE WO SQ PORT/PUMP > 5 YEARS (2025); Cardiac procedure (N/A, 2025); and invasive vascular (Bilateral, 2025).    Discharge Recommendations  Discharge Recommendations: Continue to assess pending progress, Patient would benefit from continued therapy after discharge       Assessment  Performance deficits / Impairments: Decreased functional mobility ;Decreased ADL status;Decreased ROM;Decreased strength;Decreased safe awareness;Decreased cognition;Decreased endurance;Decreased balance;Decreased coordination;Decreased posture;Decreased high-level IADLs;Decreased fine motor control  Prognosis: Good  REQUIRES OT FOLLOW-UP: Yes  Activity Tolerance  Activity Tolerance: Patient Tolerated treatment well;Treatment limited secondary to decreased cognition;Patient limited by fatigue  Safety Devices  Type of Devices: Gait belt;Nurse notified;Call light within reach;Chair alarm in place;Left in chair;Patient at risk for falls  Restraints  Restraints Initially in Place: No    AM-PAC  AM-PAC Daily Activity - Inpatient   How much help is needed for putting on and taking off regular lower body clothing?: A Lot  How much help is needed for bathing (which includes washing, rinsing, drying)?: A Lot  How much help is needed for toileting (which includes using toilet, bedpan, or urinal)?: A Lot  How much help is needed for putting on and taking off regular upper body

## 2025-02-01 NOTE — PROGRESS NOTES
[facility]  Office: [phone]  Julian Banegas DO, Ronald Drake DO, Олег Weeks DO, José Sanchez DO, Demarco Delgado MD, Chaay Cyr MD, Tommy Madrigal MD, Ness Hernadez MD,  Waldo Jessica MD, Jose G Rice MD, Johanna Laura MD,  Minal Guerra DO, Mady Hurt MD, Fam Jiang MD, Amor Banegas DO, Mayi Miose MD,  Isaac Dickson DO, Iqra Hampton MD, Marianna Bradford MD, Munira Anderson MD, Steph Abernathy MD,  Alexis Powell MD, Heidi Boyd MD, Natan Alexis MD, Ishaan Gant MD, Rod Plasencia MD, Beverly Cooney MD, Hany Yañez DO, Dakota Johnson MD, Minal Boyle MD, Mohsin Reza, MD, Shirley Waterhouse, CNP,  Amanda Molina, CNP, Hany Mcallister, CNP,  Dina Rader, DNP, Angelique Caruso, CNP, Sharon Chavarria, CNP, Geneva Vargas, CNP, Diane Clay, CNP, Natasha Vazquez, PA-C, Yu Mejia, PA-C, Reva Aceves, CNP, Diego Kaplan, CNP,  Pili Ramos, CNP, Valarie Aguilar, CNP, Samantha Castillo, CNP,  Nikki Causey, CNS, Maye Jackman, CNP, Gladys Whitlock, CNP,   Wendy Rubio, CNP         [facility]   IN-PATIENT SERVICE   [facility]    Progress Note    2/1/2025    2:41 PM    Name:   Elham Amado  MRN:     [id number]     Acct:      [id number]   Room:   2027/2027-01  IP Day:  8  Admit Date:  1/24/2025  7:47 PM    PCP:   Thee Kay MD  Code Status:  DNR-CCA    Subjective:     C/C: No chief complaint on file.    Interval History Status: not changed.   Waxing and waning. Mri concerning, will need to update son on expediting intervension .   Son feels she's off but not uncommon for her to act like this. Still today is worse then most days.       Mri concerning. Repeat pending.; loaded with plavic.  . Minor patchy subacute infarct at the left parietal lobe with predominantly   confluent infarct at the left PCA distribution.   2. No mass effect or midline shift.   3. Marked diffuse cerebral atrophy and moderate chronic white  Units SubCUTAneous BID    ipratropium  0.5 mg Nebulization BID    levalbuterol  0.63 mg Nebulization BID RT    sodium chloride flush  5-40 mL IntraVENous 2 times per day    insulin lispro  0-8 Units SubCUTAneous 4x Daily AC & HS    aspirin  81 mg Oral Daily    guaiFENesin  600 mg Oral BID     Continuous Infusions:    sodium chloride      dextrose      heparin (PORCINE) Infusion 13 Units/kg/hr (25 1226)     PRN Meds: labetalol, ALPRAZolam, heparin (porcine), heparin (porcine), melatonin, sodium chloride flush, sodium chloride, ondansetron **OR** ondansetron, polyethylene glycol, acetaminophen **OR** acetaminophen, glucose, dextrose bolus **OR** dextrose bolus, glucagon (rDNA), dextrose, heparin (porcine), heparin (porcine), benzonatate    Data:     Past Medical History:   has a past medical history of Anxiety, Cervicalgia, Diabetes mellitus (HCC), Dorsalgia, Hemiplegia (HCC), Hypertension, Mixed hyperlipidemia, Osteoporosis, and Stroke (cerebrum) ([facility]).    Social History:   reports that she has never smoked. She has never been exposed to tobacco smoke. She has never used smokeless tobacco. She reports that she does not drink alcohol and does not use drugs.     Family History:   Family History   Problem Relation Age of Onset    Stroke Other     Diabetes Other     Elevated Lipids Other        Vitals:  BP (!) 151/62   Pulse 69   Temp 98.1 °F (36.7 °C) (Oral)   Resp 18   Ht 1.473 m (4' 9.99\")   Wt 57.5 kg (126 lb 12.2 oz)   SpO2 98%   BMI 26.50 kg/m²   Temp (24hrs), Av.9 °F (36.6 °C), Min:97.7 °F (36.5 °C), Max:98.1 °F (36.7 °C)    Recent Labs     25  1647 25  2014 25  0728 25  1056   POCGLU 290* 247* 96 223*       I/O (24Hr):    Intake/Output Summary (Last 24 hours) at 2025 1441  Last data filed at 2025 1800  Gross per 24 hour   Intake 1126.44 ml   Output --   Net 1126.44 ml       Labs:  Hematology:  Recent Labs     25  1304   WBC 10.2   RBC 3.75*   HGB 11.6*   HCT

## 2025-02-01 NOTE — CONSULTS
Endovascular Neurosurgery Consult    Pt Name: Elham Amado  MRN: [id number]  YOB: 1945  Date of evaluation: 2/1/2025  Primary Care Physician: Thee Kay MD  Patient evaluated at the request of  Dr. Montez  Reason for evaluation: ICAD and concern for new left clinoid ICA occlusion     SUBJECTIVE:   History of Chief Complaint:    The patient is an 80-year-old non-, non- female who arrived at [facility] via EMS on January 24, 2025, due to shortness of breath and wheezing. She was later transferred to [place] for further evaluation of acute kidney injury and acute congestive heart failure. Her past medical history includes hypertension, hyperlipidemia, type 2 diabetes mellitus, a previous right basal ganglia stroke in 1995 with residual left-sided hemiplegia, osteoarthritis, cervicalgia, and anxiety.The patient lives in her own home but relies on her son for daily assistance. She has chronic left-sided hemiplegia from her previous stroke, with baseline strength of 2/5 in her left arm and 3/5 in her left leg. Despite this, she can walk short distances without a walker. She has recently been diagnosed with renal failure requiring hemodialysis  Neurology was consulted for clearance for CABG, bilateral carotid ultrasound that showed   Mild and calcific plaque in the right internal carotid artery without evidence of hemodynamically significant stenosis.    Mild and calcific plaque in the left internal carotid artery without evidence of hemodynamically significant stenosis.    Normal antegrade flow involving the right vertebral artery.    Normal antegrade flow involving the left vertebral artery.    MRI showed subacute infarct at the left parietal lobe, with old left occipital infarct, and several bilateral old basal ganglia lacunar infarct extended to bilateral corona radiator, with acute large left cerebellar infarct  MRA  ANTERIOR CIRCULATION: Extensive multifocal stenoses

## 2025-02-01 NOTE — PLAN OF CARE
Problem: Chronic Conditions and Co-morbidities  Goal: Patient's chronic conditions and co-morbidity symptoms are monitored and maintained or improved  2/1/2025 0056 by Zuri Dickson RN  Outcome: Progressing  1/31/2025 1951 by Milligan, Matthew, RN  Outcome: Progressing  Flowsheets (Taken 1/31/2025 1951)  Care Plan - Patient's Chronic Conditions and Co-Morbidity Symptoms are Monitored and Maintained or Improved:   Monitor and assess patient's chronic conditions and comorbid symptoms for stability, deterioration, or improvement   Collaborate with multidisciplinary team to address chronic and comorbid conditions and prevent exacerbation or deterioration   Update acute care plan with appropriate goals if chronic or comorbid symptoms are exacerbated and prevent overall improvement and discharge     Problem: Discharge Planning  Goal: Discharge to home or other facility with appropriate resources  2/1/2025 0056 by Zuri Dickson RN  Outcome: Progressing  1/31/2025 1951 by Milligan, Matthew, RN  Outcome: Progressing  Flowsheets (Taken 1/31/2025 1951)  Discharge to home or other facility with appropriate resources: Identify barriers to discharge with patient and caregiver     Problem: Respiratory - Adult  Goal: Achieves optimal ventilation and oxygenation  2/1/2025 0056 by Zuri Dickson RN  Outcome: Progressing  1/31/2025 1951 by Milligan, Matthew, RN  Outcome: Progressing  Flowsheets (Taken 1/31/2025 1951)  Achieves optimal ventilation and oxygenation: Assess for changes in respiratory status     Problem: Safety - Adult  Goal: Free from fall injury  2/1/2025 0056 by Zuri Dickson RN  Outcome: Progressing  1/31/2025 1951 by Milligan, Matthew, RN  Outcome: Progressing  Flowsheets (Taken 1/31/2025 1951)  Free From Fall Injury: Instruct family/caregiver on patient safety     Problem: ABCDS Injury Assessment  Goal: Absence of physical injury  2/1/2025 0056 by Zuri Dickson RN  Outcome:

## 2025-02-01 NOTE — PLAN OF CARE
Problem: Chronic Conditions and Co-morbidities  Goal: Patient's chronic conditions and co-morbidity symptoms are monitored and maintained or improved  Outcome: Progressing  Flowsheets (Taken 1/31/2025 1951)  Care Plan - Patient's Chronic Conditions and Co-Morbidity Symptoms are Monitored and Maintained or Improved:   Monitor and assess patient's chronic conditions and comorbid symptoms for stability, deterioration, or improvement   Collaborate with multidisciplinary team to address chronic and comorbid conditions and prevent exacerbation or deterioration   Update acute care plan with appropriate goals if chronic or comorbid symptoms are exacerbated and prevent overall improvement and discharge     Problem: Discharge Planning  Goal: Discharge to home or other facility with appropriate resources  Outcome: Progressing  Flowsheets (Taken 1/31/2025 1951)  Discharge to home or other facility with appropriate resources: Identify barriers to discharge with patient and caregiver     Problem: Respiratory - Adult  Goal: Achieves optimal ventilation and oxygenation  1/31/2025 1951 by Milligan, Matthew, RN  Outcome: Progressing  Flowsheets (Taken 1/31/2025 1951)  Achieves optimal ventilation and oxygenation: Assess for changes in respiratory status  1/31/2025 1046 by Meli Gale RCP  Outcome: Progressing     Problem: Safety - Adult  Goal: Free from fall injury  Outcome: Progressing  Flowsheets (Taken 1/31/2025 1951)  Free From Fall Injury: Instruct family/caregiver on patient safety     Problem: ABCDS Injury Assessment  Goal: Absence of physical injury  Outcome: Progressing  Flowsheets (Taken 1/31/2025 1951)  Absence of Physical Injury: Implement safety measures based on patient assessment     Problem: Skin/Tissue Integrity  Goal: Skin integrity remains intact  Description: 1.  Monitor for areas of redness and/or skin breakdown  2.  Assess vascular access sites hourly  3.  Every 4-6 hours minimum:  Change oxygen

## 2025-02-01 NOTE — PROGRESS NOTES
Physical Therapy        Physical Therapy Cancel Note      DATE: 2025    NAME: Elham Amado  MRN: [id number]   : 1945      Patient not seen this date for Physical Therapy due to:    Patient Declined: Pt declined session stating \"no\" and shaking her head \"no\" , son present and encouraged pt to participate but continued to declined. Pt states she \"might\" do it tomorrow. Will check back next day for participation.       Electronically signed by Rudy Altamirano PTA on 2025 at 3:36 PM

## 2025-02-01 NOTE — PROGRESS NOTES
[facility]   IN-PATIENT SERVICE   [facility]    Progress note             Date:   2/1/2025  Patient name:  Elham Amado  Date of admission:  1/24/2025  7:47 PM  MRN:   [id number]  Account:  [id number]  YOB: 1945  PCP:    Thee Kay MD  Room:   2027/2027-01  Code Status:    DNR-CCA    Chief Complaint:   Presents 1/24/25 via EMS to [facility] for SOB and Wheezing transferred to [facility] for SHANITA and Acute CHF. Neuro consult 1/29/25 for CABG clearance   Interval hx:   The Patient was seen and examined at bedside with on present  SBP last 24 hours 109-151, HR 68-75, tmax 98.1  Patient went for MRI brain WO and MRA head WO late last evening which demonstrated acute left PCA/MCA watershed ischemic stroke and left Cerebellar ischemic stroke. MRA head WO concerning for advanced multifocal ICAD with multiple severe stenosis however most critical does appear to be distal left ICA with near no flow into left MCA branches. Imaging reviewed with son and patient and discussed patients overall waxing and wanning neurologic exam over the last 3 days. It was previously thought this fluctuation was related to her metabolic encephalopathy and being Hemodialysis dependent however with new imaging is more concerning for pressure dependent left ICA with true aphasia. She also newly today has severe speech dysarthria that was not present last evening when I examined her around 5PM.     Of note patients SBP for the first time this admission did drop from 150's average to 109/46 at 4:00PM until 23:20 was consistently <120 further more concerning for rapid progression of her symptoms just prior to the MRI brain. Given her worsening exam this afternoon will repeat MRI brain limited stat, loading plavix 300mg once and continue short term tripple therapy with asa 81, plavix 75mg and heparin drip pending MRI brain May require emergent DSA with NEV and likely Carotid stenting. 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] 01/30/2025    Dependence on renal dialysis (HCC) [Z99.2] 01/29/2025    Infarction of right basal ganglia ([facility]) [I63.9] 01/29/2025    Persistent proteinuria [R80.1] 01/25/2025    Congestive heart failure with unknown left ventricular ejection fraction (HCC) [I50.9] 01/24/2025    Mixed hyperlipidemia [E78.2] 09/09/2023    Hemiparesis affecting left side as late effect of cerebrovascular accident (CVA) ([facility]) [I69.354] 08/09/2023    Hypertension, essential [I10] 08/07/2023    SHANITA (acute kidney injury) ([facility]) [N17.9] 08/06/2023    Acute renal failure with tubular necrosis ([facility]) [N17.0] 08/06/2023    Hyperglycemia due to diabetes mellitus ([facility]) [E11.65] 08/06/2023    Stage 4 chronic kidney disease (HCC) [N18.4] 08/06/2023       Plan:   Acute to subacute left Cerebellar and left MCA/PCA territory watershed infarcts   -MRI brain WO 1/31/25 reviewed in detail above  -MRA head WO 1/31/25- diffuse severe ICAD with multiple areas of critical stenosis including distal left ICA and bilateral PCA Left > right   -NEV consulted overnight and agree with maximal medical therapy continue heparin drip and aspirin 81mg daily  -Load plavix 300mg once and continue short term triple therapy with ASA 81, plavix 75 and heparin  -stat Limited MRI brain WO repeat and pending if further progression of left MCA/PCA stroke than will plan for emergent DSA and intervention of her critical left ICA stenosis/occlusion   -stroke order set focused placed 2/1/25    Hyperlipidemia  -Lipid panel

## 2025-02-01 NOTE — PROGRESS NOTES
[facility]   Progress Note                   Date:   2/1/2025  Patient name: Elham Amado  Date of admission:  1/24/2025  7:47 PM  MRN:   [id number]  YOB: 1945  PCP: Thee Kay MD    Reason for Admission: Congestive heart failure with unknown left ventricular ejection fraction (HCC) [I50.9]    Subjective:       Clinical Changes / Abnormalities: Pt seen and examined in the room  with son Pt denies any CP or sob today.  Labs, vitals and tele reviewed.     Medications:   Scheduled Meds:   rosuvastatin  40 mg Oral Nightly    clopidogrel  300 mg Oral Once    [START ON 2/2/2025] clopidogrel  75 mg Oral Daily    [Held by provider] amLODIPine  5 mg Oral BID    [Held by provider] carvedilol  12.5 mg Oral BID WC    insulin glargine  10 Units SubCUTAneous BID    ipratropium  0.5 mg Nebulization BID    levalbuterol  0.63 mg Nebulization BID RT    sodium chloride flush  5-40 mL IntraVENous 2 times per day    insulin lispro  0-8 Units SubCUTAneous 4x Daily AC & HS    aspirin  81 mg Oral Daily    guaiFENesin  600 mg Oral BID     Continuous Infusions:   sodium chloride      dextrose      heparin (PORCINE) Infusion 13 Units/kg/hr (02/01/25 1226)     CBC:   Recent Labs     01/30/25  1304   WBC 10.2   HGB 11.6*        BMP:    Recent Labs     01/30/25  1304 01/31/25  1012 01/31/25  1829   * 136  --    K 3.9 3.4* 4.0   CL 95* 96*  --    CO2 23 24  --    BUN 32* 50*  --    CREATININE 3.1* 3.7*  --    GLUCOSE 169* 162*  --      Hepatic:   Recent Labs     01/30/25  1304   AST 22   ALT 7*   BILITOT 0.6   ALKPHOS 95       Troponin:   No results for input(s): \"TROPHS\" in the last 72 hours.    BNP: No results for input(s): \"BNP\" in the last 72 hours.  Lipids:   No results for input(s): \"CHOL\", \"HDL\" in the last 72 hours.    Invalid input(s): \"LDLCALCU\"    INR:   No results for input(s): \"INR\" in the last 72 hours.      EKG:   Normal sinus rhythm, no acute ST-T wave changes      ECHO:

## 2025-02-01 NOTE — PROGRESS NOTES
Facility/Department: [facility] CAR 2- STEPDOWN  Initial Speech/Language/Cognitive Assessment    NAME: Elham Amado  : 1945   MRN: [id number]  ADMISSION DATE: 2025  ADMITTING DIAGNOSIS: has SHANITA (acute kidney injury) (HCC); Hyperglycemia due to diabetes mellitus (HCC); Acute renal failure with tubular necrosis (HCC); Stage 4 chronic kidney disease (HCC); Hypertension, essential; Urinary retention with incomplete bladder emptying; Hemiparesis affecting left side as late effect of cerebrovascular accident (CVA) (HCC); Mixed hyperlipidemia; Cervicalgia; Congestive heart failure with unknown left ventricular ejection fraction (HCC); Persistent proteinuria; CAD, multiple vessel; Dependence on renal dialysis (HCC); Infarction of right basal ganglia (HCC); Status post multiple cerebral infarctions; Severe vascular dementia (HCC); Acute ischemic left internal carotid artery (ICA) stroke (HCC); Acute cerebrovascular accident (CVA) due to occlusion of left posterior cerebral artery (HCC); Ischemic cerebrovascular accident (CVA) due to atherosclerosis of large intracranial artery (HCC); and Acute unilateral cerebral infarction in a watershed distribution (HCC) on their problem list.  DATE ONSET: 25    Date of Eval: 2025   Evaluating Therapist: Natasha Pedro    RECENT RESULTS  CT OF HEAD/MRI:   MRI 25  IMPRESSION:  1. Minor patchy subacute infarct at the left parietal lobe with predominantly  confluent infarct at the left PCA distribution.  2. No mass effect or midline shift.  3. Marked diffuse cerebral atrophy and moderate chronic white matter ischemic  change.  4. Old left occipital infarct. Several bilateral old basal ganglia lacunar  infarcts with bilateral extension into the corona radiata.    Primary Complaint:   Obtained from H&P:  Elham Amado is a 80 y.o. Non- / non  female who presents with shortness of breath and is admitted to the hospital for the management of Congestive heart

## 2025-02-02 PROBLEM — I50.9 CONGESTIVE HEART FAILURE WITH UNKNOWN LEFT VENTRICULAR EJECTION FRACTION (HCC): Status: ACTIVE | Noted: 2025-02-02

## 2025-02-02 LAB
ALBUMIN SERPL-MCNC: 3.8 G/DL (ref 3.5–5.2)
ALBUMIN/GLOB SERPL: 1.3 {RATIO} (ref 1–2.5)
ALP SERPL-CCNC: 103 U/L (ref 35–104)
ALT SERPL-CCNC: <5 U/L (ref 10–35)
ANION GAP SERPL CALCULATED.3IONS-SCNC: 15 MMOL/L (ref 9–16)
ANION GAP SERPL CALCULATED.3IONS-SCNC: 16 MMOL/L (ref 9–16)
ANTI-XA UNFRAC HEPARIN: 0.28 IU/L
ANTI-XA UNFRAC HEPARIN: 0.75 IU/L
ANTI-XA UNFRAC HEPARIN: 1.3 IU/L
AST SERPL-CCNC: 29 U/L (ref 10–35)
BASOPHILS # BLD: 0 K/UL (ref 0–0.2)
BASOPHILS NFR BLD: 0 % (ref 0–2)
BILIRUB SERPL-MCNC: 0.5 MG/DL (ref 0–1.2)
BUN SERPL-MCNC: 43 MG/DL (ref 8–23)
BUN SERPL-MCNC: 53 MG/DL (ref 8–23)
CALCIUM SERPL-MCNC: 9 MG/DL (ref 8.6–10.4)
CALCIUM SERPL-MCNC: 9.4 MG/DL (ref 8.6–10.4)
CHLORIDE SERPL-SCNC: 100 MMOL/L (ref 98–107)
CHLORIDE SERPL-SCNC: 100 MMOL/L (ref 98–107)
CK SERPL-CCNC: 102 U/L (ref 26–192)
CO2 SERPL-SCNC: 19 MMOL/L (ref 20–31)
CO2 SERPL-SCNC: 20 MMOL/L (ref 20–31)
CREAT SERPL-MCNC: 3.6 MG/DL (ref 0.6–0.9)
CREAT SERPL-MCNC: 4 MG/DL (ref 0.6–0.9)
EOSINOPHIL # BLD: 0.59 K/UL (ref 0–0.4)
EOSINOPHILS RELATIVE PERCENT: 4 % (ref 1–4)
ERYTHROCYTE [DISTWIDTH] IN BLOOD BY AUTOMATED COUNT: 12.5 % (ref 11.8–14.4)
GFR, ESTIMATED: 11 ML/MIN/1.73M2
GFR, ESTIMATED: 12 ML/MIN/1.73M2
GLUCOSE BLD-MCNC: 169 MG/DL (ref 65–105)
GLUCOSE BLD-MCNC: 180 MG/DL (ref 65–105)
GLUCOSE BLD-MCNC: 192 MG/DL (ref 65–105)
GLUCOSE BLD-MCNC: 90 MG/DL (ref 65–105)
GLUCOSE SERPL-MCNC: 195 MG/DL (ref 74–99)
GLUCOSE SERPL-MCNC: 91 MG/DL (ref 74–99)
HCT VFR BLD AUTO: 36.5 % (ref 36.3–47.1)
HGB BLD-MCNC: 12.4 G/DL (ref 11.9–15.1)
IMM GRANULOCYTES # BLD AUTO: 0 K/UL (ref 0–0.3)
IMM GRANULOCYTES NFR BLD: 0 %
LYMPHOCYTES NFR BLD: 3.82 K/UL (ref 1–4.8)
LYMPHOCYTES RELATIVE PERCENT: 26 % (ref 24–44)
MAGNESIUM SERPL-MCNC: 2.4 MG/DL (ref 1.6–2.4)
MCH RBC QN AUTO: 31.8 PG (ref 25.2–33.5)
MCHC RBC AUTO-ENTMCNC: 34 G/DL (ref 28.4–34.8)
MCV RBC AUTO: 93.6 FL (ref 82.6–102.9)
MONOCYTES NFR BLD: 1.18 K/UL (ref 0.1–0.8)
MONOCYTES NFR BLD: 8 % (ref 1–7)
MORPHOLOGY: ABNORMAL
MORPHOLOGY: ABNORMAL
NEUTROPHILS NFR BLD: 62 % (ref 36–66)
NEUTS SEG NFR BLD: 9.11 K/UL (ref 1.8–7.7)
NRBC BLD-RTO: 0 PER 100 WBC
PLATELET # BLD AUTO: 345 K/UL (ref 138–453)
PMV BLD AUTO: 10.2 FL (ref 8.1–13.5)
POTASSIUM SERPL-SCNC: 3.3 MMOL/L (ref 3.7–5.3)
POTASSIUM SERPL-SCNC: 3.9 MMOL/L (ref 3.7–5.3)
PROT SERPL-MCNC: 6.8 G/DL (ref 6.6–8.7)
RBC # BLD AUTO: 3.9 M/UL (ref 3.95–5.11)
SODIUM SERPL-SCNC: 135 MMOL/L (ref 136–145)
SODIUM SERPL-SCNC: 135 MMOL/L (ref 136–145)
WBC OTHER # BLD: 14.7 K/UL (ref 3.5–11.3)

## 2025-02-02 PROCEDURE — 80053 COMPREHEN METABOLIC PANEL: CPT

## 2025-02-02 PROCEDURE — 2500000003 HC RX 250 WO HCPCS: Performed by: NURSE PRACTITIONER

## 2025-02-02 PROCEDURE — 94640 AIRWAY INHALATION TREATMENT: CPT

## 2025-02-02 PROCEDURE — 6370000000 HC RX 637 (ALT 250 FOR IP): Performed by: NURSE PRACTITIONER

## 2025-02-02 PROCEDURE — 36415 COLL VENOUS BLD VENIPUNCTURE: CPT

## 2025-02-02 PROCEDURE — 99232 SBSQ HOSP IP/OBS MODERATE 35: CPT | Performed by: STUDENT IN AN ORGANIZED HEALTH CARE EDUCATION/TRAINING PROGRAM

## 2025-02-02 PROCEDURE — 97530 THERAPEUTIC ACTIVITIES: CPT

## 2025-02-02 PROCEDURE — 83735 ASSAY OF MAGNESIUM: CPT

## 2025-02-02 PROCEDURE — 82947 ASSAY GLUCOSE BLOOD QUANT: CPT

## 2025-02-02 PROCEDURE — 6370000000 HC RX 637 (ALT 250 FOR IP): Performed by: INTERNAL MEDICINE

## 2025-02-02 PROCEDURE — 82550 ASSAY OF CK (CPK): CPT

## 2025-02-02 PROCEDURE — 80048 BASIC METABOLIC PNL TOTAL CA: CPT

## 2025-02-02 PROCEDURE — 85520 HEPARIN ASSAY: CPT

## 2025-02-02 PROCEDURE — 99233 SBSQ HOSP IP/OBS HIGH 50: CPT | Performed by: PSYCHIATRY & NEUROLOGY

## 2025-02-02 PROCEDURE — 6370000000 HC RX 637 (ALT 250 FOR IP): Performed by: PHYSICIAN ASSISTANT

## 2025-02-02 PROCEDURE — 6370000000 HC RX 637 (ALT 250 FOR IP): Performed by: STUDENT IN AN ORGANIZED HEALTH CARE EDUCATION/TRAINING PROGRAM

## 2025-02-02 PROCEDURE — 99232 SBSQ HOSP IP/OBS MODERATE 35: CPT | Performed by: INTERNAL MEDICINE

## 2025-02-02 PROCEDURE — 6360000002 HC RX W HCPCS: Performed by: PHYSICIAN ASSISTANT

## 2025-02-02 PROCEDURE — 2060000000 HC ICU INTERMEDIATE R&B

## 2025-02-02 PROCEDURE — 97116 GAIT TRAINING THERAPY: CPT

## 2025-02-02 PROCEDURE — 99233 SBSQ HOSP IP/OBS HIGH 50: CPT | Performed by: SURGERY

## 2025-02-02 PROCEDURE — 85025 COMPLETE CBC W/AUTO DIFF WBC: CPT

## 2025-02-02 RX ORDER — SODIUM BICARBONATE 650 MG/1
650 TABLET ORAL 4 TIMES DAILY
Status: DISCONTINUED | OUTPATIENT
Start: 2025-02-02 | End: 2025-02-05 | Stop reason: HOSPADM

## 2025-02-02 RX ORDER — POTASSIUM CHLORIDE 1500 MG/1
40 TABLET, EXTENDED RELEASE ORAL ONCE
Status: COMPLETED | OUTPATIENT
Start: 2025-02-02 | End: 2025-02-02

## 2025-02-02 RX ADMIN — GUAIFENESIN 600 MG: 600 TABLET, EXTENDED RELEASE ORAL at 20:35

## 2025-02-02 RX ADMIN — HEPARIN SODIUM 7 UNITS/KG/HR: 10000 INJECTION, SOLUTION INTRAVENOUS at 12:23

## 2025-02-02 RX ADMIN — INSULIN GLARGINE 10 UNITS: 100 INJECTION, SOLUTION SUBCUTANEOUS at 20:39

## 2025-02-02 RX ADMIN — SODIUM BICARBONATE 650 MG: 650 TABLET ORAL at 20:34

## 2025-02-02 RX ADMIN — IPRATROPIUM BROMIDE 0.5 MG: 0.5 SOLUTION RESPIRATORY (INHALATION) at 19:55

## 2025-02-02 RX ADMIN — SODIUM BICARBONATE 650 MG: 650 TABLET ORAL at 12:53

## 2025-02-02 RX ADMIN — INSULIN GLARGINE 10 UNITS: 100 INJECTION, SOLUTION SUBCUTANEOUS at 08:59

## 2025-02-02 RX ADMIN — INSULIN LISPRO 2 UNITS: 100 INJECTION, SOLUTION INTRAVENOUS; SUBCUTANEOUS at 12:52

## 2025-02-02 RX ADMIN — POTASSIUM CHLORIDE 40 MEQ: 1500 TABLET, EXTENDED RELEASE ORAL at 03:05

## 2025-02-02 RX ADMIN — CLOPIDOGREL BISULFATE 75 MG: 75 TABLET ORAL at 08:59

## 2025-02-02 RX ADMIN — ASPIRIN 81 MG 81 MG: 81 TABLET ORAL at 08:59

## 2025-02-02 RX ADMIN — SODIUM BICARBONATE 650 MG: 650 TABLET ORAL at 17:19

## 2025-02-02 RX ADMIN — GUAIFENESIN 600 MG: 600 TABLET, EXTENDED RELEASE ORAL at 08:59

## 2025-02-02 RX ADMIN — ROSUVASTATIN CALCIUM 40 MG: 20 TABLET, FILM COATED ORAL at 20:35

## 2025-02-02 RX ADMIN — HEPARIN SODIUM 1500 UNITS: 1000 INJECTION INTRAVENOUS; SUBCUTANEOUS at 19:33

## 2025-02-02 RX ADMIN — SODIUM CHLORIDE, PRESERVATIVE FREE 10 ML: 5 INJECTION INTRAVENOUS at 09:37

## 2025-02-02 NOTE — PROGRESS NOTES
Renal Progress Note    Patient :  Elham Amado; 80 y.o. MRN# [id number]  Location:  2027/2027-01  Attending:  Lisa Correa MD  Admit Date:  1/24/2025   Hospital Day: 9    Subjective:     Due to patient having waxing and waning mentation, MRI of brain yesterday showed acute or subacute left cerebral stroke in the base distribution of left PCA and left MCA.  Neurology is following and she was loaded with Plavix, to continue heparin drip with repeat MRI.  Patient lying flat in bed in no acute distress.  Oriented to self.  Blood pressures running 150 systolic to 170 systolic  Labs today:Na 135, K 3.3, Cl 100, CO2 19, BUN 43, Cr 3.6  Still making some urine.    Brief History reviewed.  80-year-old female with past medical history of CKD stage IV, uncontrolled type 2 diabetes, hypertension, hyperlipidemia, and CVA who presented to the hospital with chief complaint of shortness of breath, initially found to have elevated BNP of 16,316 and bedside echo demonstrated EF of 25%.  She underwent cardiac catheterization on January 29 which showed severe multivessel coronary artery disease.  CT surgery to discuss management at conference meeting on Monday.  Initial lab work also demonstrated significantly elevated creatinine of 3.4 with GFR of 13.  Patient has history of noncompliance and was last seen by our service in August 2023 due to SHANITA.  At that time her baseline creatinine was deemed to be around 1.8.  Nephrology was consulted for SHANITA on CKD stage IV.  Patient was initiated on dialysis this admission.  Her last session was on January 31.    Renal workup reviewed.  Renal ultrasound demonstrates right kidney measuring 8.9 cm, left kidney measuring 8.3 cm, increased cortical echogenicity bilaterally, small cyst noted along the upper pole of the right kidney, no hydronephrosis or intrarenal stones.     Urine chemistry reviewed, urine chloride 84, urine sodium 79, UPC 3.59.    Serologies reviewed, SPEP pending, free light

## 2025-02-02 NOTE — PROGRESS NOTES
[facility]   Progress Note                   Date:   2/2/2025  Patient name: Elham Amado  Date of admission:  1/24/2025  7:47 PM  MRN:   [id number]  YOB: 1945  PCP: Thee Kay MD    Reason for Admission: Congestive heart failure with unknown left ventricular ejection fraction (HCC) [I50.9]    Subjective:       Clinical Changes / Abnormalities: Pt seen and examined in the room  with son Pt denies any CP or sob today.  Labs, vitals and tele reviewed.     Medications:   Scheduled Meds:   sodium bicarbonate  650 mg Oral 4x Daily    rosuvastatin  40 mg Oral Nightly    clopidogrel  75 mg Oral Daily    [Held by provider] amLODIPine  5 mg Oral BID    [Held by provider] carvedilol  12.5 mg Oral BID WC    insulin glargine  10 Units SubCUTAneous BID    ipratropium  0.5 mg Nebulization BID    levalbuterol  0.63 mg Nebulization BID RT    sodium chloride flush  5-40 mL IntraVENous 2 times per day    insulin lispro  0-8 Units SubCUTAneous 4x Daily AC & HS    aspirin  81 mg Oral Daily    guaiFENesin  600 mg Oral BID     Continuous Infusions:   sodium chloride      dextrose      heparin (PORCINE) Infusion 7 Units/kg/hr (02/02/25 1223)     CBC:   Recent Labs     02/02/25 0217   WBC 14.7*   HGB 12.4        BMP:    Recent Labs     01/31/25  1012 01/31/25  1829 02/02/25 0217     --  135*   K 3.4* 4.0 3.3*   CL 96*  --  100   CO2 24  --  19*   BUN 50*  --  43*   CREATININE 3.7*  --  3.6*   GLUCOSE 162*  --  91     Hepatic:   Recent Labs     02/02/25 0217   AST 29   ALT <5*   BILITOT 0.5   ALKPHOS 103       Troponin:   No results for input(s): \"TROPHS\" in the last 72 hours.    BNP: No results for input(s): \"BNP\" in the last 72 hours.  Lipids:   No results for input(s): \"CHOL\", \"HDL\" in the last 72 hours.    Invalid input(s): \"LDLCALCU\"    INR:   No results for input(s): \"INR\" in the last 72 hours.      EKG:   Normal sinus rhythm, no acute ST-T wave changes      ECHO: 1/24/2025     Left Ventricle: Severely reduced left ventricular systolic function. EF by 2D Simpsons Biplane is 25%. Left ventricle is smaller than normal. Mildly increased wall thickness. See diagram for wall motion findings.    Aortic Valve: Trileaflet valve. Mildly thickened cusps.    Mitral Valve: Mild annular calcification. Mildly thickened leaflets. Mild regurgitation. Mild stenosis noted. MV mean gradient is 6 mmHg.    Tricuspid Valve: Mild regurgitation. The estimated RVSP is 37 mmHg.    Pericardium: Trivial localized pericardial effusion present around the right ventricle. No indication of cardiac tamponade. Evidence includes no IVC dilation and no chamber collapse.    Image quality is adequate.    CARDIAC CATH 1/28/25    Severe multivessel disease    Severe stenosis of LAD from proximal to distal region    Ostial left circumflex artery has moderate stenosis, distal left circumflex artery has severe stenosis    Severe ostial stenosis of RCA that is moderate vessel, RCA is a nondominant vessel, RCA is diffusely diseased from proximal to distal region.    CT surgery consult, will discuss with CT surgery team    I talked to the patient son in detail regarding the complex coronary disease with age and low EF and the complexity of fixing the lesions, I discussed with the patient likely patient is not a good candidate for CT surgery but we will take their input and recommendations, I explained him in detail the risk and benefits of Impella guided PCI of LAD at least and he recommend medical treatment only as she has lived her life and she does not want to lose her during the procedure.    Continue goal-directed medical therapy, aspirin and high intensity statin  Objective:   Vitals: BP (!) 169/78   Pulse 88   Temp 98.8 °F (37.1 °C) (Oral)   Resp 18   Ht 1.473 m (4' 9.99\")   Wt 58.5 kg (128 lb 15.5 oz)   SpO2 100%   BMI 26.96 kg/m²   General appearance: alert and cooperative with exam  HEENT: Head: Normocephalic, no

## 2025-02-02 NOTE — PROGRESS NOTES
Writer messaged NP Shirley Waterhouse on call \"Pt's dialysis tunneled cath dressing has blood underneath and is leaking out. I am going to change dressing just wanted to make you aware. Pt also passed speech eval and I plan on advancing diet as the order states as long as you are okay with that. She also has Q8 and ACHS orders, can you adjust those orders so that there is only one, please? thank you\"   NP responded \"OK sounds good to me\"

## 2025-02-02 NOTE — PROGRESS NOTES
Endovascular Neurosurgery Progress Note    Pt Name: Elham Amado  MRN: [id number]  YOB: 1945  Date of evaluation: 2/2/2025  Primary Care Physician: Thee Kay MD  Patient evaluated at the request of  Dr. Montez  Reason for evaluation: ICAD and concern for new left clinoid ICA occlusion     SUBJECTIVE:     Had an episode of worsening exam transiently yesterday. MRI brain repeated by Neurology team showing unchanged infarct with no new changes. SBP augmented and started on plavix in addition to aspirin and heparin gtt. Today exam remains stable.        History of Chief Complaint:    The patient is an 80-year-old non-, non- female who arrived at [facility] via EMS on January 24, 2025, due to shortness of breath and wheezing. She was later transferred to [place] for further evaluation of acute kidney injury and acute congestive heart failure. Her past medical history includes hypertension, hyperlipidemia, type 2 diabetes mellitus, a previous right basal ganglia stroke in 1995 with residual left-sided hemiplegia, osteoarthritis, cervicalgia, and anxiety.The patient lives in her own home but relies on her son for daily assistance. She has chronic left-sided hemiplegia from her previous stroke, with baseline strength of 2/5 in her left arm and 3/5 in her left leg. Despite this, she can walk short distances without a walker. She has recently been diagnosed with renal failure requiring hemodialysis  Neurology was consulted for clearance for CABG, bilateral carotid ultrasound that showed   Mild and calcific plaque in the right internal carotid artery without evidence of hemodynamically significant stenosis.    Mild and calcific plaque in the left internal carotid artery without evidence of hemodynamically significant stenosis.    Normal antegrade flow involving the right vertebral artery.    Normal antegrade flow involving the left vertebral artery.    MRI showed subacute infarct at  History   has a past surgical history that includes back surgery; knee surgery; IR TUNNELED CVC PLACE WO SQ PORT/PUMP > 5 YEARS (1/27/2025); Cardiac procedure (N/A, 1/28/2025); and invasive vascular (Bilateral, 1/28/2025).  Social History   reports that she has never smoked. She has never been exposed to tobacco smoke. She has never used smokeless tobacco.   reports no history of alcohol use.   reports no history of drug use.  Family History  family history includes Diabetes in an other family member; Elevated Lipids in an other family member; Stroke in an other family member.    Review of Systems:  CONSTITUTIONAL:  negative for fevers, chills, fatigue and malaise    EYES:  negative for double vision, blurred vision and photophobia     HEENT:  negative for tinnitus, epistaxis and sore throat    RESPIRATORY:  negative for cough, shortness of breath, wheezing    CARDIOVASCULAR:  negative for chest pain, palpitations, syncope, edema    GASTROINTESTINAL:  negative for nausea, vomiting    GENITOURINARY:  negative for incontinence    MUSCULOSKELETAL:  negative for neck or back pain    NEUROLOGICAL:  Positive for weakness   PSYCHIATRIC:  negative for anxiety      Review of systems otherwise negative.      OBJECTIVE:   Vitals: BP (!) 169/78   Pulse 88   Temp 98.8 °F (37.1 °C) (Oral)   Resp 18   Ht 1.473 m (4' 9.99\")   Wt 58.5 kg (128 lb 15.5 oz)   SpO2 100%   BMI 26.96 kg/m²     Gen: No acute distress  MS: Awake, Oriented x1-2 fluctuating, ooir memory, requires repetition but overall able to follow commands  CN: Pupils reactive, no gaze deviation, no gross facial droop, moderate-severe dysarthria  Motor: RUE 4/5, LUE 4/5, Bilateral lowers 2/5  Sens: Responds to LT in all extremities but slightly decreased on R side  Gait: Deferred, unable to walk       12 points  NIH Stroke Scale  1A: Level of consciousness --> 0 = Alert; keenly responsive  1B: Ask month and age --> 1 = 1 question right  1C: 'Blink eyes' & 'squeeze

## 2025-02-02 NOTE — PROGRESS NOTES
Admission medications    Medication Sig Start Date End Date Taking? Authorizing Provider   amLODIPine (NORVASC) 5 MG tablet Take 1 tablet by mouth daily 11/6/24  Yes Thee Kay MD   Magnesium 400 MG CAPS Take by mouth three times a week   Yes Jose Enrique Garcia MD   Cholecalciferol (VITAMIN D3) 50 MCG (2000 UT) CAPS Take 1 capsule by mouth daily   Yes Jose Enrique Garcia MD   Omega-3 Fatty Acids (FISH OIL) 1000 MG capsule Take 1 capsule by mouth three times a week   Yes Jose Enrique Garcia MD   zinc gluconate 50 MG tablet Take 1 tablet by mouth daily   Yes Jose Enrique Garcia MD   Multiple Vitamin (MULTI VITAMIN DAILY PO) Take by mouth   Yes Jose Enrique Garcia MD   traMADol (ULTRAM) 50 MG tablet Take 1 tablet by mouth every 6 hours as needed for Pain for up to 90 days. Max Daily Amount: 200 mg 1/13/25 4/13/25  Thee Kay MD   LANTUS SOLOSTAR 100 UNIT/ML injection pen INJECT 40 UNITS AT BEDTIME 12/16/24   Thee Kay MD   Insulin Pen Needle (PEN NEEDLES) 32G X 4 MM MISC 1 Needle by Does not apply route daily 8/5/24   Thee Kay MD   Continuous Glucose Sensor (FREESTYLE TERRY 2 SENSOR) MISC 2 Units by Does not apply route every 14 days 8/5/24   Thee Kay MD   HUMALOG KWIKPEN 100 UNIT/ML SOPN Inject 10 Units into the skin 3 times daily (before meals) 7/10/24   Rudy Davila MD   CRANBERRY PO Take by mouth three times a week    Jose Enrique Garcia MD   blood glucose test strips (ASCENSIA AUTODISC VI;ONE TOUCH ULTRA TEST VI) strip 1 each by In Vitro route in the morning, at noon, in the evening, and at bedtime 2-4 times daily PRN    Jose Enrique Garcia MD   Lancets 33G MISC 1 Lancet  by Does not apply route in the morning, at noon, in the evening, and at bedtime 2-4 times daily PRN    Jose Enrique Garcia MD   Ascorbic Acid (VITAMIN C) 1000 MG tablet Take 1 tablet by mouth daily    Jose Enrique Garcia MD        Allergies:     Aricept [donepezil hcl] and  WO 8/6/23 supporting Left PCA embolic event and residual encephalomalacia, bilateral BG and right frontal encephalomalacia consistent with multi infarct status.  -bilateral carotid ultrasound 1/28/25-no hemodynamically significant stenosis noted.   -Bilateral Carotid ultrasound 11/14/2017-left CCA 65% stenosis with bilateral proximal ICA atherosclerotic changes without significant stenosis  -FU MRI brain WO and MRA head WO to better evaluate intracranial disease  -continue secondary stroke prevention with aspirin 81mg and lipitor 40mg   -heparin for NSTEMI will defer to primary team and cardiology  -ANCA 1/25/25 negative along with GUERDA reflex also negative      Primary hypertension   -Holding norvasc 5 and Coreg 12.5mg BID since 1/31/25 after MRI results  -SBP goal Permissive hypertension 140-200        DM2  -HBA1C 1/25/25-9.1 from neurology goal is < 7.0   -management per primary medicine team   -currently on lantus 10 and medium dose sliding scale glucose 177 this AM   -is on lantus 40 nightly at home      PT/OT/ST  PM&R consulted 2/1/25 given new CVA and comes from home likely may need and benefit from ARU  DVT ppx- is on heparin for NSTEMI and acute renal failure             The plan was discussed with the patient, patient's family and the medical staff.   Consultations:   IP CONSULT TO HEART FAILURE NURSE/COORDINATOR  IP CONSULT TO DIETITIAN  IP CONSULT TO NEPHROLOGY  IP CONSULT TO CARDIOLOGY  IP CONSULT TO VASCULAR ACCESS TEAM  IP CONSULT TO NEUROLOGY  IP CONSULT TO ENDOVASCULAR NEUROSURGERY  IP CONSULT TO CASE MANAGEMENT  IP CONSULT TO PHYSICAL MEDICINE REHAB    Patient is admitted as inpatient status because of co-morbidities listed above, severity of signs and symptoms as outlined, requirement for current medical therapies and most importantly because of direct risk to patient if care not provided in a hospital setting.    Kumar Montez MD  2/2/2025  12:05 PM    Copy sent to Thee Chong MD

## 2025-02-02 NOTE — PROGRESS NOTES
Physical Therapy  Facility/Department: [facility] CAR 2- STEPDOWN   Physical Therapy Daily Treatment Note    Patient Name: Elham Amado        MRN: [id number]    : 1945    Date of Service: 2025    No chief complaint on file.    Past Medical History:  has a past medical history of Anxiety, Cervicalgia, Diabetes mellitus (HCC), Dorsalgia, Hemiplegia (HCC), Hypertension, Mixed hyperlipidemia, Osteoporosis, and Stroke (cerebrum) (HCC).  Past Surgical History:  has a past surgical history that includes back surgery; knee surgery; IR TUNNELED CVC PLACE WO SQ PORT/PUMP > 5 YEARS (2025); Cardiac procedure (N/A, 2025); and invasive vascular (Bilateral, 2025).    Discharge Recommendations  Discharge Recommendations: Patient would benefit from continued therapy after discharge  PT Equipment Recommendations  Equipment Needed: No  Other: continue to assess, son states has cane but baseline does not use it    Assessment  Body Structures, Functions, Activity Limitations Requiring Skilled Therapeutic Intervention: Decreased functional mobility ;Decreased strength;Decreased endurance;Decreased balance;Decreased ROM  Assessment: Pt demo mod A for bed mobility and transfers this date. Pt needed mod cues on cane use and overall safety as she at times demo impulsive movements and decreased safety awareness. Pt was able to ambulate 6' from EOB to recliner and then 15'x2 to/from bathroom with SQBC mod A for overall safety and balance. Pt would be unsafe to return to prior living arrangements due to decreased gait and balance, high fall risk, decreased strength and not back to baseline which is IND without AD with support from son. Pt would currently need 24/7 physical assistance for all care. Pt would benefit from further therapy to address functional mobility deficits and establish new baseline and appropriate support from son in order to DC home.  Therapy Prognosis: Good  Activity Tolerance  Activity Tolerance:

## 2025-02-02 NOTE — PROGRESS NOTES
During the assesment of the patient, writer saw that the tunnel cath was bleeding through the dressing. Advised by charge to change to dressing. MD Gonzalez notified.

## 2025-02-02 NOTE — PROGRESS NOTES
[facility]  Office: [phone]  Julian Banegas DO, Ronald Drake DO, Олег Weeks DO, José Sanchez DO, Demarco Delgado MD, Chaya Cyr MD, Tommy Madrigal MD, Ness Hernadez MD,  Waldo Jessica MD, Jose G Rice MD, Johanna Laura MD,  Minal Guerra DO, Mady Hurt MD, Fam Jiang MD, Amor Banegas DO, Mayi Moise MD,  Isaac Dickson DO, Iqra Hampton MD, Marianna Bradford MD, Munira Anderson MD, Steph Abernathy MD,  Alexis Powell MD, Heidi Boyd MD, Natan Alexis MD, Ishaan Gant MD, Rod Plasencia MD, Beverly Cooney MD, Hany Yañez DO, Dakota Johnson MD, Minal Boyle MD, Mohsin Reza, MD, Shirley Waterhouse, CNP,  Amanda Molina, CNP, Hany Mcallister, CNP,  Dina Rader, DNP, Angelique Caruso, CNP, Sharon Chavarria, CNP, Geneva Vargas, CNP, Diane Clay, CNP, Natasha Vazquez, PA-C, Yu Mejia, PA-C, eRva Aceves, CNP, Diego Kaplan, CNP,  Pili Ramos, CNP, Valarie Aguilar, CNP, Samantha Castillo, CNP,  Nikki Causey, CNS, Maye Jackman, CNP, Gladys Whitlock, CNP,   Wendy Rubio, CNP         [facility]   IN-PATIENT SERVICE   [facility]    Progress Note    2/2/2025    9:45 AM    Name:   Elham Amado  MRN:     [id number]     Acct:      [id number]   Room:   2027/2027-01  IP Day:  9  Admit Date:  1/24/2025  7:47 PM    PCP:   Thee Kay MD  Code Status:  DNR-CCA    Subjective:     C/C: No chief complaint on file.    Interval History Status: not changed.     Dressing changing for tunnel cath. On ac. Monitor for resolved bleeding.   Bicarb downtrended, started on replacement  Mentation slighly better, no new findings.     Brief History:   80-year-old female past medical history of diabetes type 2 insulin-dependent, hyperlipidemia, hypertension, history of CVA presents from outlying facility with shortness of breath. Concern for pneumonia and has been treated with antibiotics outpatient patient transferred to [facility] due to     Ischemic cerebrovascular accident (CVA) due to atherosclerosis of large intracranial artery (HCC) 2/1/2025 Yes    Acute unilateral cerebral infarction in a watershed distribution (HCC) 2/1/2025 Yes       Plan:        #subacute cva  -watershed, possible from low BP during HD,  underlying cad but has been on heparin  -waxing and waning, BP labile with HD, reported low BP with HD sessions.   -MRI concerning. Loaded with plavix, neurology and vasular on board, remaints on heparin, dapt, hi statin with plans to repeat mri followed by emergent DSA and carotid stenting.   -neuro checks, follow up on mri, risk factor reduction  -cardio clearance pending for neuro    -on heparin, monitor for hemorrhagic conversion   -permissive hypertension, avoid hypotension  -consider dwain for vegetations-->cardio recs [[]p[   -as per vacular they will Plan for DSA once medically optimized and will consider left ICA stent placement if indicated after DSA       #Multivessel CAD  -s/p pci.  Medicaly managed   -cts evaluated, no plans for surgery, continue medical mgt.   -tolerated aspirin, statin, bb, remains on heparin.   -lifevest on dc  -CTS signed off not candidate for CABG   -dc planning -->transition off heparin [][] if cardio okay           #acute renal failure requiring HD  -on this admission, she had atunneled cath placed for HD, worsening ckd4 from baseline. Underlying dm2, hypertension and cad.   -npehro on board, continue diuresis and volume control         #Hx of CVA with left sided hemiparalysis  -stable at baseline. No new deficits. Occurred in 1990s from uncontrolled hypertension as per report. She's indipendent at home, walks without walker. Left arm effected the most, speech is clear with mild facial droop. Cn2-12 intact. Moves limbs spontaneously. Ao2. Tired keeping eyes closed but responds appropriate.         #Acute on chronic clinical systolic heart failure with worsening renal failure  -presumed to be initial insult

## 2025-02-03 PROBLEM — N18.6 ESRD (END STAGE RENAL DISEASE) (HCC): Status: ACTIVE | Noted: 2025-02-03

## 2025-02-03 PROBLEM — I63.9 CEREBROVASCULAR ACCIDENT (CVA) (HCC): Status: ACTIVE | Noted: 2025-02-03

## 2025-02-03 LAB
ANION GAP SERPL CALCULATED.3IONS-SCNC: 13 MMOL/L (ref 9–16)
ANTI-XA UNFRAC HEPARIN: 0.28 IU/L
ANTI-XA UNFRAC HEPARIN: 0.45 IU/L
ANTI-XA UNFRAC HEPARIN: 1.3 IU/L
BUN SERPL-MCNC: 51 MG/DL (ref 8–23)
CALCIUM SERPL-MCNC: 9.1 MG/DL (ref 8.6–10.4)
CHLORIDE SERPL-SCNC: 103 MMOL/L (ref 98–107)
CO2 SERPL-SCNC: 21 MMOL/L (ref 20–31)
CREAT SERPL-MCNC: 3.8 MG/DL (ref 0.6–0.9)
GFR, ESTIMATED: 11 ML/MIN/1.73M2
GLUCOSE BLD-MCNC: 175 MG/DL (ref 65–105)
GLUCOSE BLD-MCNC: 212 MG/DL (ref 65–105)
GLUCOSE BLD-MCNC: 86 MG/DL (ref 65–105)
GLUCOSE SERPL-MCNC: 77 MG/DL (ref 74–99)
POTASSIUM SERPL-SCNC: 3.7 MMOL/L (ref 3.7–5.3)
SODIUM SERPL-SCNC: 137 MMOL/L (ref 136–145)

## 2025-02-03 PROCEDURE — 6370000000 HC RX 637 (ALT 250 FOR IP): Performed by: STUDENT IN AN ORGANIZED HEALTH CARE EDUCATION/TRAINING PROGRAM

## 2025-02-03 PROCEDURE — 6370000000 HC RX 637 (ALT 250 FOR IP): Performed by: INTERNAL MEDICINE

## 2025-02-03 PROCEDURE — 6360000002 HC RX W HCPCS: Performed by: NURSE PRACTITIONER

## 2025-02-03 PROCEDURE — 6360000002 HC RX W HCPCS: Performed by: PHYSICIAN ASSISTANT

## 2025-02-03 PROCEDURE — 2060000000 HC ICU INTERMEDIATE R&B

## 2025-02-03 PROCEDURE — 6370000000 HC RX 637 (ALT 250 FOR IP): Performed by: PHYSICIAN ASSISTANT

## 2025-02-03 PROCEDURE — 99233 SBSQ HOSP IP/OBS HIGH 50: CPT | Performed by: PSYCHIATRY & NEUROLOGY

## 2025-02-03 PROCEDURE — 80048 BASIC METABOLIC PNL TOTAL CA: CPT

## 2025-02-03 PROCEDURE — APPSS30 APP SPLIT SHARED TIME 16-30 MINUTES: Performed by: NURSE PRACTITIONER

## 2025-02-03 PROCEDURE — 99233 SBSQ HOSP IP/OBS HIGH 50: CPT | Performed by: NURSE PRACTITIONER

## 2025-02-03 PROCEDURE — 82947 ASSAY GLUCOSE BLOOD QUANT: CPT

## 2025-02-03 PROCEDURE — 36415 COLL VENOUS BLD VENIPUNCTURE: CPT

## 2025-02-03 PROCEDURE — 99232 SBSQ HOSP IP/OBS MODERATE 35: CPT | Performed by: PSYCHIATRY & NEUROLOGY

## 2025-02-03 PROCEDURE — 90935 HEMODIALYSIS ONE EVALUATION: CPT | Performed by: INTERNAL MEDICINE

## 2025-02-03 PROCEDURE — 94640 AIRWAY INHALATION TREATMENT: CPT

## 2025-02-03 PROCEDURE — 6360000002 HC RX W HCPCS: Performed by: INTERNAL MEDICINE

## 2025-02-03 PROCEDURE — 90935 HEMODIALYSIS ONE EVALUATION: CPT

## 2025-02-03 PROCEDURE — 85520 HEPARIN ASSAY: CPT

## 2025-02-03 RX ORDER — CARVEDILOL 12.5 MG/1
12.5 TABLET ORAL 2 TIMES DAILY WITH MEALS
Status: DISCONTINUED | OUTPATIENT
Start: 2025-02-03 | End: 2025-02-04

## 2025-02-03 RX ADMIN — ONDANSETRON 4 MG: 2 INJECTION INTRAMUSCULAR; INTRAVENOUS at 12:43

## 2025-02-03 RX ADMIN — GUAIFENESIN 600 MG: 600 TABLET, EXTENDED RELEASE ORAL at 16:05

## 2025-02-03 RX ADMIN — INSULIN GLARGINE 10 UNITS: 100 INJECTION, SOLUTION SUBCUTANEOUS at 08:55

## 2025-02-03 RX ADMIN — INSULIN GLARGINE 10 UNITS: 100 INJECTION, SOLUTION SUBCUTANEOUS at 20:14

## 2025-02-03 RX ADMIN — ROSUVASTATIN CALCIUM 40 MG: 20 TABLET, FILM COATED ORAL at 20:12

## 2025-02-03 RX ADMIN — INSULIN LISPRO 2 UNITS: 100 INJECTION, SOLUTION INTRAVENOUS; SUBCUTANEOUS at 20:14

## 2025-02-03 RX ADMIN — IPRATROPIUM BROMIDE 0.5 MG: 0.5 SOLUTION RESPIRATORY (INHALATION) at 20:45

## 2025-02-03 RX ADMIN — CARVEDILOL 12.5 MG: 12.5 TABLET, FILM COATED ORAL at 16:05

## 2025-02-03 RX ADMIN — SODIUM BICARBONATE 650 MG: 650 TABLET ORAL at 16:05

## 2025-02-03 RX ADMIN — CLOPIDOGREL BISULFATE 75 MG: 75 TABLET ORAL at 16:06

## 2025-02-03 RX ADMIN — HEPARIN SODIUM 1600 UNITS: 1000 INJECTION INTRAVENOUS; SUBCUTANEOUS at 14:12

## 2025-02-03 RX ADMIN — HEPARIN SODIUM 1500 UNITS: 1000 INJECTION INTRAVENOUS; SUBCUTANEOUS at 09:35

## 2025-02-03 RX ADMIN — ASPIRIN 81 MG 81 MG: 81 TABLET ORAL at 16:05

## 2025-02-03 RX ADMIN — SODIUM BICARBONATE 650 MG: 650 TABLET ORAL at 20:14

## 2025-02-03 RX ADMIN — GUAIFENESIN 600 MG: 600 TABLET, EXTENDED RELEASE ORAL at 20:12

## 2025-02-03 ASSESSMENT — PAIN SCALES - GENERAL
PAINLEVEL_OUTOF10: 0
PAINLEVEL_OUTOF10: 0

## 2025-02-03 NOTE — PROGRESS NOTES
[facility]  Office: [phone]  Julian Banegas DO, Ronald Drake DO, Олег Weeks DO, José Sanchez DO, Demarco Delgado MD, Chaya Cyr MD, Tommy Madrigal MD, Ness Hernadez MD,  Waldo Jessica MD, Jose G Rice MD, Johanna Laura MD,  Minal Guerra DO, Mady Hurt MD, Fam Jiang MD, Amor Banegas DO, Mayi Moise MD,  Isaac Dickson DO, Iqra Hampton MD, Marianna Bradford MD, Munira Anderson MD, Steph Abernathy MD,  Alexis Powell MD, Heidi Boyd MD, Natan Alexis MD, Ishaan Gant MD, Rod Plasencia MD, Beverly Cooney MD, Hany Yañez DO, Dakota Johnson MD, Minal Boyle MD, Mohsin Reza, MD, Shirley Waterhouse, CNP,  Amanda Molina, CNP, Hany Mcallister, CNP,  Dina Rader, DNP, Angelique Caruso, CNP, Sharon Chavarria, CNP, Geneva Vargas, CNP, Diane Clay, CNP, Natasha Vazquez, PA-C, Yu Mejia, PA-C, Reva Aceves, CNP, Diego Kaplan, CNP,  Pili Ramos, CNP, Valarie Aguilar, CNP, Samantha Castillo, CNP,  Nikki Causey, CNS, Maye Jackman, CNP, Gladys Whitlock, CNP,   Wendy Rubio, CNP         [facility]   IN-PATIENT SERVICE   [facility]    Progress Note    2/3/2025    9:46 AM    Name:   Elham Amado  MRN:     [id number]     Acct:      [id number]   Room:   2027/2027-01  IP Day:  10  Admit Date:  1/24/2025  7:47 PM    PCP:   Thee Kay MD  Code Status:  DNR-CCA    Subjective:     C/C: No chief complaint on file.    Interval History Status: not changed.     Bp and hr untrending, resume home coreg.   Went to HD today. Her 2 other sons from [place] are here. Family understands she will likley needs rehab.   If not interventions are planned, consider discharging. Denies's any new or worsening symptoms    Cath conference to guide further pci planning.   Brief History:     80-year-old female past medical history of diabetes type 2 insulin-dependent, hyperlipidemia, hypertension, history of CVA presents from outlying facility with

## 2025-02-03 NOTE — PROGRESS NOTES
Physical Therapy        Physical Therapy Cancel Note      DATE: 2/3/2025    NAME: Elham Amado  MRN: [id number]   : 1945      Patient not seen this date for Physical Therapy due to:    Hemodialysis: Pt was attempted t/o the day for session however was at HD. Will check back as able.       Electronically signed by Rudy Altamirano PTA on 2/3/2025 at 3:23 PM

## 2025-02-03 NOTE — PROGRESS NOTES
Occupational Therapy    [facility]  Occupational Therapy Not Seen Note    DATE: 2/3/2025    NAME: Elham Amado  MRN: [id number]   : 1945      Patient not seen this date for Occupational Therapy due to: HD    Next Scheduled Treatment: 25    Electronically signed by HÉCTOR SKINNER on 2/3/2025 at 2:04 PM]

## 2025-02-03 NOTE — PROGRESS NOTES
[facility]   Progress Note                   Date:   2/3/2025  Patient name: Elham Amado  Date of admission:  1/24/2025  7:47 PM  MRN:   [id number]  YOB: 1945  PCP: Thee Kay MD    Reason for Admission: Congestive heart failure with unknown left ventricular ejection fraction (HCC) [I50.9]    Subjective:       Clinical Changes / Abnormalities: Pt seen and examined in the room with 2 sons.  Pt denies any CP or sob today.  Labs, vitals and tele reviewed.     Medications:   Scheduled Meds:   carvedilol  12.5 mg Oral BID WC    sodium bicarbonate  650 mg Oral 4x Daily    rosuvastatin  40 mg Oral Nightly    clopidogrel  75 mg Oral Daily    [Held by provider] amLODIPine  5 mg Oral BID    insulin glargine  10 Units SubCUTAneous BID    ipratropium  0.5 mg Nebulization BID    levalbuterol  0.63 mg Nebulization BID RT    sodium chloride flush  5-40 mL IntraVENous 2 times per day    insulin lispro  0-8 Units SubCUTAneous 4x Daily AC & HS    aspirin  81 mg Oral Daily    guaiFENesin  600 mg Oral BID     Continuous Infusions:   sodium chloride      dextrose      heparin (PORCINE) Infusion 11 Units/kg/hr (02/03/25 0934)     CBC:   Recent Labs     02/02/25 0217   WBC 14.7*   HGB 12.4        BMP:    Recent Labs     02/02/25 0217 02/02/25  1801 02/03/25 0215   * 135* 137   K 3.3* 3.9 3.7    100 103   CO2 19* 20 21   BUN 43* 53* 51*   CREATININE 3.6* 4.0* 3.8*   GLUCOSE 91 195* 77     Hepatic:   Recent Labs     02/02/25 0217   AST 29   ALT <5*   BILITOT 0.5   ALKPHOS 103       Troponin:   No results for input(s): \"TROPHS\" in the last 72 hours.    BNP: No results for input(s): \"BNP\" in the last 72 hours.  Lipids:   No results for input(s): \"CHOL\", \"HDL\" in the last 72 hours.    Invalid input(s): \"LDLCALCU\"    INR:   No results for input(s): \"INR\" in the last 72 hours.      EKG:   Normal sinus rhythm, no acute ST-T wave changes      ECHO: 1/24/2025    Left Ventricle:

## 2025-02-03 NOTE — PROGRESS NOTES
NEUROLOGY INPATIENT PROGRESS NOTE    2/3/2025         Current Exam:     Chart reviewed. Patient seen while she was at HD. She remains mostly non verbal, is able to follow only some simple commands. She is not moving her LUE.         Brief History:    Elham Amado is a  80 y.o. female with H/O HTN, HLD, DM, prior right basal ganglia stroke with residual left-sided hemiplegia, who was admitted from [facility] on 1/24/2025 where she initially presented with shortness of breath and wheezing.  She was transferred to [facility] due to her SHANITA and acute CHF.  Neurology is asked to consult for her CAD for this risk stratification for potential CABG.  She is deemed very high risk given her age, urgent surgery requirement, and history of stroke.  She was not taking any antithrombotics prior to admission.  MRI brain imaging shows acute to subacute left cerebellar and left MCA/PCA territory watershed infarcts.  Neuroendovascular was consulted and patient was placed on aspirin and Plavix daily as well as a heparin drip.  Repeat MRI on 2/1/2025 with stable with no new ischemic strokes.  Neuroendovascular does plan for DSA once she is medically stable.       No current facility-administered medications on file prior to encounter.     Current Outpatient Medications on File Prior to Encounter   Medication Sig Dispense Refill    amLODIPine (NORVASC) 5 MG tablet Take 1 tablet by mouth daily 90 tablet 3    Magnesium 400 MG CAPS Take by mouth three times a week      Cholecalciferol (VITAMIN D3) 50 MCG (2000 UT) CAPS Take 1 capsule by mouth daily      Omega-3 Fatty Acids (FISH OIL) 1000 MG capsule Take 1 capsule by mouth three times a week      zinc gluconate 50 MG tablet Take 1 tablet by mouth daily      Multiple Vitamin (MULTI VITAMIN DAILY PO) Take by mouth      traMADol (ULTRAM) 50 MG tablet Take 1 tablet by mouth every 6 hours as needed for Pain for up to 90 days. Max Daily Amount: 200 mg 120 tablet 2    LANTUS SOLOSTAR 100

## 2025-02-03 NOTE — PROGRESS NOTES
Occupational Therapy    [facility]  Occupational Therapy Not Seen Note    DATE: 2/3/2025    NAME: Elham Amado  MRN: [id number]   : 1945      Patient not seen this date for Occupational Therapy due to: Too fatigued from HD    Next Scheduled Treatment: 25    Electronically signed by HÉCTOR SKINNER on 2/3/2025 at 5:58 PM

## 2025-02-03 NOTE — PROGRESS NOTES
NEPHROLOGY DIALYSIS NOTE    PROCEDURE    Patient seen on Hemodialysis  2/3/2025 at 11:47 AM  Access cannulated without problems      TREATMENT ORDERS   See dialysis flowsheet for specifics on access, blood flow rate, dialysate baths, duration of dialysis, anticoagulation and other technical information.    Dialysis Bath  K+ (Potassium): 3  Ca+ (Calcium): 2.5  Na+ (Sodium): 140  HCO3 (Bicarb): 35       INVESTIGATIONS     Last 3 CMP:    Recent Labs     02/02/25 0217 02/02/25  1801 02/03/25 0215   * 135* 137   K 3.3* 3.9 3.7    100 103   CO2 19* 20 21   BUN 43* 53* 51*   CREATININE 3.6* 4.0* 3.8*   CALCIUM 9.4 9.0 9.1   BILITOT 0.5  --   --    ALKPHOS 103  --   --    AST 29  --   --    ALT <5*  --   --        Last 3 CBC:  Recent Labs     02/02/25 0217   WBC 14.7*   RBC 3.90*   HGB 12.4   HCT 36.5   MCV 93.6   MCH 31.8   MCHC 34.0   RDW 12.5      MPV 10.2         GFR: Estimated Creatinine Clearance: 8 mL/min (A) (based on SCr of 3.8 mg/dL (H)).  Phosphorus:  No results for input(s): \"PHOS\" in the last 72 hours.  Magnesium:   Recent Labs     02/02/25 0217   MG 2.4     Albumin: No results for input(s): \"LABALBU\" in the last 72 hours.  PTH                :No results found for: \"PTH\"           MEDICATIONS     Scheduled Meds:    carvedilol  12.5 mg Oral BID WC    sodium bicarbonate  650 mg Oral 4x Daily    rosuvastatin  40 mg Oral Nightly    clopidogrel  75 mg Oral Daily    [Held by provider] amLODIPine  5 mg Oral BID    insulin glargine  10 Units SubCUTAneous BID    ipratropium  0.5 mg Nebulization BID    levalbuterol  0.63 mg Nebulization BID RT    sodium chloride flush  5-40 mL IntraVENous 2 times per day    insulin lispro  0-8 Units SubCUTAneous 4x Daily AC & HS    aspirin  81 mg Oral Daily    guaiFENesin  600 mg Oral BID     Continuous Infusions:    sodium chloride      dextrose      heparin (PORCINE) Infusion 11 Units/kg/hr (02/03/25 1875)     PRN Meds:  labetalol, ALPRAZolam, heparin (porcine),  heparin (porcine), melatonin, sodium chloride flush, sodium chloride, ondansetron **OR** ondansetron, polyethylene glycol, acetaminophen **OR** acetaminophen, glucose, dextrose bolus **OR** dextrose bolus, glucagon (rDNA), dextrose, heparin (porcine), heparin (porcine), benzonatate  Home Meds:                Medications Prior to Admission: amLODIPine (NORVASC) 5 MG tablet, Take 1 tablet by mouth daily  Magnesium 400 MG CAPS, Take by mouth three times a week  Cholecalciferol (VITAMIN D3) 50 MCG (2000 UT) CAPS, Take 1 capsule by mouth daily  Omega-3 Fatty Acids (FISH OIL) 1000 MG capsule, Take 1 capsule by mouth three times a week  zinc gluconate 50 MG tablet, Take 1 tablet by mouth daily  Multiple Vitamin (MULTI VITAMIN DAILY PO), Take by mouth  traMADol (ULTRAM) 50 MG tablet, Take 1 tablet by mouth every 6 hours as needed for Pain for up to 90 days. Max Daily Amount: 200 mg  LANTUS SOLOSTAR 100 UNIT/ML injection pen, INJECT 40 UNITS AT BEDTIME  Insulin Pen Needle (PEN NEEDLES) 32G X 4 MM MISC, 1 Needle by Does not apply route daily  Continuous Glucose Sensor (FREESTYLE TERRY 2 SENSOR) MISC, 2 Units by Does not apply route every 14 days  HUMALOG KWIKPEN 100 UNIT/ML SOPN, Inject 10 Units into the skin 3 times daily (before meals)  CRANBERRY PO, Take by mouth three times a week  blood glucose test strips (ASCENSIA AUTODISC VI;ONE TOUCH ULTRA TEST VI) strip, 1 each by In Vitro route in the morning, at noon, in the evening, and at bedtime 2-4 times daily PRN  Lancets 33G MISC, 1 Lancet  by Does not apply route in the morning, at noon, in the evening, and at bedtime 2-4 times daily PRN  Ascorbic Acid (VITAMIN C) 1000 MG tablet, Take 1 tablet by mouth daily    EXAMINATION     Vitals BP (!) 147/81   Pulse 98   Temp 98.5 °F (36.9 °C)   Resp 18   Ht 1.473 m (4' 9.99\")   Wt 55.3 kg (121 lb 14.6 oz)   SpO2 100%   BMI 25.49 kg/m²   LE edema  Absent, no icterus,clubbing,JVP not elevated  CVS :S1 S2 normal,no gallop or

## 2025-02-03 NOTE — PLAN OF CARE
Problem: Chronic Conditions and Co-morbidities  Goal: Patient's chronic conditions and co-morbidity symptoms are monitored and maintained or improved  Outcome: Progressing     Problem: Discharge Planning  Goal: Discharge to home or other facility with appropriate resources  Outcome: Progressing     Problem: Respiratory - Adult  Goal: Achieves optimal ventilation and oxygenation  2/3/2025 0744 by Lakisha Swartz RN  Outcome: Progressing  2/2/2025 2228 by Chey Yarbrough RCP  Outcome: Progressing     Problem: Safety - Adult  Goal: Free from fall injury  Outcome: Progressing     Problem: ABCDS Injury Assessment  Goal: Absence of physical injury  Outcome: Progressing     Problem: Skin/Tissue Integrity  Goal: Skin integrity remains intact  Description: 1.  Monitor for areas of redness and/or skin breakdown  2.  Assess vascular access sites hourly  3.  Every 4-6 hours minimum:  Change oxygen saturation probe site  4.  Every 4-6 hours:  If on nasal continuous positive airway pressure, respiratory therapy assess nares and determine need for appliance change or resting period  Outcome: Progressing     Problem: Pain  Goal: Verbalizes/displays adequate comfort level or baseline comfort level  Outcome: Progressing     Problem: Neurosensory - Adult  Goal: Remains free of injury related to seizures activity  Outcome: Progressing     Problem: Skin/Tissue Integrity - Adult  Goal: Skin integrity remains intact  Description: 1.  Monitor for areas of redness and/or skin breakdown  2.  Assess vascular access sites hourly  3.  Every 4-6 hours minimum:  Change oxygen saturation probe site  4.  Every 4-6 hours:  If on nasal continuous positive airway pressure, respiratory therapy assess nares and determine need for appliance change or resting period  Outcome: Progressing  Goal: Incisions, wounds, or drain sites healing without S/S of infection  Outcome: Progressing     Problem: Musculoskeletal - Adult  Goal: Return mobility to safest

## 2025-02-03 NOTE — PATIENT CARE CONFERENCE
[facility]  Heart Team Discussion        Patients current medical condition, laboratory and radiographic findings as well as recommendations of physicians consulted on the case discussed in heart team conference.  Dr Amaya MD, Samson Davis MD, Dr Gael MD, Tho Davidson MD, and Hany Martinez MD in attendance. We will opt for hybrid approach with bypass to OM and PCI to OM as well as LIMA-LAD as per discussion in cath conference.     Fer Barksdale MD  [facility] Fellow

## 2025-02-03 NOTE — PROGRESS NOTES
Endovascular Neurosurgery Progress Note    Pt Name: Elham Amado  MRN: [id number]  YOB: 1945  Date of evaluation: 2/3/2025  Primary Care Physician: Thee Kay MD  Patient evaluated at the request of  Dr. Montez  Reason for evaluation: ICAD and concern for new left clinoid ICA occlusion     SUBJECTIVE:     NAEO from EVN standpoint. Patient remains with unchanged neurological examination.        History of Chief Complaint:    The patient is an 80-year-old non-, non- female who arrived at [facility] via EMS on January 24, 2025, due to shortness of breath and wheezing. She was later transferred to [facility] for further evaluation of acute kidney injury and acute congestive heart failure. Her past medical history includes hypertension, hyperlipidemia, type 2 diabetes mellitus, a previous right basal ganglia stroke in 1995 with residual left-sided hemiplegia, osteoarthritis, cervicalgia, and anxiety.The patient lives in her own home but relies on her son for daily assistance. She has chronic left-sided hemiplegia from her previous stroke, with baseline strength of 2/5 in her left arm and 3/5 in her left leg. Despite this, she can walk short distances without a walker. She has recently been diagnosed with renal failure requiring hemodialysis  Neurology was consulted for clearance for CABG, bilateral carotid ultrasound that showed   Mild and calcific plaque in the right internal carotid artery without evidence of hemodynamically significant stenosis.    Mild and calcific plaque in the left internal carotid artery without evidence of hemodynamically significant stenosis.    Normal antegrade flow involving the right vertebral artery.    Normal antegrade flow involving the left vertebral artery.    MRI showed subacute infarct at the left parietal lobe, with old left occipital infarct, and several bilateral old basal ganglia lacunar infarct extended to bilateral corona radiator, with  (N/A, 1/28/2025); and invasive vascular (Bilateral, 1/28/2025).  Social History   reports that she has never smoked. She has never been exposed to tobacco smoke. She has never used smokeless tobacco.   reports no history of alcohol use.   reports no history of drug use.  Family History  family history includes Diabetes in an other family member; Elevated Lipids in an other family member; Stroke in an other family member.    Review of Systems:  CONSTITUTIONAL:  negative for fevers, chills, fatigue and malaise    EYES:  negative for double vision, blurred vision and photophobia     HEENT:  negative for tinnitus, epistaxis and sore throat    RESPIRATORY:  negative for cough, shortness of breath, wheezing    CARDIOVASCULAR:  negative for chest pain, palpitations, syncope, edema    GASTROINTESTINAL:  negative for nausea, vomiting    GENITOURINARY:  negative for incontinence    MUSCULOSKELETAL:  negative for neck or back pain    NEUROLOGICAL:  Positive for weakness   PSYCHIATRIC:  negative for anxiety      Review of systems otherwise negative.      OBJECTIVE:   Vitals: BP (!) 173/68   Pulse 94   Temp 98.2 °F (36.8 °C) (Oral)   Resp 20   Ht 1.473 m (4' 9.99\")   Wt 60.2 kg (132 lb 11.5 oz)   SpO2 100%   BMI 27.75 kg/m²     Gen: No acute distress  MS: Awake, Oriented x1-2 fluctuating, ooir memory, requires repetition but overall able to follow commands  CN: Pupils reactive, no gaze deviation, no gross facial droop, moderate-severe dysarthria  Motor: RUE 4/5, LUE 4/5, Bilateral lowers 2/5  Sens: Responds to LT in all extremities but slightly decreased on R side  Gait: Deferred, unable to walk       12 points  NIH Stroke Scale  1A: Level of consciousness --> 0 = Alert; keenly responsive  1B: Ask month and age --> 1 = 1 question right  1C: 'Blink eyes' & 'squeeze hands' --> 0 = Performs both tasks  2: Horizontal extraocular movements --> 0 = Normal  3: Visual fields --> 0 = No visual loss  4: Facial palsy --> 0 = Normal

## 2025-02-03 NOTE — PROGRESS NOTES
Dialysis Post Treatment Note  Vitals:    02/03/25 1424   BP: (!) 155/82   Pulse: 87   Resp: 20   Temp: 96.8 °F (36 °C)   SpO2:      Pre-Weight = 55.3 kg  Post-weight = Weight - Scale: 55 kg (121 lb 4.1 oz)  Total Liters Processed = Blood Volume Processed (Liters): 75.27 L  Rinseback Volume (mL) = Rinseback Volume (ml): 300 ml  Net Removal (mL) =  956 mL  Patient's dry weight= TBD  Type of access used= R neck tunneled cath.   Length of treatment= 210 min.     Pt tolerated tx fairly. [place] through tx pt felt sick. 350 mL saline bolus given. VS stable. Report called to [facility]. NATALIE. 0.9 L removed.

## 2025-02-03 NOTE — PROGRESS NOTES
Dialysis Time Out  To be done by RN and tech or 2 RNs  Staff Names: Roula ODONNELL RN and Lakisha WASHBURN RN.     [x]  Identity of the patient using 2 patient identifiers  [x]  Consent for treatment  [x]  Equipment-proper machine and dialyzer  [x]  B-Hep B status: 1/25/25. Non-reactive.   [x]  Orders- to include bath, blood flow, dialyzer, time and fluid removal  [x]  Access-Correct site and in working order  [x]  Time for patient to ask questions.

## 2025-02-03 NOTE — PROGRESS NOTES
[facility]  Speech Language Pathology    Date: 2/3/2025  Patient Name: Elham Amado  YOB: 1945   AGE: 80 y.o.  MRN: [id number]        Patient Not Available for Speech Therapy     Due to:  [] Testing  [x] Hemodialysis  [] Cancelled by RN  [] Surgery   [] Intubation/Sedation/Pain Medication  [] Medical instability  [] Other:    Next scheduled treatment: 02/04/2025  Completed by: Lakisha Abreu  Clinician    Cosigned By: Geneva Cervantes M.A., CCC/SLP

## 2025-02-04 ENCOUNTER — APPOINTMENT (OUTPATIENT)
Dept: CT IMAGING | Age: 80
DRG: 280 | End: 2025-02-04
Attending: STUDENT IN AN ORGANIZED HEALTH CARE EDUCATION/TRAINING PROGRAM
Payer: MEDICARE

## 2025-02-04 LAB
ANTI-XA UNFRAC HEPARIN: 0.48 IU/L
ANTI-XA UNFRAC HEPARIN: 0.63 IU/L
GLUCOSE BLD-MCNC: 163 MG/DL (ref 65–105)
GLUCOSE BLD-MCNC: 186 MG/DL (ref 65–105)
GLUCOSE BLD-MCNC: 188 MG/DL (ref 65–105)
GLUCOSE BLD-MCNC: 73 MG/DL (ref 65–105)

## 2025-02-04 PROCEDURE — 99232 SBSQ HOSP IP/OBS MODERATE 35: CPT | Performed by: STUDENT IN AN ORGANIZED HEALTH CARE EDUCATION/TRAINING PROGRAM

## 2025-02-04 PROCEDURE — 99233 SBSQ HOSP IP/OBS HIGH 50: CPT | Performed by: PSYCHIATRY & NEUROLOGY

## 2025-02-04 PROCEDURE — 99232 SBSQ HOSP IP/OBS MODERATE 35: CPT | Performed by: INTERNAL MEDICINE

## 2025-02-04 PROCEDURE — 70450 CT HEAD/BRAIN W/O DYE: CPT

## 2025-02-04 PROCEDURE — 2060000000 HC ICU INTERMEDIATE R&B

## 2025-02-04 PROCEDURE — 92507 TX SP LANG VOICE COMM INDIV: CPT

## 2025-02-04 PROCEDURE — 97530 THERAPEUTIC ACTIVITIES: CPT

## 2025-02-04 PROCEDURE — 99223 1ST HOSP IP/OBS HIGH 75: CPT | Performed by: PHYSICAL MEDICINE & REHABILITATION

## 2025-02-04 PROCEDURE — 2500000003 HC RX 250 WO HCPCS: Performed by: NURSE PRACTITIONER

## 2025-02-04 PROCEDURE — 6370000000 HC RX 637 (ALT 250 FOR IP): Performed by: STUDENT IN AN ORGANIZED HEALTH CARE EDUCATION/TRAINING PROGRAM

## 2025-02-04 PROCEDURE — 85520 HEPARIN ASSAY: CPT

## 2025-02-04 PROCEDURE — 36415 COLL VENOUS BLD VENIPUNCTURE: CPT

## 2025-02-04 PROCEDURE — 82947 ASSAY GLUCOSE BLOOD QUANT: CPT

## 2025-02-04 PROCEDURE — 2700000000 HC OXYGEN THERAPY PER DAY

## 2025-02-04 PROCEDURE — 97164 PT RE-EVAL EST PLAN CARE: CPT

## 2025-02-04 PROCEDURE — 6360000002 HC RX W HCPCS: Performed by: PHYSICIAN ASSISTANT

## 2025-02-04 PROCEDURE — 94761 N-INVAS EAR/PLS OXIMETRY MLT: CPT

## 2025-02-04 PROCEDURE — 6370000000 HC RX 637 (ALT 250 FOR IP): Performed by: INTERNAL MEDICINE

## 2025-02-04 PROCEDURE — 6370000000 HC RX 637 (ALT 250 FOR IP): Performed by: PHYSICIAN ASSISTANT

## 2025-02-04 PROCEDURE — 99233 SBSQ HOSP IP/OBS HIGH 50: CPT | Performed by: NURSE PRACTITIONER

## 2025-02-04 PROCEDURE — 94640 AIRWAY INHALATION TREATMENT: CPT

## 2025-02-04 PROCEDURE — 97168 OT RE-EVAL EST PLAN CARE: CPT

## 2025-02-04 RX ORDER — CLOPIDOGREL BISULFATE 75 MG/1
75 TABLET ORAL DAILY
Qty: 30 TABLET | Refills: 3 | Status: SHIPPED | OUTPATIENT
Start: 2025-02-05

## 2025-02-04 RX ORDER — CARVEDILOL 12.5 MG/1
6.25 TABLET ORAL 2 TIMES DAILY WITH MEALS
Qty: 60 TABLET | Refills: 3 | Status: SHIPPED | OUTPATIENT
Start: 2025-02-04

## 2025-02-04 RX ORDER — CARVEDILOL 12.5 MG/1
12.5 TABLET ORAL 2 TIMES DAILY WITH MEALS
Qty: 60 TABLET | Refills: 3 | Status: SHIPPED | OUTPATIENT
Start: 2025-02-04 | End: 2025-02-04

## 2025-02-04 RX ORDER — SODIUM BICARBONATE 650 MG/1
650 TABLET ORAL 2 TIMES DAILY
Qty: 60 TABLET | Refills: 0 | Status: SHIPPED | OUTPATIENT
Start: 2025-02-04 | End: 2025-03-06

## 2025-02-04 RX ORDER — ASPIRIN 81 MG/1
81 TABLET, CHEWABLE ORAL DAILY
Qty: 30 TABLET | Refills: 3 | Status: SHIPPED | OUTPATIENT
Start: 2025-02-05

## 2025-02-04 RX ORDER — CARVEDILOL 6.25 MG/1
6.25 TABLET ORAL 2 TIMES DAILY WITH MEALS
Status: DISCONTINUED | OUTPATIENT
Start: 2025-02-04 | End: 2025-02-05 | Stop reason: HOSPADM

## 2025-02-04 RX ORDER — ROSUVASTATIN CALCIUM 40 MG/1
40 TABLET, COATED ORAL NIGHTLY
Qty: 30 TABLET | Refills: 3 | Status: SHIPPED | OUTPATIENT
Start: 2025-02-04

## 2025-02-04 RX ADMIN — LEVALBUTEROL HYDROCHLORIDE 0.63 MG: 0.63 SOLUTION RESPIRATORY (INHALATION) at 19:58

## 2025-02-04 RX ADMIN — INSULIN GLARGINE 10 UNITS: 100 INJECTION, SOLUTION SUBCUTANEOUS at 20:30

## 2025-02-04 RX ADMIN — INSULIN GLARGINE 10 UNITS: 100 INJECTION, SOLUTION SUBCUTANEOUS at 08:24

## 2025-02-04 RX ADMIN — CARVEDILOL 12.5 MG: 12.5 TABLET, FILM COATED ORAL at 08:21

## 2025-02-04 RX ADMIN — ASPIRIN 81 MG 81 MG: 81 TABLET ORAL at 08:21

## 2025-02-04 RX ADMIN — ROSUVASTATIN CALCIUM 40 MG: 20 TABLET, FILM COATED ORAL at 20:30

## 2025-02-04 RX ADMIN — IPRATROPIUM BROMIDE 0.5 MG: 0.5 SOLUTION RESPIRATORY (INHALATION) at 19:59

## 2025-02-04 RX ADMIN — SODIUM CHLORIDE, PRESERVATIVE FREE 10 ML: 5 INJECTION INTRAVENOUS at 20:31

## 2025-02-04 RX ADMIN — INSULIN LISPRO 2 UNITS: 100 INJECTION, SOLUTION INTRAVENOUS; SUBCUTANEOUS at 20:30

## 2025-02-04 RX ADMIN — LEVALBUTEROL HYDROCHLORIDE 0.63 MG: 0.63 SOLUTION RESPIRATORY (INHALATION) at 08:40

## 2025-02-04 RX ADMIN — GUAIFENESIN 600 MG: 600 TABLET, EXTENDED RELEASE ORAL at 20:30

## 2025-02-04 RX ADMIN — SODIUM BICARBONATE 650 MG: 650 TABLET ORAL at 20:30

## 2025-02-04 RX ADMIN — SODIUM CHLORIDE, PRESERVATIVE FREE 10 ML: 5 INJECTION INTRAVENOUS at 08:21

## 2025-02-04 RX ADMIN — IPRATROPIUM BROMIDE 0.5 MG: 0.5 SOLUTION RESPIRATORY (INHALATION) at 08:40

## 2025-02-04 RX ADMIN — GUAIFENESIN 600 MG: 600 TABLET, EXTENDED RELEASE ORAL at 08:21

## 2025-02-04 RX ADMIN — SODIUM BICARBONATE 650 MG: 650 TABLET ORAL at 08:21

## 2025-02-04 RX ADMIN — CLOPIDOGREL BISULFATE 75 MG: 75 TABLET ORAL at 08:21

## 2025-02-04 ASSESSMENT — ENCOUNTER SYMPTOMS
BACK PAIN: 0
CHEST TIGHTNESS: 0
DIARRHEA: 0
COLOR CHANGE: 0
EYE ITCHING: 0
ABDOMINAL DISTENTION: 0
APNEA: 0
FACIAL SWELLING: 0
EYE DISCHARGE: 0
ABDOMINAL PAIN: 0
CONSTIPATION: 0
SHORTNESS OF BREATH: 1

## 2025-02-04 NOTE — PROGRESS NOTES
Occupational Therapy Re-Evaluation  Facility/Department: [facility]- [facility]   Patient Name: Elham Amado        MRN: [id number]    : 1945    Date of Service: 2025    No chief complaint on file.    Past Medical History:  has a past medical history of Anxiety, Cervicalgia, Diabetes mellitus (HCC), Dorsalgia, Hemiplegia (HCC), Hypertension, Mixed hyperlipidemia, Osteoporosis, and Stroke (cerebrum) ([facility]).  Past Surgical History:  has a past surgical history that includes back surgery; knee surgery; IR TUNNELED CVC PLACE WO SQ PORT/PUMP > 5 YEARS (2025); Cardiac procedure (N/A, 2025); and invasive vascular (Bilateral, 2025).    Discharge Recommendations  Discharge Recommendations: Patient would benefit from continued therapy after discharge  OT Equipment Recommendations  Equipment Needed: No    Assessment  Performance deficits / Impairments: Decreased functional mobility ;Decreased ADL status;Decreased strength;Decreased cognition;Decreased safe awareness;Decreased balance;Decreased posture;Decreased coordination;Decreased fine motor control;Decreased high-level IADLs;Decreased endurance  Assessment: Pt re-evaluated for acute OT services secondary to decline in functional performance following acute CVA (per chart). Pt was IND prior admission and now requiring Max A x2 for bed mobility with minimal effort shown to participate in task, and sitting balance fluctuating between CGA-Mod A EOB, requring more assist when RUE utilized in functional tasks d/t using for support to maintain balance. Pt declining further activity with therapy this date and requesting to return to bed. Per sons, pt has been very fatigued following HD sessions and attribute this as a factor to performance. Pt latest HD was yesterday. Pt remains minimally verbal throughout session and closing eyes at times to commands. Pt limited by above deficits impacting functional performance. Pt would benefit from continued therapy  step commands with repetition;Follows one step commands with increased time;Inconsistently follows commands  Attention Span: Attends with cues to redirect  Memory: Decreased recall of precautions  Safety Judgement: Decreased awareness of need for assistance;Decreased awareness of need for safety  Problem Solving: Assistance required to identify errors made;Assistance required to correct errors made;Assistance required to implement solutions;Assistance required to generate solutions  Insights: Decreased awareness of deficits  Initiation: Requires cues for some  Sequencing: Requires cues for some  Cognition Comment: Pt slow to respond. Closing eyes at times when asked some commands. Non verbal throughout session until end when pt asks to return to bed    Activities of Daily Living  Feeding: Minimal assistance;Setup  Grooming: Minimal assistance  Grooming Skilled Clinical Factors: pt initally nods head when asked if wanting to brush hair while seated EOB, however once presented with hairbush, shaking head and adamently refusing  UE Bathing: Moderate assistance  LE Bathing: Maximum assistance  UE Dressing: Moderate assistance  LE Dressing: Maximum assistance  LE Dressing Skilled Clinical Factors: Assist needed to adjust pt socks. Pt resistant to cuing to assess functional reach  Toileting: Maximum assistance  Additional Comments: Scores based on clinical reasoning unless otherwise stated. Pt cognition, weakness, and balance impacting overall performance. Max encouragement needed to participate with minimal sucess.    Balance  Balance  Sitting: With support (Mod A-CGA fluctuating support. Pt with R lateral lean and attempting to use RUE to maintain balance. Increased assist needed when RUE utilized in functional tasks. ~10 mins total)  Standing:  (NADER)    Transfers/Mobility  Bed mobility  Supine to Sit: Maximum assistance;2 Person assistance  Sit to Supine: Maximum assistance;2 Person assistance  Scooting: Maximal

## 2025-02-04 NOTE — CARE COORDINATION
CHELSEY contacted [facility] CM due to new documentation reporting pt is going home with hospice care, unsure if continuing with dialysis outpatient. CHELSEY met with pt's sons in room, per Heladio, they are going to go home with hospice and decline further dialysis treatments outpatient. SW provided emotional support. CHELSEY contacted [facility]  to inform of update. SW cancelled [facility] referral via portal. SW available for further support as needed.

## 2025-02-04 NOTE — PROGRESS NOTES
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  New hemodialysis start.  Monday Wednesday Friday.  Has tunneled catheter in place.  Cardiomyopathy ejection fraction 25%  New onset left cerebellar and left MCA PCA infarct  Hypertension    PLAN     Hemodialysis tomorrow  Fluid/salt restriction  Okay to discharge from nephrology standpoint once outpatient hemodialysis spot secured at [place] Monday Wednesday Friday      SUBJECTIVE     Known case of chronic kidney disease stage IV with poor follow-ups presented with shortness of breath/chest pain secondary to NSTEMI/CHF with a echo revealing 25% ejection fraction.  Initiated hemodialysis for diuretic resistance    Underwent hemodialysis yesterday.  Uneventful.  Tolerating the procedure well.  Blood pressure stable.  Appetite decent.  OBJECTIVE     Vitals:    02/04/25 0000 02/04/25 0400 02/04/25 0549 02/04/25 0800   BP: 127/71 (!) 110/58  129/72   Pulse: 74 72  71   Resp: 18 20  18   Temp: 98.2 °F (36.8 °C) 98.2 °F (36.8 °C)  98.2 °F (36.8 °C)   TempSrc: Oral Oral  Oral   SpO2: 95% 99%  99%   Weight:   60.1 kg (132 lb 7.9 oz)    Height:         24HR INTAKE/OUTPUT:    Intake/Output Summary (Last 24 hours) at 2/4/2025 0957  Last data filed at 2/3/2025 1424  Gross per 24 hour   Intake 300 ml   Output 1256 ml   Net -956 ml       General appearance:Awake, alert, in no acute distress  HEENT: PERRLA  Respiratory::vesicular breath sounds,no wheeze/crackles  Cardiovascular:S1 S2 normal,no gallop or organic murmur.  Abdomen:Non tender/non distended.Bowel sounds present  Extremities: No Cyanosis or Clubbing,Lower extremity edema  Neurological:Alert and oriented.No abnormalities of mood, affect, memory, mentation, or behavior are noted      MEDICATIONS     Scheduled Meds:    carvedilol  12.5 mg Oral BID WC    sodium bicarbonate  650 mg Oral 4x Daily    rosuvastatin  40 mg Oral Nightly    clopidogrel  75 mg Oral Daily    [Held by provider]

## 2025-02-04 NOTE — PROGRESS NOTES
Speech Language Pathology  [facility]    Speech Language Treatment Note    Date: 2/4/2025  Patient’s Name: Elham Amado  MRN: [id number]  Diagnosis:   Patient Active Problem List   Diagnosis Code    SHANITA (acute kidney injury) ([facility]) N17.9    Hyperglycemia due to diabetes mellitus ([facility]) E11.65    Acute renal failure with tubular necrosis ([facility]) N17.0    Stage 4 chronic kidney disease ([facility]) N18.4    Hypertension, essential I10    Urinary retention with incomplete bladder emptying R33.9    Hemiparesis affecting left side as late effect of cerebrovascular accident (CVA) ([facility]) I69.354    Mixed hyperlipidemia E78.2    Cervicalgia M54.2    Congestive heart failure ([facility]) I50.9    Persistent proteinuria R80.1    CAD, multiple vessel I25.10    Dependence on renal dialysis ([facility]) Z99.2    Infarction of right basal ganglia ([facility]) I63.9    Status post multiple cerebral infarctions Z86.73    Severe vascular dementia ([facility]) F01.C0    Acute ischemic left internal carotid artery (ICA) stroke ([facility]) I63.232    Acute cerebrovascular accident (CVA) due to occlusion of left posterior cerebral artery ([facility]) I63.532    Ischemic cerebrovascular accident (CVA) due to atherosclerosis of large intracranial artery ([facility]) I63.59    Acute unilateral cerebral infarction in a watershed distribution ([facility]) I63.89    Congestive heart failure with unknown left ventricular ejection fraction ([facility]) I50.9    ESRD (end stage renal disease) ([facility]) N18.6    Cerebrovascular accident (CVA) ([facility]) I63.9       Pain: 0/10    Speech and Language Treatment  Treatment time: 2119-8922    Subjective: [] Alert [] Cooperative     [x] Confused     [] Agitated    [x] Lethargic      Objective/Assessment:    Auditory Comprehension:   1 step commands: 0/3 not increased with max verbal/visual cues    Yes/no questions simple: 1/4 not increased with max verbal/visual cues    Verbal Expression:   Verbalizing bio/orientation info: 0/5 did not increase with max verbal

## 2025-02-04 NOTE — CONSULTS
Physical Medicine & Rehabilitation  Consult Note      Admitting Physician:   Johanna Laura MD    Primary Care Provider:   Thee Kay MD     Reason for Consult:  Acute Inpatient Rehabilitation    Chief Complaint: Shortness of breath    History of Present Illness:  Referring Provider is requesting an evaluation for appropriate placement upon discharge from acute care. History from chart review and family    Elham Amado is a 80 y.o. female admitted to [facility] on 1/24/2025.      6-year-old female admitted with shortness of breath and management of CHF has history of diabetes hypertension hyperlipidemia and CVA she has no history of COPD placements placed on BiPAP had elevated creatinine 3.4 baseline 1.5 elevated D-dimer chest x-ray shows small pleural effusion patient placed on heparin drip ejection fraction RV 25% transfer to [facility] 2/3 acute systolic CHF new onset cardiomyopathy ejection fraction 25% NSTEMI, SHANITA on CKD acute hypoxic respite failure secondary to CHF and pneumonia with COPD exacerbation does have history of CVA with left cerebellar infarct likely cardioembolic cannot exclude hypotension/watershed or artery to artery embolization cath showed multivessel disease CT surgery consulted for possible CABG recommend medical therapy continue aspirin statin and Plavix on IV heparin multifocal stenosis left ICA, HALEY, and MCA endovascular recommend DSA await timing inpatient versus outpatient blood pressure medication adjusted will need LifeVest continue GDMT    Endovascular 2/2 left cerebral infarct likely cardioembolic multifocal punctate infarcts multifocal stenosis right HALEY and MCA and left ICA, HALEY and MCA continue aspirin Plavix plan for DSA once medically optimized consider ICA stent placement if indicated    CT surgery not recommend CABG medical management    Internal medicine-acute renal failure requiring hemodialysis check tunneled catheter placed nephrology on  MD Bowen at [facility] CARDIAC CATH LAB    IR TUNNELED CVC PLACE WO SQ PORT/PUMP > 5 YEARS  1/27/2025    IR TUNNELED CVC PLACE WO SQ PORT/PUMP > 5 YEARS 1/27/2025 Amor Rodarte MD [facility] SPECIAL PROCEDURES    KNEE SURGERY      left knee/ shattered       Allergies:    Allergies   Allergen Reactions    Aricept [Donepezil Hcl] Diarrhea     Diarrhea    Namenda [Memantine Hcl] Other (See Comments)     Encephalopathy        Current Medications:   Current Facility-Administered Medications: carvedilol (COREG) tablet 6.25 mg, 6.25 mg, Oral, BID WC  sodium bicarbonate tablet 650 mg, 650 mg, Oral, 4x Daily  rosuvastatin (CRESTOR) tablet 40 mg, 40 mg, Oral, Nightly  labetalol (NORMODYNE;TRANDATE) injection 10 mg, 10 mg, IntraVENous, Q4H PRN  clopidogrel (PLAVIX) tablet 75 mg, 75 mg, Oral, Daily  ALPRAZolam (XANAX) tablet 0.25 mg, 0.25 mg, Oral, TID PRN  [Held by provider] amLODIPine (NORVASC) tablet 5 mg, 5 mg, Oral, BID  heparin (porcine) injection 1,600 Units, 1,600 Units, IntraCATHeter, PRN  heparin (porcine) injection 1,600 Units, 1,600 Units, IntraCATHeter, PRN  insulin glargine (LANTUS) injection vial 10 Units, 10 Units, SubCUTAneous, BID  ipratropium (ATROVENT) 0.02 % nebulizer solution 0.5 mg, 0.5 mg, Nebulization, BID  levalbuterol (XOPENEX) nebulization 0.63 mg, 0.63 mg, Nebulization, BID RT  melatonin tablet 3 mg, 3 mg, Oral, Nightly PRN  sodium chloride flush 0.9 % injection 5-40 mL, 5-40 mL, IntraVENous, 2 times per day  sodium chloride flush 0.9 % injection 10 mL, 10 mL, IntraVENous, PRN  0.9 % sodium chloride infusion, , IntraVENous, PRN  ondansetron (ZOFRAN-ODT) disintegrating tablet 4 mg, 4 mg, Oral, Q8H PRN **OR** ondansetron (ZOFRAN) injection 4 mg, 4 mg, IntraVENous, Q6H PRN  polyethylene glycol (GLYCOLAX) packet 17 g, 17 g, Oral, Daily PRN  acetaminophen (TYLENOL) tablet 650 mg, 650 mg, Oral, Q6H PRN **OR** acetaminophen (TYLENOL) suppository 650 mg, 650 mg, Rectal, Q6H PRN  insulin lispro (HUMALOG,ADMELOG)

## 2025-02-04 NOTE — PROGRESS NOTES
Physical Therapy  Facility/Department: [facility] CAR 2- STEPDOWN   Physical Therapy Initial Evaluation    Patient Name: Elham Amado        MRN: [id number]    : 1945    Date of Service: 2025    Past Medical History:  has a past medical history of Anxiety, Cervicalgia, Diabetes mellitus (HCC), Dorsalgia, Hemiplegia (HCC), Hypertension, Mixed hyperlipidemia, Osteoporosis, and Stroke (cerebrum) (HCC).  Past Surgical History:  has a past surgical history that includes back surgery; knee surgery; IR TUNNELED CVC PLACE WO SQ PORT/PUMP > 5 YEARS (2025); Cardiac procedure (N/A, 2025); and invasive vascular (Bilateral, 2025).    Discharge Recommendations  Discharge Recommendations: Patient would benefit from continued therapy after discharge  PT Equipment Recommendations  Equipment Needed: Yes  Mobility Devices: Wheelchair  Wheelchair: Transport Chair  Other: Pending discharge disposition, pt may require WC transportation available to improve community negotiation.    Assessment  Body Structures, Functions, Activity Limitations Requiring Skilled Therapeutic Intervention: Decreased functional mobility , Decreased strength, Decreased endurance, Decreased balance, Decreased ROM  Assessment: Pt is maxA+2 in bed mobility, declined attempting to transfer d/t weakness and fatigue. Pt has generalized weakness from prolonged hospitalization superimposed on pre-existing L sided weakness. Pt fatigues rapidly at this time, is unable to tolerate prolonged mobility. Pt is currently unsafe to return to prior living arrangements. Pt would benefit from continued acute PT to address deficits.  Therapy Prognosis: Good  Decision Making: Medium Complexity  Requires PT Follow-Up: Yes  Activity Tolerance  Activity Tolerance: Patient limited by endurance, Patient limited by fatigue  Safety Devices  Type of Devices: Nurse notified, Left in bed, Call light within reach, All fall risk precautions in place, Patient at risk for  deficits  Initiation: Requires cues for some  Sequencing: Requires cues for some  Cognition Comment: Pt slow to respond. Closing eyes at times when asked some commands. Non verbal throughout session until end when pt asks to return to bed    AROM RLE (degrees)  RLE General AROM: No AROM hip flexion, PROM WFL. WFL knee and ankle.  AROM LLE (degrees)  LLE General AROM: No AROM hip flexion, PROM WFL. Knee extension AROM lacking ~45 degrees, PROM WFL. No AROM dorsiflexion, PROM lacking ~10 degrees from neutral. Plantarflexion WFL.  AROM RUE (degrees)  RUE General AROM: See OT  AROM LUE (degrees)  LUE General AROM: See OT      Strength RLE  R Hip Flexion: 1/5  R Knee Extension: 2/5  R Ankle Dorsiflexion: 3+/5  R Ankle Plantar flexion: 3+/5  Strength LLE  L Hip Flexion: 1/5  L Knee Extension: 1/5  L Ankle Dorsiflexion: 1/5  L Ankle Plantar Flexion: 3/5  Strength RUE  Comment: See OT  Strength LUE  Comment: See OT    Mobility   Bed mobility  Supine to Sit: Maximum assistance;2 Person assistance  Sit to Supine: Maximum assistance;2 Person assistance  Scooting: Maximal assistance  Bed Mobility Comments: Pt minimally engaging in bed mobility with RUE to assist    Transfers  Sit to Stand: Moderate Assistance  Stand to Sit: Moderate Assistance  Comment: Pt declined STS attempt at this time d/t weakness, fatigue.    Balance   Balance  Posture: Poor  Sitting - Static: Poor  Sitting - Dynamic: Poor  Comments: Sitting balance assessed EOB, pt maintains R posterolateral lean throughout.    Patient Education  Patient Education  Education Given To: Patient;Family  Education Provided: Role of Therapy;Plan of Care  Education Method: Verbal;Demonstration  Barriers to Learning: Cognition  Education Outcome: Continued education needed    Plan  Physical Therapy Plan  General Plan:  (6x/week.)  Current Treatment Recommendations: Strengthening, Balance training, Functional mobility training, Transfer training, Endurance training, Gait

## 2025-02-04 NOTE — PROGRESS NOTES
Comprehensive Nutrition Assessment    Type and Reason for Visit:  Reassess    Nutrition Recommendations/Plan:   Continue current diet.  Continue high sal/high pro ONS BID - likes strawberry.  Encourage intakes.  Will monitor labs, weights, intake, GI status, and plan of care.     Malnutrition Assessment:  Malnutrition Status:  Insufficient data (01/31/25 1138)    Context:  Acute Illness     Findings of the 6 clinical characteristics of malnutrition:  Energy Intake:  Mild decrease in energy intake  Weight Loss:  No weight loss     Body Fat Loss:  Unable to assess     Muscle Mass Loss:  Unable to assess    Fluid Accumulation:  Mild Extremities   Strength:  Not Performed    Nutrition Assessment:    F/U. Pt has had very low intakes since last visit. <10%. ONS re-ordered, family states pt is not drinking as much. Family stated pt at more breakfast today then previous days. Possible discharge to acute rehab. Will continue to follow.    Nutrition Related Findings:    Edema: +1 BLE. LBM 2/2. Labs/meds reviewed. Wound Type: None       Current Nutrition Intake & Therapies:    Average Meal Intake: 1-25%, 0%  Average Supplements Intake: Unable to assess  ADULT DIET; Regular; 3 carb choices (45 gm/meal); Low Sodium (2 gm); Low Potassium (Less than 3000 mg/day); Low Phosphorus (Less than 1000 mg)  ADULT ORAL NUTRITION SUPPLEMENT; Breakfast, Lunch, Dinner; Standard High Calorie/High Protein Oral Supplement    Anthropometric Measures:  Height: 147.3 cm (4' 9.99\")  Ideal Body Weight (IBW): 90 lbs (41 kg)       Current Body Weight: 56.1 kg (123 lb 10.9 oz), 137.4 % IBW.    Current BMI (kg/m2): 25.9  Usual Body Weight: 58.5 kg (129 lb) (9/17/24)     % Weight Change (Calculated): -4.1    BMI Categories: Overweight (BMI 25.0-29.9)    Estimated Daily Nutrient Needs:  Energy Requirements Based On: Kcal/kg  Weight Used for Energy Requirements: Current  Energy (kcal/day): 1716-7250 kcal/d per 25-30 kcal/kg  Weight Used for Protein

## 2025-02-04 NOTE — PROGRESS NOTES
[facility]   Progress Note                   Date:   2/4/2025  Patient name: Elham Amado  Date of admission:  1/24/2025  7:47 PM  MRN:   [id number]  YOB: 1945  PCP: Thee Kay MD    Reason for Admission: Congestive heart failure with unknown left ventricular ejection fraction (HCC) [I50.9]    Subjective:       Clinical Changes / Abnormalities: Pt seen and examined in the room with 2 sons.  Pt denies any CP or sob today.  Labs, vitals and tele reviewed.     Medications:   Scheduled Meds:   carvedilol  6.25 mg Oral BID WC    sodium bicarbonate  650 mg Oral 4x Daily    rosuvastatin  40 mg Oral Nightly    clopidogrel  75 mg Oral Daily    [Held by provider] amLODIPine  5 mg Oral BID    insulin glargine  10 Units SubCUTAneous BID    ipratropium  0.5 mg Nebulization BID    levalbuterol  0.63 mg Nebulization BID RT    sodium chloride flush  5-40 mL IntraVENous 2 times per day    insulin lispro  0-8 Units SubCUTAneous 4x Daily AC & HS    aspirin  81 mg Oral Daily    guaiFENesin  600 mg Oral BID     Continuous Infusions:   sodium chloride      dextrose       CBC:   Recent Labs     02/02/25 0217   WBC 14.7*   HGB 12.4        BMP:    Recent Labs     02/02/25  0217 02/02/25  1801 02/03/25 0215   * 135* 137   K 3.3* 3.9 3.7    100 103   CO2 19* 20 21   BUN 43* 53* 51*   CREATININE 3.6* 4.0* 3.8*   GLUCOSE 91 195* 77     Hepatic:   Recent Labs     02/02/25 0217   AST 29   ALT <5*   BILITOT 0.5   ALKPHOS 103       Troponin:   No results for input(s): \"TROPHS\" in the last 72 hours.    BNP: No results for input(s): \"BNP\" in the last 72 hours.  Lipids:   No results for input(s): \"CHOL\", \"HDL\" in the last 72 hours.    Invalid input(s): \"LDLCALCU\"    INR:   No results for input(s): \"INR\" in the last 72 hours.      EKG:   Normal sinus rhythm, no acute ST-T wave changes      ECHO: 1/24/2025    Left Ventricle: Severely reduced left ventricular systolic function. EF by 2D  Simpsons Biplane is 25%. Left ventricle is smaller than normal. Mildly increased wall thickness. See diagram for wall motion findings.    Aortic Valve: Trileaflet valve. Mildly thickened cusps.    Mitral Valve: Mild annular calcification. Mildly thickened leaflets. Mild regurgitation. Mild stenosis noted. MV mean gradient is 6 mmHg.    Tricuspid Valve: Mild regurgitation. The estimated RVSP is 37 mmHg.    Pericardium: Trivial localized pericardial effusion present around the right ventricle. No indication of cardiac tamponade. Evidence includes no IVC dilation and no chamber collapse.    Image quality is adequate.    CARDIAC CATH 1/28/25    Severe multivessel disease    Severe stenosis of LAD from proximal to distal region    Ostial left circumflex artery has moderate stenosis, distal left circumflex artery has severe stenosis    Severe ostial stenosis of RCA that is moderate vessel, RCA is a nondominant vessel, RCA is diffusely diseased from proximal to distal region.    CT surgery consult, will discuss with CT surgery team    I talked to the patient son in detail regarding the complex coronary disease with age and low EF and the complexity of fixing the lesions, I discussed with the patient likely patient is not a good candidate for CT surgery but we will take their input and recommendations, I explained him in detail the risk and benefits of Impella guided PCI of LAD at least and he recommend medical treatment only as she has lived her life and she does not want to lose her during the procedure.    Continue goal-directed medical therapy, aspirin and high intensity statin  Objective:   Vitals: /72   Pulse 71   Temp 98.2 °F (36.8 °C) (Oral)   Resp 18   Ht 1.473 m (4' 9.99\")   Wt 60.1 kg (132 lb 7.9 oz)   SpO2 99%   BMI 27.70 kg/m²   General appearance: alert and cooperative with exam  HEENT: Head: Normocephalic, no lesions, without obvious abnormality.  Neck: no JVD, trachea midline, no

## 2025-02-04 NOTE — PLAN OF CARE
Problem: Respiratory - Adult  Goal: Achieves optimal ventilation and oxygenation  2/3/2025 2050 by Grace Barrett, SALVADOR  Outcome: Progressing   BRONCHOSPASM/BRONCHOCONSTRICTION     [x]         IMPROVE AERATION/BREATH SOUNDS  [x]   ADMINISTER BRONCHODILATOR THERAPY AS APPROPRIATE  [x]   ASSESS BREATH SOUNDS  []   IMPLEMENT AEROSOL/MDI PROTOCOL  [x]   PATIENT EDUCATION AS NEEDED

## 2025-02-04 NOTE — PROGRESS NOTES
Endovascular Neurosurgery Progress Note    Pt Name: Elham Amado  MRN: [id number]  YOB: 1945  Date of evaluation: 2/4/2025  Primary Care Physician: Thee Kay MD  Patient evaluated at the request of  Dr. Montez  Reason for evaluation: ICAD and concern for new left clinoid ICA occlusion     SUBJECTIVE:     NAEO from EVN standpoint. Patient remains with unchanged neurological examination.        History of Chief Complaint:    The patient is an 80-year-old non-, non- female who arrived at [place] [facility] via EMS on January 24, 2025, due to shortness of breath and wheezing. She was later transferred to [place] for further evaluation of acute kidney injury and acute congestive heart failure. Her past medical history includes hypertension, hyperlipidemia, type 2 diabetes mellitus, a previous right basal ganglia stroke in 1995 with residual left-sided hemiplegia, osteoarthritis, cervicalgia, and anxiety.The patient lives in her own home but relies on her son for daily assistance. She has chronic left-sided hemiplegia from her previous stroke, with baseline strength of 2/5 in her left arm and 3/5 in her left leg. Despite this, she can walk short distances without a walker. She has recently been diagnosed with renal failure requiring hemodialysis  Neurology was consulted for clearance for CABG, bilateral carotid ultrasound that showed   Mild and calcific plaque in the right internal carotid artery without evidence of hemodynamically significant stenosis.    Mild and calcific plaque in the left internal carotid artery without evidence of hemodynamically significant stenosis.    Normal antegrade flow involving the right vertebral artery.    Normal antegrade flow involving the left vertebral artery.    MRI showed subacute infarct at the left parietal lobe, with old left occipital infarct, and several bilateral old basal ganglia lacunar infarct extended to bilateral corona radiator, with

## 2025-02-04 NOTE — CARE COORDINATION
Dx: Congestive heart failure with unknown left ventricular ejection fraction (HCC) [I50.9]    Admit date: 1/24/2025  Hospital day:11      ______________________________________      Patients goal/ Transitional Planning:   Spoke with patients son, agreeable to Home with hospice, freedom of choice list given stated they will need transport home.    PT/OT recs:      Continued needs/services: Hospice consult, transportation      [facility] RISK OF UNPLANNED READMISSION 2.0             16.6 Total Score        1600 Spoke with Patients son, Hospice choice is [facility], spoke with Della, fax # [phone], referral hand faxed

## 2025-02-04 NOTE — PROGRESS NOTES
[facility]  Office: [phone]  Julian Banegas DO, Ronald Drake DO, Олег Weeks DO, José Sanchez DO, Demarco Delgado MD, Chaya Cyr MD, Tommy Madrigal MD, Ness Hernadez MD,  Waldo Jessica MD, Jose G iRce MD, Johanna Laura MD,  Minal Guerra DO, Mady Hurt MD, Fam Jiang MD, Amor Banegas DO, Mayi Moise MD,  Isaac Dickson DO, Iqra Hampton MD, Marianna Bradford MD, Munira Anderson MD, Steph Abernathy MD,  Alexis Powell MD, Heidi Body MD, Natan Alexis MD, Ishaan Gant MD, Rod Plasencia MD, Beverly Cooney MD, Hany Yañez DO, Dakota Johnson MD, Minal Boyle MD, Mohsin Reza, MD, Shirley Waterhouse, CNP,  Amanda Molina CNP, Hany Mcallister, CNP,  Dina Rader, DNP, Angelique Caruso, CNP, Sharon Chavarria, CNP, Geneva Vargas, CNP, Diane Clay, CNP, Natasha Vazquez, PA-C, Reva Aceves, CNP, Diego Kaplan, CNP,  Pili Ramos, CNP, Valarie Aguilar, CNP, Samantha Castillo, CNP,  Nikki Causey, CNS, Maye Jackman CNP, Gladys Whitlock CNP,   Wendy Rubio, CNP         [facility]   IN-PATIENT SERVICE   [facility]    Progress Note    2/4/2025    11:37 AM    Name:   Elham Amado  MRN:     [id number]     Acct:      [id number]   Room:   2027/2027-01  IP Day:  11  Admit Date:  1/24/2025  7:47 PM    PCP:   Thee Kay MD  Code Status:  DNR-CCA    Subjective:     C/C: No chief complaint on file.    Interval History Status: worsened.     Patient seen and examined.  Originally was seen and examined with patient's sons at bedside.  Unfortunately patient continues to be severely deconditioned while being in the hospital.  Has been 11 days since patient has been in [facility].  Unfortunately her prognosis continues to deteriorate.    Brief History:     80-year-old female past medical history of diabetes type 2, hyperlipidemia, hypertension, CVA presented for shortness of breath concern for pneumonia patient was transferred to [facility]  02/03/25 0215   * 135* 137   K 3.3* 3.9 3.7    100 103   CO2 19* 20 21   GLUCOSE 91 195* 77   BUN 43* 53* 51*   CREATININE 3.6* 4.0* 3.8*   MG 2.4  --   --    ANIONGAP 16 15 13   LABGLOM 12* 11* 11*   CALCIUM 9.4 9.0 9.1   CKTOTAL 102  --   --      Recent Labs     02/02/25  0217 02/02/25  0723 02/02/25  1621 02/02/25  1941 02/03/25  0742 02/03/25  1550 02/03/25 2003 02/04/25  0823   AST 29  --   --   --   --   --   --   --    ALT <5*  --   --   --   --   --   --   --    ALKPHOS 103  --   --   --   --   --   --   --    BILITOT 0.5  --   --   --   --   --   --   --    POCGLU  --    < > 192* 169* 86 175* 212* 73    < > = values in this interval not displayed.     ABG:  Lab Results   Component Value Date/Time    POCPH 7.396 01/24/2025 11:22 AM    POCPCO2 33.2 01/24/2025 11:22 AM    POCPO2 67.5 01/24/2025 11:22 AM    POCHCO3 20.4 01/24/2025 11:22 AM    NBEA 3.7 01/24/2025 11:22 AM    WNUF0FWR 93.3 01/24/2025 11:22 AM    FIO2 INFORMATION NOT PROVIDED 01/24/2025 10:59 PM     Lab Results   Component Value Date/Time    SPECIAL RIGHT HAND 1ML 01/24/2025 11:00 AM     Lab Results   Component Value Date/Time    CULTURE NO GROWTH 5 DAYS 01/24/2025 11:00 AM       Radiology:  MRI LIMITED BRAIN    Result Date: 2/2/2025  1. Redemonstration of an acute infarct within the left posteroinferior cerebellar artery territory involving the medial aspect of the left cerebellum. 2. Scattered subcentimeter acute infarcts within the left temporal and parietal lobes, unchanged. 3. No new infarct or acute intracranial hemorrhage. 4. Remote lacunar infarcts within the basal ganglia, mild diffuse cerebral volume loss and moderate chronic small vessel ischemic changes.     MRI BRAIN WO CONTRAST    Result Date: 2/1/2025  1. Minor patchy subacute infarct at the left parietal lobe with predominantly confluent infarct at the left PCA distribution. 2. No mass effect or midline shift. 3. Marked diffuse cerebral atrophy and moderate chronic

## 2025-02-05 VITALS
OXYGEN SATURATION: 98 % | BODY MASS INDEX: 27.81 KG/M2 | HEART RATE: 61 BPM | HEIGHT: 58 IN | SYSTOLIC BLOOD PRESSURE: 145 MMHG | RESPIRATION RATE: 16 BRPM | WEIGHT: 132.5 LBS | TEMPERATURE: 97.3 F | DIASTOLIC BLOOD PRESSURE: 70 MMHG

## 2025-02-05 LAB
ANTI-XA UNFRAC HEPARIN: <0.1 IU/L
GLUCOSE BLD-MCNC: 109 MG/DL (ref 65–105)
GLUCOSE BLD-MCNC: 134 MG/DL (ref 65–105)

## 2025-02-05 PROCEDURE — 6360000002 HC RX W HCPCS: Performed by: PHYSICIAN ASSISTANT

## 2025-02-05 PROCEDURE — 94761 N-INVAS EAR/PLS OXIMETRY MLT: CPT

## 2025-02-05 PROCEDURE — 85520 HEPARIN ASSAY: CPT

## 2025-02-05 PROCEDURE — 94640 AIRWAY INHALATION TREATMENT: CPT

## 2025-02-05 PROCEDURE — 99231 SBSQ HOSP IP/OBS SF/LOW 25: CPT | Performed by: STUDENT IN AN ORGANIZED HEALTH CARE EDUCATION/TRAINING PROGRAM

## 2025-02-05 PROCEDURE — 99233 SBSQ HOSP IP/OBS HIGH 50: CPT | Performed by: NURSE PRACTITIONER

## 2025-02-05 PROCEDURE — 6370000000 HC RX 637 (ALT 250 FOR IP): Performed by: STUDENT IN AN ORGANIZED HEALTH CARE EDUCATION/TRAINING PROGRAM

## 2025-02-05 PROCEDURE — 82947 ASSAY GLUCOSE BLOOD QUANT: CPT

## 2025-02-05 PROCEDURE — 6370000000 HC RX 637 (ALT 250 FOR IP): Performed by: PHYSICIAN ASSISTANT

## 2025-02-05 PROCEDURE — 6370000000 HC RX 637 (ALT 250 FOR IP): Performed by: INTERNAL MEDICINE

## 2025-02-05 PROCEDURE — 6370000000 HC RX 637 (ALT 250 FOR IP): Performed by: NURSE PRACTITIONER

## 2025-02-05 PROCEDURE — 99233 SBSQ HOSP IP/OBS HIGH 50: CPT | Performed by: PSYCHIATRY & NEUROLOGY

## 2025-02-05 PROCEDURE — 36415 COLL VENOUS BLD VENIPUNCTURE: CPT

## 2025-02-05 RX ADMIN — CLOPIDOGREL BISULFATE 75 MG: 75 TABLET ORAL at 10:41

## 2025-02-05 RX ADMIN — CARVEDILOL 6.25 MG: 6.25 TABLET, FILM COATED ORAL at 10:41

## 2025-02-05 RX ADMIN — IPRATROPIUM BROMIDE 0.5 MG: 0.5 SOLUTION RESPIRATORY (INHALATION) at 09:39

## 2025-02-05 RX ADMIN — ASPIRIN 81 MG 81 MG: 81 TABLET ORAL at 10:41

## 2025-02-05 RX ADMIN — SODIUM BICARBONATE 650 MG: 650 TABLET ORAL at 10:41

## 2025-02-05 RX ADMIN — GUAIFENESIN 600 MG: 600 TABLET, EXTENDED RELEASE ORAL at 10:41

## 2025-02-05 RX ADMIN — LEVALBUTEROL HYDROCHLORIDE 0.63 MG: 0.63 SOLUTION RESPIRATORY (INHALATION) at 09:39

## 2025-02-05 ASSESSMENT — ENCOUNTER SYMPTOMS
EYE ITCHING: 0
CONSTIPATION: 0
EYE DISCHARGE: 0
SHORTNESS OF BREATH: 1
ABDOMINAL PAIN: 0
ABDOMINAL DISTENTION: 0
BACK PAIN: 0
CHEST TIGHTNESS: 0
APNEA: 0
FACIAL SWELLING: 0
DIARRHEA: 0
COLOR CHANGE: 0

## 2025-02-05 NOTE — PLAN OF CARE
Problem: Chronic Conditions and Co-morbidities  Goal: Patient's chronic conditions and co-morbidity symptoms are monitored and maintained or improved  Outcome: Progressing     Problem: Discharge Planning  Goal: Discharge to home or other facility with appropriate resources  Outcome: Progressing     Problem: Respiratory - Adult  Goal: Achieves optimal ventilation and oxygenation  2/4/2025 2108 by Zuri Dickson RN  Outcome: Progressing  2/4/2025 2022 by Grace Barrett RCP  Outcome: Progressing     Problem: Safety - Adult  Goal: Free from fall injury  Outcome: Progressing     Problem: ABCDS Injury Assessment  Goal: Absence of physical injury  Outcome: Progressing     Problem: Skin/Tissue Integrity  Goal: Skin integrity remains intact  Description: 1.  Monitor for areas of redness and/or skin breakdown  2.  Assess vascular access sites hourly  3.  Every 4-6 hours minimum:  Change oxygen saturation probe site  4.  Every 4-6 hours:  If on nasal continuous positive airway pressure, respiratory therapy assess nares and determine need for appliance change or resting period  Outcome: Progressing     Problem: Pain  Goal: Verbalizes/displays adequate comfort level or baseline comfort level  Outcome: Progressing     Problem: Neurosensory - Adult  Goal: Remains free of injury related to seizures activity  Outcome: Progressing     Problem: Skin/Tissue Integrity - Adult  Goal: Skin integrity remains intact  Description: 1.  Monitor for areas of redness and/or skin breakdown  2.  Assess vascular access sites hourly  3.  Every 4-6 hours minimum:  Change oxygen saturation probe site  4.  Every 4-6 hours:  If on nasal continuous positive airway pressure, respiratory therapy assess nares and determine need for appliance change or resting period  Outcome: Progressing  Goal: Incisions, wounds, or drain sites healing without S/S of infection  Outcome: Progressing     Problem: Musculoskeletal - Adult  Goal: Return mobility to

## 2025-02-05 NOTE — CARE COORDINATION
Spoke to Della from [facility]. They have a meeting arranged at 4 pm at patient's home with her son. They are working on transportation and coordinating it with son. Notified her that patient needs to go to IR to have dialysis line taken out. She will call back    1225 received voicemail from Della from [facility]. Transportation is scheduled for 4:30 pm via [employer]. Patient's son aware    1313 spoke to Della from [facility]. She is requesting hospice order. Faxed    7425 notified Della from [facility] that patient discharged    Discharge Report    [facility]  Clinical Case Management Department  Written by: Brii Rich RN    Patient Name: Elham Amado  Attending Provider: Johanna Laura MD  Admit Date: 2025  7:47 PM  MRN: [id number]  Account: [id number]                     : 1945  Discharge Date: 2025      Disposition: home    Brii Rich RN

## 2025-02-05 NOTE — PROGRESS NOTES
Patient was discharged via ambulance with transportation to home hospice. Patient was discharged with all personal belongings. Any medications or prescriptions and discharge paperwork were given to transportation services. Report was given to transportation staff. Any questions were answered.

## 2025-02-05 NOTE — PROGRESS NOTES
Spoke with Gabbie from hospice regarding patient and potential discharge tomorrow. All questions answered about patients immediate needs. A nurse will be ready at time of discharge to meet family at home and begin admission process through hospice. Phone number for Gabbie is [phone]. She advised to call with any questions leading up to discharge. Care ongoing.

## 2025-02-05 NOTE — PLAN OF CARE
Problem: Chronic Conditions and Co-morbidities  Goal: Patient's chronic conditions and co-morbidity symptoms are monitored and maintained or improved  2/5/2025 1629 by Camryn Baez RN  Outcome: Adequate for Discharge  2/5/2025 1628 by Camryn Baez RN  Outcome: Progressing     Problem: Discharge Planning  Goal: Discharge to home or other facility with appropriate resources  2/5/2025 1629 by Camryn Baez RN  Outcome: Adequate for Discharge  2/5/2025 1628 by Camryn Baez RN  Outcome: Progressing     Problem: Respiratory - Adult  Goal: Achieves optimal ventilation and oxygenation  2/5/2025 1629 by Camryn Baez RN  Outcome: Adequate for Discharge  2/5/2025 1628 by Camryn Baez RN  Outcome: Progressing     Problem: Neurosensory - Adult  Goal: Remains free of injury related to seizures activity  2/5/2025 1629 by Camryn Baez RN  Outcome: Adequate for Discharge  2/5/2025 1628 by Camryn Baez RN  Outcome: Progressing     Problem: Skin/Tissue Integrity - Adult  Goal: Skin integrity remains intact  Description: 1.  Monitor for areas of redness and/or skin breakdown  2.  Assess vascular access sites hourly  3.  Every 4-6 hours minimum:  Change oxygen saturation probe site  4.  Every 4-6 hours:  If on nasal continuous positive airway pressure, respiratory therapy assess nares and determine need for appliance change or resting period  2/5/2025 1629 by Camryn Baez RN  Outcome: Adequate for Discharge  2/5/2025 1628 by Camryn Baez RN  Outcome: Progressing  Goal: Incisions, wounds, or drain sites healing without S/S of infection  2/5/2025 1629 by Camryn Baez RN  Outcome: Adequate for Discharge  2/5/2025 1628 by Camryn Baez RN  Outcome: Progressing     Problem: Musculoskeletal - Adult  Goal: Return mobility to safest level of function  2/5/2025 1629 by Camryn Baez RN  Outcome: Adequate for Discharge  2/5/2025 1628 by Camryn Baez RN  Outcome: Progressing     Problem: Genitourinary - Adult  Goal: Absence of urinary

## 2025-02-05 NOTE — DISCHARGE SUMMARY
[facility]  Office: [phone]  Julian Banegas DO, Ronald Drake DO, Олег Weeks DO, José Sanchez DO, Demarco Delgado MD, Chaya Cyr MD, Tommy Madrigal MD, Ness Hernadez MD,  Waldo Jessica MD, Jose G Rice MD, Johanna Laura MD,  Minal Guerra DO, Mady Hurt MD, Fam Jiang MD, Amor Banegas DO, Mayi Moise MD,  Isaac Dickson DO, Iqra Hampton MD, Marianna Bradford MD, Munira Anderson MD, Steph Abernathy MD,  Alexis Powell MD, Heidi Boyd MD, Natan Alexis MD, Ishaan Gant MD, Rod Plasencia MD, Beverly Cooney MD, Hany Yañez DO, Dakota Johnson MD, Minal Boyle MD, Mohsin Reza, MD, Shirley Waterhouse, CNP,  Amanda Molina CNP, Hany Mcallister, CNP,  Dina Rader, DNP, Angelique Caruso, CNP, Sharon Chavarria, CNP, Geneva Vargas, CNP, Diane Clay CNP, AYESHA Whitman-C, Reva Aceves, CNP, Diego Kaplan, CNP,  Pili Ramos, CNP, Valarie Aguilar, CNP, Samantha Castillo, CNP,  Nikki Causey, CNS, Maye Jackman CNP, Gladys Whitlock CNP,   Wendy Rubio, CNP         [facility]   IN-PATIENT SERVICE   [facility]    Discharge Summary     Patient ID: Elham Amado  :  1945   MRN: [id number]     ACCOUNT:  [id number]   Patient's PCP: Thee Kay MD  Admit Date: 2025   Discharge Date: 2025     Length of Stay: 12  Code Status:  DNR-CCA  Admitting Physician: No admitting provider for patient encounter.  Discharge Physician: Johanna Laura MD     Active Discharge Diagnoses:     Hospital Problem Lists:  Principal Problem:    Congestive heart failure (HCC)  Active Problems:    CAD, multiple vessel    SHANITA (acute kidney injury) (HCC)    Hyperglycemia due to diabetes mellitus (HCC)    Acute renal failure with tubular necrosis (HCC)    Stage 4 chronic kidney disease (HCC)    Essential hypertension    Hemiparesis affecting left side as late effect of cerebrovascular accident (CVA) (HCC)    Mixed hyperlipidemia

## 2025-02-05 NOTE — PROGRESS NOTES
[facility]   Progress Note                   Date:   2/5/2025  Patient name: Elham Amado  Date of admission:  1/24/2025  7:47 PM  MRN:   [id number]  YOB: 1945  PCP: Thee Kay MD    Reason for Admission: Congestive heart failure with unknown left ventricular ejection fraction (HCC) [I50.9]    Subjective:       Clinical Changes / Abnormalities: Pt seen and examined in the room.  Patient resting in bed. Pt lethargic.  Labs, vitals and tele reviewed- SR 72  Per report from RN- patient going to home with hospice today    Medications:   Scheduled Meds:   carvedilol  6.25 mg Oral BID WC    sodium bicarbonate  650 mg Oral 4x Daily    rosuvastatin  40 mg Oral Nightly    clopidogrel  75 mg Oral Daily    ipratropium  0.5 mg Nebulization BID    levalbuterol  0.63 mg Nebulization BID RT    sodium chloride flush  5-40 mL IntraVENous 2 times per day    aspirin  81 mg Oral Daily    guaiFENesin  600 mg Oral BID     Continuous Infusions:   sodium chloride      dextrose       CBC:   No results for input(s): \"WBC\", \"HGB\", \"PLT\" in the last 72 hours.    BMP:    Recent Labs     02/02/25  1801 02/03/25  0215   * 137   K 3.9 3.7    103   CO2 20 21   BUN 53* 51*   CREATININE 4.0* 3.8*   GLUCOSE 195* 77     Hepatic:   No results for input(s): \"AST\", \"ALT\", \"BILITOT\", \"ALKPHOS\" in the last 72 hours.    Invalid input(s): \"ALB\"      Troponin:   No results for input(s): \"TROPHS\" in the last 72 hours.    BNP: No results for input(s): \"BNP\" in the last 72 hours.  Lipids:   No results for input(s): \"CHOL\", \"HDL\" in the last 72 hours.    Invalid input(s): \"LDLCALCU\"    INR:   No results for input(s): \"INR\" in the last 72 hours.      EKG:   Normal sinus rhythm, no acute ST-T wave changes      ECHO: 1/24/2025    Left Ventricle: Severely reduced left ventricular systolic function. EF by 2D Simpsons Biplane is 25%. Left ventricle is smaller than normal. Mildly increased wall thickness. See diagram

## 2025-02-05 NOTE — PROGRESS NOTES
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  New hemodialysis start.  Monday Wednesday Friday.  Has tunneled catheter in place.   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  Hypertension    PLAN     Patient to go to IR for tunneled cath removal.   Nephrology will sign off at this time.    SUBJECTIVE   Patient seen and examined at bedside, in light of diminished cardiac function, discovery of significant endovascular disease and concern for further strokes, patient and family have made decision to discontinue dialysis, change CODE STATUS to DNR CCA and discharge home with hospice.     80-year-old female with past medical history of CKD stage IV, uncontrolled type 2 diabetes, hypertension, hyperlipidemia, and CVA who presented to the hospital with chief complaint of shortness of breath, initially found to have elevated BNP of 16,316 and bedside echo demonstrated EF of 25%.  She underwent cardiac catheterization on January 29 which showed severe multivessel coronary artery disease.  CT surgery to discuss management at conference meeting on Monday.  Initial lab work also demonstrated significantly elevated creatinine of 3.4 with GFR of 13.  Patient has history of noncompliance and was last seen by our service in August 2023 due to SHANITA.  At that time her baseline creatinine was deemed to be around 1.8.  Nephrology was consulted for SHANITA on CKD stage IV.  Patient was initiated on dialysis this admission.      OBJECTIVE     Vitals:    02/04/25 1958 02/05/25 0000 02/05/25 0400 02/05/25 0800   BP:  128/72 (!) 113/54 (!) 141/76   Pulse:  64 69 69   Resp: 20 18 16 18   Temp:  97.4 °F (36.3 °C) 98.2 °F (36.8 °C) 98.4 °F (36.9 °C)   TempSrc:  Oral Oral Axillary   SpO2:  98% 95% 97%   Weight:       Height:         24HR INTAKE/OUTPUT:  No intake or output data in the 24 hours ending 02/05/25 1022      General  appearance:Awake, alert, in no acute distress  HEENT: PERRLA  Respiratory::vesicular breath sounds,no wheeze/crackles  Cardiovascular:S1 S2 normal,no gallop or organic murmur.  Abdomen:Non tender/non distended.Bowel sounds present  Extremities: No Cyanosis or Clubbing,Lower extremity edema  Neurological:Alert and oriented.No abnormalities of mood, affect, memory, mentation, or behavior are noted      MEDICATIONS     Scheduled Meds:    carvedilol  6.25 mg Oral BID WC    sodium bicarbonate  650 mg Oral 4x Daily    rosuvastatin  40 mg Oral Nightly    clopidogrel  75 mg Oral Daily    ipratropium  0.5 mg Nebulization BID    levalbuterol  0.63 mg Nebulization BID RT    sodium chloride flush  5-40 mL IntraVENous 2 times per day    aspirin  81 mg Oral Daily    guaiFENesin  600 mg Oral BID     Continuous Infusions:    sodium chloride      dextrose       PRN Meds:  labetalol, ALPRAZolam, heparin (porcine), heparin (porcine), melatonin, sodium chloride flush, sodium chloride, ondansetron **OR** ondansetron, polyethylene glycol, acetaminophen **OR** acetaminophen, glucose, dextrose bolus **OR** dextrose bolus, glucagon (rDNA), dextrose, benzonatate  Home Meds:                Medications Prior to Admission: Magnesium 400 MG CAPS, Take by mouth three times a week  Cholecalciferol (VITAMIN D3) 50 MCG (2000 UT) CAPS, Take 1 capsule by mouth daily  Omega-3 Fatty Acids (FISH OIL) 1000 MG capsule, Take 1 capsule by mouth three times a week  zinc gluconate 50 MG tablet, Take 1 tablet by mouth daily  Multiple Vitamin (MULTI VITAMIN DAILY PO), Take by mouth  traMADol (ULTRAM) 50 MG tablet, Take 1 tablet by mouth every 6 hours as needed for Pain for up to 90 days. Max Daily Amount: 200 mg  LANTUS SOLOSTAR 100 UNIT/ML injection pen, INJECT 40 UNITS AT BEDTIME  [DISCONTINUED] amLODIPine (NORVASC) 5 MG tablet, Take 1 tablet by mouth daily  Insulin Pen Needle (PEN NEEDLES) 32G X 4 MM MISC, 1 Needle by Does not apply route daily  Continuous

## 2025-02-05 NOTE — PROGRESS NOTES
Endovascular Neurosurgery Progress Note    Pt Name: Elham Amado  MRN: [id number]  YOB: 1945  Date of evaluation: 2/5/2025  Primary Care Physician: Thee Kay MD  Patient evaluated at the request of  Dr. Montez  Reason for evaluation: ICAD and concern for new left clinoid ICA occlusion     SUBJECTIVE:     NAEO from EVN standpoint. Patient remains with unchanged neurological examination.        History of Chief Complaint:    The patient is an 80-year-old non-, non- female who arrived at [facility] via EMS on January 24, 2025, due to shortness of breath and wheezing. She was later transferred to [facility] for further evaluation of acute kidney injury and acute congestive heart failure. Her past medical history includes hypertension, hyperlipidemia, type 2 diabetes mellitus, a previous right basal ganglia stroke in 1995 with residual left-sided hemiplegia, osteoarthritis, cervicalgia, and anxiety.The patient lives in her own home but relies on her son for daily assistance. She has chronic left-sided hemiplegia from her previous stroke, with baseline strength of 2/5 in her left arm and 3/5 in her left leg. Despite this, she can walk short distances without a walker. She has recently been diagnosed with renal failure requiring hemodialysis  Neurology was consulted for clearance for CABG, bilateral carotid ultrasound that showed   Mild and calcific plaque in the right internal carotid artery without evidence of hemodynamically significant stenosis.    Mild and calcific plaque in the left internal carotid artery without evidence of hemodynamically significant stenosis.    Normal antegrade flow involving the right vertebral artery.    Normal antegrade flow involving the left vertebral artery.    MRI showed subacute infarct at the left parietal lobe, with old left occipital infarct, and several bilateral old basal ganglia lacunar infarct extended to bilateral corona radiator, with

## 2025-02-05 NOTE — PROGRESS NOTES
[facility]  Office: [phone]  Julian Banegas DO, Ronald Drake DO, Олег Weeks DO, José Sanchez DO, Demarco Delgado MD, Chaya Cyr MD, Tommy Madrigal MD, Ness Hernadez MD,  Waldo Jessica MD, Jose G Rice MD, Johanna Laura MD,  Minal Guerra DO, Mady Hurt MD, Fam Jiang MD, Amor Banegas DO, Mayi Moise MD,  Isaac Dickson DO, Iqra Hampton MD, Marianna Bradford MD, Munira Anderson MD, Steph Abernathy MD,  Alexis Powell MD, Heidi Boyd MD, Natan Alexis MD, Ishaan Gant MD, Rod Plasencia MD, Beverly Cooney MD, Hany Yañez DO, Dakota Johnson MD, Minal Boyle MD, Mohsin Reza, MD, Shirley Waterhouse, CNP,  Amanda Molina CNP, Hany Mcallister, CNP,  Dina Rader, DNP, Angelique Caruso, CNP, Sharon Chavarria, CNP, Geneva Vargas, CNP, Diane Clay, CNP, Natasha Vazquez, PA-C, Reva Aceves, CNP, Diego Kaplan, CNP,  Pili Ramos, CNP, Valarie Aguilar, CNP, Samantha Castillo, CNP,  Nikki Causey, CNS, Maye Jackman CNP, Gladys Whitlock CNP,   Wendy Rubio, CNP         [facility]   IN-PATIENT SERVICE   [facility]    Progress Note    2/5/2025    8:56 AM    Name:   Elham Amado  MRN:     [id number]     Acct:      [id number]   Room:   2027/2027-01  IP Day:  12  Admit Date:  1/24/2025  7:47 PM    PCP:   Thee Kay MD  Code Status:  DNR-CCA    Subjective:     C/C: No chief complaint on file.    Interval History Status: worsened.     Patient seen and examined. Mentation better. Sleeping well.     Brief History:     80-year-old female past medical history of diabetes type 2, hyperlipidemia, hypertension, CVA presented for shortness of breath concern for pneumonia patient was transferred to [facility] due to worsening renal function and needing dialysis.  Dialysis catheter was placed 1/27/2025.  And has been tired and tolerating dialysis.  Patient was eval by cardiology and cardiothoracic surgery underwent meeting with cath  conference and recommended for medical management at this time due to fluctuating mentation concerns for possible strokes and other comorbidities.  Neurology was also evaluated patient and was loaded on Plavix due to possible stroke alert. CT repeated. Discussed with family. DNR-CCA and plan to go home with hospice.     Review of Systems:     Review of Systems   Constitutional:  Positive for fatigue. Negative for activity change, appetite change, chills and fever.   HENT:  Negative for congestion, ear pain, facial swelling and mouth sores.    Eyes:  Negative for discharge and itching.   Respiratory:  Positive for shortness of breath. Negative for apnea and chest tightness.    Cardiovascular:  Negative for chest pain and leg swelling.   Gastrointestinal:  Negative for abdominal distention, abdominal pain, constipation and diarrhea.   Endocrine: Negative for cold intolerance, polyphagia and polyuria.   Genitourinary:  Negative for difficulty urinating, dysuria and flank pain.   Musculoskeletal:  Negative for arthralgias, back pain and joint swelling.   Skin:  Negative for color change and wound.   Neurological:  Positive for dizziness, speech difficulty and weakness. Negative for seizures, light-headedness and headaches.   Psychiatric/Behavioral:  Negative for agitation, behavioral problems and self-injury.        Medications:     Allergies:    Allergies   Allergen Reactions    Aricept [Donepezil Hcl] Diarrhea     Diarrhea    Namenda [Memantine Hcl] Other (See Comments)     Encephalopathy       Current Meds:   Scheduled Meds:    carvedilol  6.25 mg Oral BID WC    sodium bicarbonate  650 mg Oral 4x Daily    rosuvastatin  40 mg Oral Nightly    clopidogrel  75 mg Oral Daily    [Held by provider] amLODIPine  5 mg Oral BID    insulin glargine  10 Units SubCUTAneous BID    ipratropium  0.5 mg Nebulization BID    levalbuterol  0.63 mg Nebulization BID RT    sodium chloride flush  5-40 mL IntraVENous 2 times per day

## 2025-02-06 ENCOUNTER — TELEPHONE (OUTPATIENT)
Age: 80
End: 2025-02-06

## 2025-02-06 DIAGNOSIS — I50.43 ACUTE ON CHRONIC COMBINED SYSTOLIC AND DIASTOLIC CONGESTIVE HEART FAILURE (HCC): Primary | ICD-10-CM

## 2025-02-06 NOTE — TELEPHONE ENCOUNTER
Della calling to say that the patient was discharged into hospice care. She was discharged from [facility] yesterday to [id number]  diagnosis codes of heart failure and end stage renal disease. Patient is comfortable at home and has supplies. No major concerns and no changes to her meds were made. The patient's family would like Dr. OLEARY to be patient's PCP if he will follow for home health care. Patient also wants to change her DNR status to DNR comfort care but the people there can change that. The comfort nurse may reach out in the future to get more info on the patient.

## 2025-02-07 NOTE — TELEPHONE ENCOUNTER
Dina with [facility] calling to say patient is not doing well at all. She is full of fluid    Family is agreeable for comfort meds and albutral    Usually hospice will have their medical director prescribe meds or does Dr OLEARY want to prescribe?    Hoping for an answer quick - [phone]    Task printed

## 2025-02-08 PROBLEM — I67.2 INTRACRANIAL ATHEROSCLEROSIS: Status: ACTIVE | Noted: 2025-02-08

## 2025-05-23 NOTE — TELEPHONE ENCOUNTER
Jordyn with [id number] calling to let Dr OLEARY know that patient has passed away this morning    They will be signing the death certificate    Dr OLEARY - please forward to Pao RIZO when done so she can update chart

## (undated) DEVICE — ANGIOGRAPHIC CATHETER: Brand: EXPO™

## (undated) DEVICE — GUIDEWIRE 35/260/FC/PTFE/3J: Brand: GUIDEWIRE

## (undated) DEVICE — PINNACLE INTRODUCER SHEATH: Brand: PINNACLE

## (undated) DEVICE — KIT MICRO INTRO 4FR STIFF 40CM NIGHTENALL TUNGSTEN 7SMT

## (undated) DEVICE — CATHETER ANGIO 6FR L100CM DIA0.056IN FR4 CRV VASC ACCS EXPO

## (undated) DEVICE — TRAY SURG CUST CRD CATH TOLEDO

## (undated) DEVICE — INTRODUCER SHTH 6FR L11CM 0.038IN STD SIDEPRT EXTN 3 W